# Patient Record
Sex: FEMALE | Race: BLACK OR AFRICAN AMERICAN | NOT HISPANIC OR LATINO | Employment: OTHER | RURAL
[De-identification: names, ages, dates, MRNs, and addresses within clinical notes are randomized per-mention and may not be internally consistent; named-entity substitution may affect disease eponyms.]

---

## 2022-08-24 ENCOUNTER — HOSPITAL ENCOUNTER (INPATIENT)
Facility: HOSPITAL | Age: 67
LOS: 3 days | Discharge: NURSING, REHAB OR LONG-TERM CARE | DRG: 564 | End: 2022-08-27
Attending: EMERGENCY MEDICINE | Admitting: FAMILY MEDICINE
Payer: MEDICARE

## 2022-08-24 DIAGNOSIS — I48.91 ATRIAL FIBRILLATION: ICD-10-CM

## 2022-08-24 DIAGNOSIS — L89.300 PRESSURE INJURY OF BUTTOCK, UNSTAGEABLE, UNSPECIFIED LATERALITY: ICD-10-CM

## 2022-08-24 DIAGNOSIS — I10 HYPERTENSION, UNSPECIFIED TYPE: ICD-10-CM

## 2022-08-24 DIAGNOSIS — I48.91 ATRIAL FIBRILLATION, CONTROLLED: ICD-10-CM

## 2022-08-24 DIAGNOSIS — E11.621 DIABETIC ULCER OF RIGHT HEEL ASSOCIATED WITH TYPE 2 DIABETES MELLITUS, UNSPECIFIED ULCER STAGE: ICD-10-CM

## 2022-08-24 DIAGNOSIS — L97.125 NON-PRESSURE CHRONIC ULCER OF LEFT THIGH WITH MUSCLE INVOLVEMENT WITHOUT EVIDENCE OF NECROSIS: ICD-10-CM

## 2022-08-24 DIAGNOSIS — Z99.2 ESRD ON DIALYSIS: ICD-10-CM

## 2022-08-24 DIAGNOSIS — R53.1 GENERAL WEAKNESS: ICD-10-CM

## 2022-08-24 DIAGNOSIS — I82.4Y9 DEEP VEIN THROMBOSIS (DVT) OF PROXIMAL LOWER EXTREMITY, UNSPECIFIED CHRONICITY, UNSPECIFIED LATERALITY: ICD-10-CM

## 2022-08-24 DIAGNOSIS — T87.40 INFECTION OF AMPUTATION STUMP: ICD-10-CM

## 2022-08-24 DIAGNOSIS — L97.419 DIABETIC ULCER OF RIGHT HEEL ASSOCIATED WITH TYPE 2 DIABETES MELLITUS, UNSPECIFIED ULCER STAGE: ICD-10-CM

## 2022-08-24 DIAGNOSIS — E11.8 DIABETIC FOOT: ICD-10-CM

## 2022-08-24 DIAGNOSIS — A41.9 SEPSIS, DUE TO UNSPECIFIED ORGANISM, UNSPECIFIED WHETHER ACUTE ORGAN DYSFUNCTION PRESENT: Primary | ICD-10-CM

## 2022-08-24 DIAGNOSIS — E11.59 TYPE 2 DIABETES MELLITUS WITH OTHER CIRCULATORY COMPLICATION, UNSPECIFIED WHETHER LONG TERM INSULIN USE: ICD-10-CM

## 2022-08-24 DIAGNOSIS — N18.6 ESRD ON DIALYSIS: ICD-10-CM

## 2022-08-24 DIAGNOSIS — M79.606 LEG PAIN: ICD-10-CM

## 2022-08-24 LAB
ALBUMIN SERPL BCP-MCNC: 2.4 G/DL (ref 3.5–5)
ALBUMIN/GLOB SERPL: 0.7 {RATIO}
ALP SERPL-CCNC: 70 U/L (ref 55–142)
ALT SERPL W P-5'-P-CCNC: 11 U/L (ref 13–56)
ANION GAP SERPL CALCULATED.3IONS-SCNC: 17 MMOL/L (ref 7–16)
AST SERPL W P-5'-P-CCNC: 9 U/L (ref 15–37)
BASOPHILS # BLD AUTO: 0.04 K/UL (ref 0–0.2)
BASOPHILS NFR BLD AUTO: 0.3 % (ref 0–1)
BILIRUB SERPL-MCNC: 0.8 MG/DL (ref 0–1.2)
BUN SERPL-MCNC: 28 MG/DL (ref 7–18)
BUN/CREAT SERPL: 4 (ref 6–20)
CALCIUM SERPL-MCNC: 9.1 MG/DL (ref 8.5–10.1)
CHLORIDE SERPL-SCNC: 95 MMOL/L (ref 98–107)
CO2 SERPL-SCNC: 25 MMOL/L (ref 21–32)
CREAT SERPL-MCNC: 7.46 MG/DL (ref 0.55–1.02)
DIFFERENTIAL METHOD BLD: ABNORMAL
EGFR (NO RACE VARIABLE) (RUSH/TITUS): 6 ML/MIN/1.73M²
EOSINOPHIL # BLD AUTO: 0.03 K/UL (ref 0–0.5)
EOSINOPHIL NFR BLD AUTO: 0.2 % (ref 1–4)
ERYTHROCYTE [DISTWIDTH] IN BLOOD BY AUTOMATED COUNT: 18.3 % (ref 11.5–14.5)
GLOBULIN SER-MCNC: 3.6 G/DL (ref 2–4)
GLUCOSE SERPL-MCNC: 165 MG/DL (ref 74–106)
HCT VFR BLD AUTO: 28.4 % (ref 38–47)
HGB BLD-MCNC: 9.3 G/DL (ref 12–16)
IMM GRANULOCYTES # BLD AUTO: 0.11 K/UL (ref 0–0.04)
IMM GRANULOCYTES NFR BLD: 0.7 % (ref 0–0.4)
INR BLD: 1.86
LACTATE SERPL-SCNC: 1.8 MMOL/L (ref 0.4–2)
LYMPHOCYTES # BLD AUTO: 0.95 K/UL (ref 1–4.8)
LYMPHOCYTES NFR BLD AUTO: 6.3 % (ref 27–41)
MCH RBC QN AUTO: 31.2 PG (ref 27–31)
MCHC RBC AUTO-ENTMCNC: 32.7 G/DL (ref 32–36)
MCV RBC AUTO: 95.3 FL (ref 80–96)
MONOCYTES # BLD AUTO: 0.85 K/UL (ref 0–0.8)
MONOCYTES NFR BLD AUTO: 5.7 % (ref 2–6)
MPC BLD CALC-MCNC: 11.1 FL (ref 9.4–12.4)
NEUTROPHILS # BLD AUTO: 13.04 K/UL (ref 1.8–7.7)
NEUTROPHILS NFR BLD AUTO: 86.8 % (ref 53–65)
NRBC # BLD AUTO: 0 X10E3/UL
NRBC, AUTO (.00): 0 %
PLATELET # BLD AUTO: 212 K/UL (ref 150–400)
POTASSIUM SERPL-SCNC: 3.7 MMOL/L (ref 3.5–5.1)
PROT SERPL-MCNC: 6 G/DL (ref 6.4–8.2)
PROTHROMBIN TIME: 20.6 SECONDS (ref 11.7–14.7)
RBC # BLD AUTO: 2.98 M/UL (ref 4.2–5.4)
SODIUM SERPL-SCNC: 133 MMOL/L (ref 136–145)
WBC # BLD AUTO: 15.02 K/UL (ref 4.5–11)

## 2022-08-24 PROCEDURE — 87149 DNA/RNA DIRECT PROBE: CPT | Performed by: EMERGENCY MEDICINE

## 2022-08-24 PROCEDURE — 83605 ASSAY OF LACTIC ACID: CPT | Performed by: EMERGENCY MEDICINE

## 2022-08-24 PROCEDURE — 11000001 HC ACUTE MED/SURG PRIVATE ROOM

## 2022-08-24 PROCEDURE — 25000003 PHARM REV CODE 250: Performed by: EMERGENCY MEDICINE

## 2022-08-24 PROCEDURE — 99284 PR EMERGENCY DEPT VISIT,LEVEL IV: ICD-10-PCS | Mod: ,,, | Performed by: EMERGENCY MEDICINE

## 2022-08-24 PROCEDURE — 86140 C-REACTIVE PROTEIN: CPT | Performed by: FAMILY MEDICINE

## 2022-08-24 PROCEDURE — 96375 TX/PRO/DX INJ NEW DRUG ADDON: CPT

## 2022-08-24 PROCEDURE — 96367 TX/PROPH/DG ADDL SEQ IV INF: CPT

## 2022-08-24 PROCEDURE — 80053 COMPREHEN METABOLIC PANEL: CPT | Performed by: EMERGENCY MEDICINE

## 2022-08-24 PROCEDURE — 99223 PR INITIAL HOSPITAL CARE,LEVL III: ICD-10-PCS | Mod: ,,, | Performed by: HOSPITALIST

## 2022-08-24 PROCEDURE — 85651 RBC SED RATE NONAUTOMATED: CPT | Performed by: FAMILY MEDICINE

## 2022-08-24 PROCEDURE — 96372 THER/PROPH/DIAG INJ SC/IM: CPT

## 2022-08-24 PROCEDURE — 99223 1ST HOSP IP/OBS HIGH 75: CPT | Mod: ,,, | Performed by: HOSPITALIST

## 2022-08-24 PROCEDURE — 85025 COMPLETE CBC W/AUTO DIFF WBC: CPT | Performed by: EMERGENCY MEDICINE

## 2022-08-24 PROCEDURE — 63600175 PHARM REV CODE 636 W HCPCS: Performed by: EMERGENCY MEDICINE

## 2022-08-24 PROCEDURE — 96365 THER/PROPH/DIAG IV INF INIT: CPT

## 2022-08-24 PROCEDURE — 36415 COLL VENOUS BLD VENIPUNCTURE: CPT | Performed by: EMERGENCY MEDICINE

## 2022-08-24 PROCEDURE — S0073 INJECTION, AZTREONAM, 500 MG: HCPCS | Performed by: EMERGENCY MEDICINE

## 2022-08-24 PROCEDURE — 87040 BLOOD CULTURE FOR BACTERIA: CPT | Performed by: EMERGENCY MEDICINE

## 2022-08-24 PROCEDURE — 99284 EMERGENCY DEPT VISIT MOD MDM: CPT | Mod: ,,, | Performed by: EMERGENCY MEDICINE

## 2022-08-24 PROCEDURE — 85610 PROTHROMBIN TIME: CPT | Performed by: EMERGENCY MEDICINE

## 2022-08-24 PROCEDURE — 99285 EMERGENCY DEPT VISIT HI MDM: CPT | Mod: 25

## 2022-08-24 RX ORDER — TALC
6 POWDER (GRAM) TOPICAL NIGHTLY
COMMUNITY
Start: 2022-08-03

## 2022-08-24 RX ORDER — ONDANSETRON 2 MG/ML
8 INJECTION INTRAMUSCULAR; INTRAVENOUS EVERY 6 HOURS PRN
Status: DISCONTINUED | OUTPATIENT
Start: 2022-08-24 | End: 2022-08-27

## 2022-08-24 RX ORDER — AMLODIPINE BESYLATE 5 MG/1
5 TABLET ORAL DAILY
Status: DISCONTINUED | OUTPATIENT
Start: 2022-08-25 | End: 2022-08-25

## 2022-08-24 RX ORDER — ATORVASTATIN CALCIUM 40 MG/1
40 TABLET, FILM COATED ORAL NIGHTLY
COMMUNITY
Start: 2022-08-03

## 2022-08-24 RX ORDER — LEVOTHYROXINE SODIUM 100 UG/1
100 TABLET ORAL EVERY MORNING
COMMUNITY
Start: 2022-08-03

## 2022-08-24 RX ORDER — PANTOPRAZOLE SODIUM 40 MG/1
40 TABLET, DELAYED RELEASE ORAL DAILY
Status: DISCONTINUED | OUTPATIENT
Start: 2022-08-25 | End: 2022-08-27 | Stop reason: HOSPADM

## 2022-08-24 RX ORDER — ACETAMINOPHEN 500 MG
1000 TABLET ORAL EVERY 6 HOURS PRN
Status: DISCONTINUED | OUTPATIENT
Start: 2022-08-24 | End: 2022-08-27

## 2022-08-24 RX ORDER — PANTOPRAZOLE SODIUM 40 MG/1
40 TABLET, DELAYED RELEASE ORAL DAILY
COMMUNITY
Start: 2022-08-03

## 2022-08-24 RX ORDER — MEGESTROL ACETATE 40 MG/1
40 TABLET ORAL EVERY MORNING
Status: DISCONTINUED | OUTPATIENT
Start: 2022-08-25 | End: 2022-08-27 | Stop reason: HOSPADM

## 2022-08-24 RX ORDER — ESCITALOPRAM OXALATE 10 MG/1
10 TABLET ORAL NIGHTLY
COMMUNITY
Start: 2022-08-03

## 2022-08-24 RX ORDER — SIMETHICONE 80 MG
1 TABLET,CHEWABLE ORAL 3 TIMES DAILY PRN
Status: DISCONTINUED | OUTPATIENT
Start: 2022-08-24 | End: 2022-08-27 | Stop reason: HOSPADM

## 2022-08-24 RX ORDER — GABAPENTIN 100 MG/1
200 CAPSULE ORAL NIGHTLY
COMMUNITY
Start: 2022-08-03

## 2022-08-24 RX ORDER — GUAIFENESIN/DEXTROMETHORPHAN 100-10MG/5
10 SYRUP ORAL EVERY 6 HOURS PRN
Status: DISCONTINUED | OUTPATIENT
Start: 2022-08-24 | End: 2022-08-27 | Stop reason: HOSPADM

## 2022-08-24 RX ORDER — LEVOTHYROXINE SODIUM 100 UG/1
100 TABLET ORAL EVERY MORNING
Status: DISCONTINUED | OUTPATIENT
Start: 2022-08-25 | End: 2022-08-27 | Stop reason: HOSPADM

## 2022-08-24 RX ORDER — ALLOPURINOL 100 MG/1
100 TABLET ORAL DAILY
COMMUNITY
Start: 2022-08-03

## 2022-08-24 RX ORDER — ESCITALOPRAM OXALATE 10 MG/1
10 TABLET ORAL NIGHTLY
Status: DISCONTINUED | OUTPATIENT
Start: 2022-08-25 | End: 2022-08-27 | Stop reason: HOSPADM

## 2022-08-24 RX ORDER — HYDROCODONE BITARTRATE AND ACETAMINOPHEN 7.5; 325 MG/1; MG/1
1 TABLET ORAL EVERY 6 HOURS PRN
Status: DISCONTINUED | OUTPATIENT
Start: 2022-08-25 | End: 2022-08-26

## 2022-08-24 RX ORDER — HYDROCODONE BITARTRATE AND ACETAMINOPHEN 7.5; 325 MG/1; MG/1
1 TABLET ORAL EVERY 6 HOURS PRN
COMMUNITY
Start: 2022-08-15

## 2022-08-24 RX ORDER — ONDANSETRON 2 MG/ML
4 INJECTION INTRAMUSCULAR; INTRAVENOUS ONCE
Status: COMPLETED | OUTPATIENT
Start: 2022-08-24 | End: 2022-08-24

## 2022-08-24 RX ORDER — TRAZODONE HYDROCHLORIDE 50 MG/1
50 TABLET ORAL NIGHTLY PRN
Status: DISCONTINUED | OUTPATIENT
Start: 2022-08-24 | End: 2022-08-27 | Stop reason: HOSPADM

## 2022-08-24 RX ORDER — GABAPENTIN 100 MG/1
200 CAPSULE ORAL NIGHTLY
Status: DISCONTINUED | OUTPATIENT
Start: 2022-08-25 | End: 2022-08-27 | Stop reason: HOSPADM

## 2022-08-24 RX ORDER — AMLODIPINE BESYLATE 5 MG/1
1 TABLET ORAL DAILY
Status: ON HOLD | COMMUNITY
End: 2022-09-02 | Stop reason: CLARIF

## 2022-08-24 RX ORDER — MORPHINE SULFATE 2 MG/ML
2 INJECTION, SOLUTION INTRAMUSCULAR; INTRAVENOUS
Status: COMPLETED | OUTPATIENT
Start: 2022-08-24 | End: 2022-08-24

## 2022-08-24 RX ORDER — WARFARIN 4 MG/1
4 TABLET ORAL
Status: ON HOLD | COMMUNITY
Start: 2022-08-02 | End: 2022-08-30 | Stop reason: DRUGHIGH

## 2022-08-24 RX ORDER — WARFARIN 3 MG/1
3 TABLET ORAL DAILY
Status: ON HOLD | COMMUNITY
Start: 2022-08-02 | End: 2022-08-30 | Stop reason: DRUGHIGH

## 2022-08-24 RX ORDER — TALC
3 POWDER (GRAM) TOPICAL NIGHTLY
Status: DISCONTINUED | OUTPATIENT
Start: 2022-08-25 | End: 2022-08-27 | Stop reason: HOSPADM

## 2022-08-24 RX ORDER — MEGESTROL ACETATE 40 MG/1
40 TABLET ORAL EVERY MORNING
Status: ON HOLD | COMMUNITY
Start: 2022-08-03 | End: 2022-09-14 | Stop reason: HOSPADM

## 2022-08-24 RX ORDER — RESVER/WINE/BFL/GRPSD/PC/C/POM 200MG-60MG
1 CAPSULE ORAL DAILY
COMMUNITY
Start: 2022-08-03

## 2022-08-24 RX ORDER — BISACODYL 5 MG
10 TABLET, DELAYED RELEASE (ENTERIC COATED) ORAL DAILY PRN
Status: DISCONTINUED | OUTPATIENT
Start: 2022-08-24 | End: 2022-08-27 | Stop reason: HOSPADM

## 2022-08-24 RX ORDER — ACETAMINOPHEN 500 MG
1 TABLET ORAL DAILY
Status: DISCONTINUED | OUTPATIENT
Start: 2022-08-25 | End: 2022-08-27 | Stop reason: HOSPADM

## 2022-08-24 RX ORDER — ATORVASTATIN CALCIUM 40 MG/1
40 TABLET, FILM COATED ORAL NIGHTLY
Status: DISCONTINUED | OUTPATIENT
Start: 2022-08-25 | End: 2022-08-27 | Stop reason: HOSPADM

## 2022-08-24 RX ADMIN — SODIUM CHLORIDE, POTASSIUM CHLORIDE, SODIUM LACTATE AND CALCIUM CHLORIDE 250 ML: 600; 310; 30; 20 INJECTION, SOLUTION INTRAVENOUS at 10:08

## 2022-08-24 RX ADMIN — VANCOMYCIN HYDROCHLORIDE 1500 MG: 1 INJECTION, POWDER, LYOPHILIZED, FOR SOLUTION INTRAVENOUS at 10:08

## 2022-08-24 RX ADMIN — ONDANSETRON 4 MG: 2 INJECTION INTRAMUSCULAR; INTRAVENOUS at 09:08

## 2022-08-24 RX ADMIN — MORPHINE SULFATE 2 MG: 2 INJECTION, SOLUTION INTRAMUSCULAR; INTRAVENOUS at 09:08

## 2022-08-24 RX ADMIN — AZTREONAM 1000 MG: 1 INJECTION, POWDER, LYOPHILIZED, FOR SOLUTION INTRAMUSCULAR; INTRAVENOUS at 09:08

## 2022-08-25 PROBLEM — I10 HTN (HYPERTENSION): Status: ACTIVE | Noted: 2022-08-25

## 2022-08-25 PROBLEM — Z86.718 HISTORY OF DVT (DEEP VEIN THROMBOSIS): Status: ACTIVE | Noted: 2022-08-25

## 2022-08-25 PROBLEM — E11.621 DIABETIC ULCER OF RIGHT HEEL ASSOCIATED WITH TYPE 2 DIABETES MELLITUS: Status: ACTIVE | Noted: 2022-08-25

## 2022-08-25 PROBLEM — I48.91 ATRIAL FIBRILLATION, CONTROLLED: Status: ACTIVE | Noted: 2022-08-25

## 2022-08-25 PROBLEM — D72.829 LEUKOCYTOSIS: Status: RESOLVED | Noted: 2022-08-25 | Resolved: 2022-08-25

## 2022-08-25 PROBLEM — N18.6 ESRD ON DIALYSIS: Status: ACTIVE | Noted: 2022-08-25

## 2022-08-25 PROBLEM — I82.409 DEEP VEIN THROMBOSIS (DVT) OF LOWER EXTREMITY: Status: ACTIVE | Noted: 2022-08-25

## 2022-08-25 PROBLEM — L97.125: Status: ACTIVE | Noted: 2022-08-25

## 2022-08-25 PROBLEM — D72.829 LEUKOCYTOSIS: Status: ACTIVE | Noted: 2022-08-25

## 2022-08-25 PROBLEM — L97.419 DIABETIC ULCER OF RIGHT HEEL ASSOCIATED WITH TYPE 2 DIABETES MELLITUS: Status: ACTIVE | Noted: 2022-08-25

## 2022-08-25 PROBLEM — T87.40 INFECTION OF AMPUTATION STUMP: Status: ACTIVE | Noted: 2022-08-25

## 2022-08-25 PROBLEM — Z99.2 ESRD ON DIALYSIS: Status: ACTIVE | Noted: 2022-08-25

## 2022-08-25 PROBLEM — E11.9 TYPE 2 DIABETES MELLITUS: Status: ACTIVE | Noted: 2022-08-25

## 2022-08-25 PROBLEM — L89.300 PRESSURE INJURY OF BUTTOCK, UNSTAGEABLE: Status: ACTIVE | Noted: 2022-08-25

## 2022-08-25 LAB
ANION GAP SERPL CALCULATED.3IONS-SCNC: 16 MMOL/L (ref 7–16)
APTT PPP: 43.2 SECONDS (ref 25.2–37.3)
BASOPHILS # BLD AUTO: 0.03 K/UL (ref 0–0.2)
BASOPHILS NFR BLD AUTO: 0.2 % (ref 0–1)
BUN SERPL-MCNC: 30 MG/DL (ref 7–18)
BUN/CREAT SERPL: 4 (ref 6–20)
CALCIUM SERPL-MCNC: 8.4 MG/DL (ref 8.5–10.1)
CHLORIDE SERPL-SCNC: 98 MMOL/L (ref 98–107)
CO2 SERPL-SCNC: 25 MMOL/L (ref 21–32)
CREAT SERPL-MCNC: 7.87 MG/DL (ref 0.55–1.02)
CRP SERPL-MCNC: 18.4 MG/DL (ref 0–0.8)
DIFFERENTIAL METHOD BLD: ABNORMAL
EGFR (NO RACE VARIABLE) (RUSH/TITUS): 5 ML/MIN/1.73M²
EOSINOPHIL # BLD AUTO: 0.03 K/UL (ref 0–0.5)
EOSINOPHIL NFR BLD AUTO: 0.2 % (ref 1–4)
ERYTHROCYTE [DISTWIDTH] IN BLOOD BY AUTOMATED COUNT: 18.6 % (ref 11.5–14.5)
ERYTHROCYTE [SEDIMENTATION RATE] IN BLOOD BY WESTERGREN METHOD: 69 MM/HR (ref 0–30)
EST. AVERAGE GLUCOSE BLD GHB EST-MCNC: 90 MG/DL
GLUCOSE SERPL-MCNC: 140 MG/DL (ref 70–105)
GLUCOSE SERPL-MCNC: 147 MG/DL (ref 70–105)
GLUCOSE SERPL-MCNC: 157 MG/DL (ref 74–106)
GLUCOSE SERPL-MCNC: 166 MG/DL (ref 70–105)
GLUCOSE SERPL-MCNC: 188 MG/DL (ref 70–105)
GLUCOSE SERPL-MCNC: 97 MG/DL (ref 70–105)
HBA1C MFR BLD HPLC: 5.3 % (ref 4.5–6.6)
HCT VFR BLD AUTO: 23.6 % (ref 38–47)
HGB BLD-MCNC: 7.3 G/DL (ref 12–16)
IMM GRANULOCYTES # BLD AUTO: 0.16 K/UL (ref 0–0.04)
IMM GRANULOCYTES NFR BLD: 1.1 % (ref 0–0.4)
LYMPHOCYTES # BLD AUTO: 0.81 K/UL (ref 1–4.8)
LYMPHOCYTES NFR BLD AUTO: 5.5 % (ref 27–41)
MCH RBC QN AUTO: 30.8 PG (ref 27–31)
MCHC RBC AUTO-ENTMCNC: 30.9 G/DL (ref 32–36)
MCV RBC AUTO: 99.6 FL (ref 80–96)
MONOCYTES # BLD AUTO: 0.85 K/UL (ref 0–0.8)
MONOCYTES NFR BLD AUTO: 5.7 % (ref 2–6)
MPC BLD CALC-MCNC: 12 FL (ref 9.4–12.4)
NEUTROPHILS # BLD AUTO: 12.96 K/UL (ref 1.8–7.7)
NEUTROPHILS NFR BLD AUTO: 87.3 % (ref 53–65)
NRBC # BLD AUTO: 0 X10E3/UL
NRBC, AUTO (.00): 0 %
PLATELET # BLD AUTO: 203 K/UL (ref 150–400)
POTASSIUM SERPL-SCNC: 4.5 MMOL/L (ref 3.5–5.1)
RBC # BLD AUTO: 2.37 M/UL (ref 4.2–5.4)
SARS-COV-2 RDRP RESP QL NAA+PROBE: NEGATIVE
SODIUM SERPL-SCNC: 134 MMOL/L (ref 136–145)
VANCOMYCIN SERPL-MCNC: 9.6 ΜG/ML (ref 0–20)
WBC # BLD AUTO: 14.84 K/UL (ref 4.5–11)

## 2022-08-25 PROCEDURE — 63600175 PHARM REV CODE 636 W HCPCS: Performed by: NURSE PRACTITIONER

## 2022-08-25 PROCEDURE — 25000003 PHARM REV CODE 250: Performed by: FAMILY MEDICINE

## 2022-08-25 PROCEDURE — 63600175 PHARM REV CODE 636 W HCPCS: Performed by: FAMILY MEDICINE

## 2022-08-25 PROCEDURE — 25000003 PHARM REV CODE 250: Performed by: INTERNAL MEDICINE

## 2022-08-25 PROCEDURE — 99232 SBSQ HOSP IP/OBS MODERATE 35: CPT | Mod: ,,, | Performed by: FAMILY MEDICINE

## 2022-08-25 PROCEDURE — 20000000 HC ICU ROOM

## 2022-08-25 PROCEDURE — 87070 CULTURE OTHR SPECIMN AEROBIC: CPT | Performed by: FAMILY MEDICINE

## 2022-08-25 PROCEDURE — 85025 COMPLETE CBC W/AUTO DIFF WBC: CPT | Performed by: FAMILY MEDICINE

## 2022-08-25 PROCEDURE — 99232 PR SUBSEQUENT HOSPITAL CARE,LEVL II: ICD-10-PCS | Mod: ,,, | Performed by: FAMILY MEDICINE

## 2022-08-25 PROCEDURE — 85730 THROMBOPLASTIN TIME PARTIAL: CPT | Performed by: FAMILY MEDICINE

## 2022-08-25 PROCEDURE — S0030 INJECTION, METRONIDAZOLE: HCPCS | Performed by: FAMILY MEDICINE

## 2022-08-25 PROCEDURE — 99232 SBSQ HOSP IP/OBS MODERATE 35: CPT | Mod: ,,, | Performed by: NURSE PRACTITIONER

## 2022-08-25 PROCEDURE — 80202 ASSAY OF VANCOMYCIN: CPT | Performed by: FAMILY MEDICINE

## 2022-08-25 PROCEDURE — 99232 PR SUBSEQUENT HOSPITAL CARE,LEVL II: ICD-10-PCS | Mod: ,,, | Performed by: NURSE PRACTITIONER

## 2022-08-25 PROCEDURE — 82962 GLUCOSE BLOOD TEST: CPT

## 2022-08-25 PROCEDURE — 83036 HEMOGLOBIN GLYCOSYLATED A1C: CPT | Performed by: FAMILY MEDICINE

## 2022-08-25 PROCEDURE — 80048 BASIC METABOLIC PNL TOTAL CA: CPT | Performed by: FAMILY MEDICINE

## 2022-08-25 PROCEDURE — S5010 5% DEXTROSE AND 0.45% SALINE: HCPCS | Performed by: FAMILY MEDICINE

## 2022-08-25 PROCEDURE — 63600175 PHARM REV CODE 636 W HCPCS: Performed by: INTERNAL MEDICINE

## 2022-08-25 PROCEDURE — 25000003 PHARM REV CODE 250: Performed by: NURSE PRACTITIONER

## 2022-08-25 PROCEDURE — 94761 N-INVAS EAR/PLS OXIMETRY MLT: CPT

## 2022-08-25 PROCEDURE — 90935 HEMODIALYSIS ONE EVALUATION: CPT

## 2022-08-25 PROCEDURE — 27000221 HC OXYGEN, UP TO 24 HOURS

## 2022-08-25 PROCEDURE — 87635 SARS-COV-2 COVID-19 AMP PRB: CPT | Performed by: FAMILY MEDICINE

## 2022-08-25 PROCEDURE — 87075 CULTR BACTERIA EXCEPT BLOOD: CPT | Performed by: FAMILY MEDICINE

## 2022-08-25 PROCEDURE — 36415 COLL VENOUS BLD VENIPUNCTURE: CPT | Performed by: FAMILY MEDICINE

## 2022-08-25 PROCEDURE — S0073 INJECTION, AZTREONAM, 500 MG: HCPCS | Performed by: FAMILY MEDICINE

## 2022-08-25 RX ORDER — GLUCAGON 1 MG
1 KIT INJECTION
Status: DISCONTINUED | OUTPATIENT
Start: 2022-08-25 | End: 2022-08-27 | Stop reason: HOSPADM

## 2022-08-25 RX ORDER — MUPIROCIN 20 MG/G
OINTMENT TOPICAL 2 TIMES DAILY
Status: DISCONTINUED | OUTPATIENT
Start: 2022-08-25 | End: 2022-08-27 | Stop reason: HOSPADM

## 2022-08-25 RX ORDER — INSULIN ASPART 100 [IU]/ML
0-5 INJECTION, SOLUTION INTRAVENOUS; SUBCUTANEOUS
Status: DISCONTINUED | OUTPATIENT
Start: 2022-08-25 | End: 2022-08-27 | Stop reason: HOSPADM

## 2022-08-25 RX ORDER — SODIUM CHLORIDE 9 MG/ML
INJECTION, SOLUTION INTRAVENOUS
Status: DISCONTINUED | OUTPATIENT
Start: 2022-08-25 | End: 2022-08-27 | Stop reason: HOSPADM

## 2022-08-25 RX ORDER — METRONIDAZOLE 500 MG/100ML
500 INJECTION, SOLUTION INTRAVENOUS
Status: DISCONTINUED | OUTPATIENT
Start: 2022-08-25 | End: 2022-08-26

## 2022-08-25 RX ORDER — MORPHINE SULFATE 2 MG/ML
2 INJECTION, SOLUTION INTRAMUSCULAR; INTRAVENOUS ONCE
Status: COMPLETED | OUTPATIENT
Start: 2022-08-25 | End: 2022-08-25

## 2022-08-25 RX ORDER — FOLIC ACID 1 MG/1
1000 TABLET ORAL EVERY MORNING
COMMUNITY
Start: 2022-08-03

## 2022-08-25 RX ORDER — SODIUM CHLORIDE 0.9 % (FLUSH) 0.9 %
10 SYRINGE (ML) INJECTION EVERY 12 HOURS PRN
Status: DISCONTINUED | OUTPATIENT
Start: 2022-08-25 | End: 2022-08-27 | Stop reason: HOSPADM

## 2022-08-25 RX ORDER — LINEZOLID 2 MG/ML
600 INJECTION, SOLUTION INTRAVENOUS
Status: DISCONTINUED | OUTPATIENT
Start: 2022-08-25 | End: 2022-08-25

## 2022-08-25 RX ORDER — WARFARIN SODIUM 5 MG/1
5 TABLET ORAL DAILY
Status: ON HOLD | COMMUNITY
Start: 2022-08-23 | End: 2022-09-14 | Stop reason: HOSPADM

## 2022-08-25 RX ORDER — INSULIN ASPART 100 [IU]/ML
3 INJECTION, SOLUTION INTRAVENOUS; SUBCUTANEOUS
Status: DISCONTINUED | OUTPATIENT
Start: 2022-08-25 | End: 2022-08-27 | Stop reason: HOSPADM

## 2022-08-25 RX ORDER — ASPIRIN 81 MG/1
81 TABLET ORAL DAILY
Status: DISCONTINUED | OUTPATIENT
Start: 2022-08-25 | End: 2022-08-27 | Stop reason: HOSPADM

## 2022-08-25 RX ORDER — ENOXAPARIN SODIUM 150 MG/ML
120 INJECTION SUBCUTANEOUS DAILY
Status: ON HOLD | COMMUNITY
Start: 2022-08-23 | End: 2022-08-30 | Stop reason: ALTCHOICE

## 2022-08-25 RX ORDER — ALLOPURINOL 100 MG/1
100 TABLET ORAL DAILY
Status: DISCONTINUED | OUTPATIENT
Start: 2022-08-25 | End: 2022-08-27 | Stop reason: HOSPADM

## 2022-08-25 RX ORDER — MORPHINE SULFATE 2 MG/ML
2 INJECTION, SOLUTION INTRAMUSCULAR; INTRAVENOUS ONCE
Status: DISCONTINUED | OUTPATIENT
Start: 2022-08-25 | End: 2022-08-25

## 2022-08-25 RX ORDER — HEPARIN SODIUM 5000 [USP'U]/ML
5000 INJECTION, SOLUTION INTRAVENOUS; SUBCUTANEOUS EVERY 8 HOURS
Status: DISCONTINUED | OUTPATIENT
Start: 2022-08-25 | End: 2022-08-25

## 2022-08-25 RX ORDER — HUMAN INSULIN 100 [USP'U]/ML
25 INJECTION, SUSPENSION SUBCUTANEOUS 2 TIMES DAILY
Status: ON HOLD | COMMUNITY
Start: 2022-08-04 | End: 2022-09-14 | Stop reason: HOSPADM

## 2022-08-25 RX ORDER — ASPIRIN 81 MG/1
81 TABLET ORAL DAILY
COMMUNITY

## 2022-08-25 RX ORDER — FOLIC ACID 1 MG/1
1000 TABLET ORAL EVERY MORNING
Status: DISCONTINUED | OUTPATIENT
Start: 2022-08-25 | End: 2022-08-27 | Stop reason: HOSPADM

## 2022-08-25 RX ORDER — HEPARIN SODIUM 1000 [USP'U]/ML
4000 INJECTION, SOLUTION INTRAVENOUS; SUBCUTANEOUS
Status: DISCONTINUED | OUTPATIENT
Start: 2022-08-25 | End: 2022-08-27 | Stop reason: HOSPADM

## 2022-08-25 RX ORDER — DEXTROSE MONOHYDRATE AND SODIUM CHLORIDE 5; .45 G/100ML; G/100ML
INJECTION, SOLUTION INTRAVENOUS CONTINUOUS
Status: DISCONTINUED | OUTPATIENT
Start: 2022-08-25 | End: 2022-08-25

## 2022-08-25 RX ADMIN — ATORVASTATIN CALCIUM 40 MG: 40 TABLET, FILM COATED ORAL at 01:08

## 2022-08-25 RX ADMIN — ALLOPURINOL 100 MG: 100 TABLET ORAL at 09:08

## 2022-08-25 RX ADMIN — HYDROCODONE BITARTRATE AND ACETAMINOPHEN 1 TABLET: 7.5; 325 TABLET ORAL at 10:08

## 2022-08-25 RX ADMIN — METRONIDAZOLE 500 MG: 500 INJECTION, SOLUTION INTRAVENOUS at 04:08

## 2022-08-25 RX ADMIN — FOLIC ACID 1000 MCG: 1 TABLET ORAL at 06:08

## 2022-08-25 RX ADMIN — GABAPENTIN 200 MG: 100 CAPSULE ORAL at 09:08

## 2022-08-25 RX ADMIN — HEPARIN SODIUM 4000 UNITS: 1000 INJECTION, SOLUTION INTRAVENOUS; SUBCUTANEOUS at 01:08

## 2022-08-25 RX ADMIN — ESCITALOPRAM OXALATE 10 MG: 10 TABLET ORAL at 01:08

## 2022-08-25 RX ADMIN — Medication 5000 UNITS: at 08:08

## 2022-08-25 RX ADMIN — MUPIROCIN: 20 OINTMENT TOPICAL at 09:08

## 2022-08-25 RX ADMIN — HYDROCODONE BITARTRATE AND ACETAMINOPHEN 1 TABLET: 7.5; 325 TABLET ORAL at 02:08

## 2022-08-25 RX ADMIN — SODIUM CHLORIDE: 9 INJECTION, SOLUTION INTRAVENOUS at 11:08

## 2022-08-25 RX ADMIN — MORPHINE SULFATE 2 MG: 2 INJECTION, SOLUTION INTRAMUSCULAR; INTRAVENOUS at 05:08

## 2022-08-25 RX ADMIN — ESCITALOPRAM OXALATE 10 MG: 10 TABLET ORAL at 09:08

## 2022-08-25 RX ADMIN — AZTREONAM 1000 MG: 1 INJECTION, POWDER, LYOPHILIZED, FOR SOLUTION INTRAMUSCULAR; INTRAVENOUS at 09:08

## 2022-08-25 RX ADMIN — DEXTROSE AND SODIUM CHLORIDE: 5; 450 INJECTION, SOLUTION INTRAVENOUS at 05:08

## 2022-08-25 RX ADMIN — INSULIN DETEMIR 10 UNITS: 100 INJECTION, SOLUTION SUBCUTANEOUS at 02:08

## 2022-08-25 RX ADMIN — ATORVASTATIN CALCIUM 40 MG: 40 TABLET, FILM COATED ORAL at 09:08

## 2022-08-25 RX ADMIN — MELATONIN 3 MG: at 09:08

## 2022-08-25 RX ADMIN — PANTOPRAZOLE SODIUM 40 MG: 40 TABLET, DELAYED RELEASE ORAL at 08:08

## 2022-08-25 RX ADMIN — INSULIN DETEMIR 10 UNITS: 100 INJECTION, SOLUTION SUBCUTANEOUS at 09:08

## 2022-08-25 RX ADMIN — HYDROCODONE BITARTRATE AND ACETAMINOPHEN 1 TABLET: 7.5; 325 TABLET ORAL at 01:08

## 2022-08-25 RX ADMIN — ASPIRIN 81 MG: 81 TABLET, COATED ORAL at 08:08

## 2022-08-25 RX ADMIN — MELATONIN 3 MG: at 01:08

## 2022-08-25 RX ADMIN — MEGESTROL ACETATE 40 MG: 40 TABLET ORAL at 06:08

## 2022-08-25 RX ADMIN — LEVOTHYROXINE SODIUM 100 MCG: 100 TABLET ORAL at 06:08

## 2022-08-25 RX ADMIN — NOREPINEPHRINE BITARTRATE 0.02 MCG/KG/MIN: 1 INJECTION, SOLUTION, CONCENTRATE INTRAVENOUS at 06:08

## 2022-08-25 RX ADMIN — GABAPENTIN 200 MG: 100 CAPSULE ORAL at 01:08

## 2022-08-25 RX ADMIN — METRONIDAZOLE 500 MG: 500 INJECTION, SOLUTION INTRAVENOUS at 09:08

## 2022-08-25 RX ADMIN — HYDROCODONE BITARTRATE AND ACETAMINOPHEN 1 TABLET: 7.5; 325 TABLET ORAL at 09:08

## 2022-08-25 NOTE — PROGRESS NOTES
1620pm... Here at bedside. Patient noted to be hypotensive 76/40 post dialysis. Unable to push IV bolus in this dialysis patient. Spoke with Eli Valdes NP in ICU. She accepts patient for transfer and closer monitoring and intervention.

## 2022-08-25 NOTE — ASSESSMENT & PLAN NOTE
Sister states patient has been taking coumadin for long time 3mg to 4mg then when she went to [facility] about 2 weeks ago, pt was diagnosed with DVT and coumadin dosage was increased to 5mg. Sister states pt started taking coumadin 5 mg 2 days ago(8/23)  after she picked up the medicine. INR is 1.86.  she was also prescribed lovenox 120mg daily for DVT  and given lovenox injection daily (8/23, 8/24). Will hold on anticoagulants at this time for possible procedure     -Rate controlled  -tele

## 2022-08-25 NOTE — ASSESSMENT & PLAN NOTE
-HgA1C pending   -ESR, CRP pending   -Wound care consult.   -Blood cultures x 2 pending   -US arterial Right LE: poorly visualized due to body habitus  -US venous Right LE: No DVT   -SS consult for possible NH placement

## 2022-08-25 NOTE — ASSESSMENT & PLAN NOTE
No abscess or drainage present.     -wound care consult.   -ESR, CRP pending  -Blood cultures x 2 pending.

## 2022-08-25 NOTE — ASSESSMENT & PLAN NOTE
Pt takes 70/30 novolin 25 units QAM and 25 units QHS.with sliding scale at home.     HgA1C pending  -will initiate aspart 3 TIDWM with 10 units basal insulin for now   -SSI   -POCT glucose x 4 daily

## 2022-08-25 NOTE — HOSPITAL COURSE
08/25 Patient seen in ER. Lab unable to obtain specimen. Poor access. Will order midline. Nephrology consult for dialysis regimen (TTS). Wound consult.    acute perforated diverticulitis -for repeat ct abdomen on wednesday  fevers - improving  leukocytosis - resolved  plan - increase vanco to 1.5gms iv q12  cont azactam 1 gm iv q8  cont flagyl 500mgs iv q8

## 2022-08-25 NOTE — ASSESSMENT & PLAN NOTE
Clean with vashe  Apply vashe moistened drawtex to wound bed  Cover with secure wit 4x4s, abd pad, and paper tape  Change daily and PRN for drainage  Cultures pending  IV antibiotics

## 2022-08-25 NOTE — NURSING
Received patient from dialysis approximately 1500. Patient on 2L NC, c/o sever pain in right leg. Took over an hour to register a O2 sat trying, ear, finger, forehead. Pulses not palpable. Patient awake and alert talking coherently. Blood pressure very low, unable to hear when taking manually. Taken multiple times manually and automatically in various extremities with the most accurate being 76/40 manually. Dr. Espinosa called twice but no answer. Secure chatted about patient status. Will continue to monitor

## 2022-08-25 NOTE — ASSESSMENT & PLAN NOTE
HgA1C pending  -Pt not on any diabetic meds. Will need to confirm with family and pharmacy AM   -will initiate aspart 3 TIDWM with basal insulin for now   -SSI   -POCT glucose x 4 daily

## 2022-08-25 NOTE — ED PROVIDER NOTES
Encounter Date: 8/24/2022       History     Chief Complaint   Patient presents with    Foot Pain       Pt is a 66 y.o. female presenting with RLE pain. She is accompanied by her daughter at the time of my exam. Pt reports that she was seen at [facility] last week and found to have a DVT. She was started on lovenox with a bridge to coumadin but neither family nor Pt filled the coumadin rx. Pt denies fever, chills or any other sx. Pt reports that she was evicted from her apartment today.     Pt has a hx of ESRD on TTS Tu, Th, Sat, DM2, HTN and  L AKA          Review of patient's allergies indicates:   Allergen Reactions    Milk containing products     Pcn [penicillins]      Past Medical History:   Diagnosis Date    Gout, unspecified     Heart attack     HTN (hypertension)     Kidney failure     Neuropathy      Past Surgical History:   Procedure Laterality Date    CHOLECYSTECTOMY      HYSTERECTOMY       History reviewed. No pertinent family history.  Social History     Tobacco Use    Smoking status: Never Smoker    Smokeless tobacco: Never Used   Substance Use Topics    Alcohol use: Never    Drug use: Never     Review of Systems   Constitutional: Negative for fever.   HENT: Negative for sore throat.    Respiratory: Negative for shortness of breath.    Cardiovascular: Negative for chest pain.   Gastrointestinal: Negative for nausea.   Genitourinary: Negative for dysuria.   Musculoskeletal: Positive for myalgias. Negative for back pain.   Skin: Negative for rash.   Neurological: Negative for weakness.   Hematological: Does not bruise/bleed easily.       Physical Exam     Initial Vitals [08/24/22 1730]   BP Pulse Resp Temp SpO2   (!) 165/70 95 (!) 22 99.4 °F (37.4 °C) 98 %      MAP       --         Physical Exam    Constitutional: She appears well-developed and well-nourished. She is not diaphoretic. No distress.   HENT:   Head: Normocephalic and atraumatic.   Right Ear: External ear normal.   Left Ear: External  ear normal.   Mouth/Throat: Oropharynx is clear and moist. No oropharyngeal exudate.   Eyes: EOM are normal. Pupils are equal, round, and reactive to light. Right eye exhibits no discharge. Left eye exhibits no discharge. No scleral icterus.   Neck:   Normal range of motion.  Cardiovascular: Normal rate, regular rhythm, normal heart sounds and intact distal pulses. Exam reveals no gallop and no friction rub.    No murmur heard.  Pulmonary/Chest: Breath sounds normal. No respiratory distress. She has no wheezes. She has no rhonchi. She has no rales. She exhibits no tenderness.   Abdominal: Abdomen is soft. She exhibits no distension. There is no abdominal tenderness. There is no rebound and no guarding.   Musculoskeletal:         General: No tenderness or edema. Normal range of motion.      Cervical back: Normal range of motion.      Comments: L AKA with wound vac in place.      Neurological: She is alert and oriented to person, place, and time. GCS score is 15. GCS eye subscore is 4. GCS verbal subscore is 5. GCS motor subscore is 6.   Skin: Skin is warm and dry. Lesion noted.   Approx. 4 cm eschar noted to the R heel.                            Medical Screening Exam   See Full Note    ED Course   Procedures  Labs Reviewed   COMPREHENSIVE METABOLIC PANEL - Abnormal; Notable for the following components:       Result Value    Sodium 133 (*)     Chloride 95 (*)     Anion Gap 17 (*)     Glucose 165 (*)     BUN 28 (*)     Creatinine 7.46 (*)     BUN/Creatinine Ratio 4 (*)     Total Protein 6.0 (*)     Albumin 2.4 (*)     ALT 11 (*)     AST 9 (*)     eGFR 6 (*)     All other components within normal limits   PROTIME-INR - Abnormal; Notable for the following components:    PT 20.6 (*)     All other components within normal limits   CBC WITH DIFFERENTIAL - Abnormal; Notable for the following components:    WBC 15.02 (*)     RBC 2.98 (*)     Hemoglobin 9.3 (*)     Hematocrit 28.4 (*)     MCH 31.2 (*)     RDW 18.3 (*)      Neutrophils % 86.8 (*)     Lymphocytes % 6.3 (*)     Eosinophils % 0.2 (*)     Immature Granulocytes % 0.7 (*)     Neutrophils, Abs 13.04 (*)     Lymphocytes, Absolute 0.95 (*)     Monocytes, Absolute 0.85 (*)     Immature Granulocytes, Absolute 0.11 (*)     All other components within normal limits   LACTIC ACID, PLASMA - Normal   CULTURE, BLOOD   CULTURE, BLOOD   CBC W/ AUTO DIFFERENTIAL    Narrative:     The following orders were created for panel order CBC auto differential.  Procedure                               Abnormality         Status                     ---------                               -----------         ------                     CBC with Differential[[id number]]        Abnormal            Final result                 Please view results for these tests on the individual orders.   EXTRA TUBES    Narrative:     The following orders were created for panel order EXTRA TUBES.  Procedure                               Abnormality         Status                     ---------                               -----------         ------                     Light Green Top Hold[[id number]]                             In process                   Please view results for these tests on the individual orders.   LIGHT GREEN TOP HOLD   SEDIMENTATION RATE, AUTOMATED   C-REACTIVE PROTEIN          Imaging Results          X-Ray Chest 1 View (In process)                US ARTERIAL DOPPLER EXAM (In process)                US Lower Extremity Veins Right (In process)                  Medications   vancomycin (VANCOCIN) 1,500 mg in dextrose 5 % 250 mL IVPB (1,500 mg Intravenous New Bag 8/24/22 2227)   acetaminophen tablet 1,000 mg (has no administration in time range)   dextromethorphan-guaiFENesin  mg/5 ml liquid 10 mL (has no administration in time range)   bisacodyL EC tablet 10 mg (has no administration in time range)   ondansetron injection 8 mg (has no administration in time range)   simethicone chewable tablet  80 mg (has no administration in time range)   traZODone tablet 50 mg (has no administration in time range)   amLODIPine tablet 5 mg (has no administration in time range)   atorvastatin tablet 40 mg (has no administration in time range)   EScitalopram oxalate tablet 10 mg (has no administration in time range)   gabapentin capsule 200 mg (has no administration in time range)   HYDROcodone-acetaminophen 7.5-325 mg per tablet 1 tablet (has no administration in time range)   levothyroxine tablet 100 mcg (has no administration in time range)   megestroL tablet 40 mg (has no administration in time range)   melatonin tablet 3 mg (has no administration in time range)   pantoprazole EC tablet 40 mg (has no administration in time range)   cholecalciferol (vitamin D3) 125 mcg (5,000 unit) tablet 5,000 Units (has no administration in time range)   morphine injection 2 mg (2 mg Intravenous Given 8/24/22 2151)   ondansetron injection 4 mg (4 mg Intravenous Given 8/24/22 2151)   aztreonam (AZACTAM) 1,000 mg in dextrose 5 % in water (D5W) 5 % 50 mL IVPB (MB+) (0 mg Intravenous Stopped 8/24/22 2223)   lactated ringers bolus 250 mL (0 mLs Intravenous Stopped 8/24/22 2336)                Attending Attestation:   Physician Attestation Statement for Resident:  As the supervising MD   Physician Attestation Statement: I have personally seen and examined this patient.   I agree with the above history. -:   As the supervising MD I agree with the above PE.    As the supervising MD I agree with the above treatment, course, plan, and disposition.                ED Course as of 08/24/22 9468   Wed Aug 24, 2022   1930 Patient was evaluated and managed by attending physician and the resident physician.  Patient and her family complain of patient talking out of her head failure to thrive at home with decreased oral intake and not able to manage herself.  Family states that she talk side of her head and is very confused at home.  Patient had a left  above the knee amputation at [facility] a few months ago.  Patient developed a wound infection in May and has had to have a wound VAC placed on the left lower extremity stump.  This area has been getting better.  However patient has developed a sore on the right heel with ulceration this been causing her lot of pain not well controlled with Neurontin.  Also she has been diagnosed with a DVT of the right lower extremity and has not been compliant with Coumadin.  Physical exam shows patient is awake and alert.  She is morbidly obese.  Heart lung sounds are unremarkable.  Left lower extremity shows above-the-knee amputation with wound VAC in place and no surrounding erythema or induration.  Right lower extremity shows a proximally 4 cm eschar to the heel.  Patient has areas on the sacrum that are ulcerated.  Please refer to the clinical media. [PK]   2112 Uncertain at this point the patient has infection.  Perhaps she has infection in her right foot with eschar on the heel but surrounding skin is not erythematous.  White count is elevated.  Urinalysis pending.  Patient did have slight they elevated respiratory rate of 20 to in that also showed the white blood cell count is elevated so that is to serous criteria.  Will treat empirically with vancomycin and aztreonam considering she has penicillin allergy. [PK]   2122 Ultrasound negative for DVT.  Uncertain a prior diagnosis of DVT. [PK]   2142 Chest x-ray shows no acute abnormality.  Temporary dialysis catheter appears normal on chest x-ray. [PK]   2147 Patient is hemodynamically stable.  Have added lactic acid.  Will bolus 250 mL LR but more fluids at this time may pose more harm consider patient has end-stage renal disease.  However will follow up on lactic acid and reassess. [PK]   2339 Lactate, Amauri: 1.8 [PK]      ED Course User Index  [PK] Scooter Varner MD          Clinical Impression:   Final diagnoses:  [M79.606] Leg pain  [R53.1] General weakness  [A41.9]  Sepsis, due to unspecified organism, unspecified whether acute organ dysfunction present (Primary)  [E11.8] Diabetic foot          ED Disposition Condition    Admit               Matt Pollard DO  Resident  08/24/22 9545       Scooter Varner MD  08/24/22 8223

## 2022-08-25 NOTE — HPI
A 67 yo female presents from [facility] with cc of RLE pain. Pt has Home health and told HH nurse that she was having RLE pain and brought to ED by her daughter. Pt had LLE above knee amputation in May, 2022 secondary to diabetic wound at [facility]. Wound VAC placed on LLE stump in May as she developed wound infection. Pt states this time she developed a sore on the right heel with ulceration which is causing her a lot of pain. Pt states she has pain on her lower back and buttocks as well. Pt also states she has been diagnosed with DVT of her RLE at [facility] on 8/18 and started taking lovenox 120mg on 8/23 and 8/24. Also coumadin dosage for A fib was increased to 5mg.  Pt denies fever, chills, sob, chest pain or abdominal pain. Pt was very tearful during exam and I personally spoke with her daughter, Mon at bedside and she states pt has been emotional, not eating well and not taking good care of herself at home over the last few weeks. Pt lives with her granddaughter at home.     ED workup include VS: 165/70, HR 95, RR 22, 99.4F, 98spO2. Pt has leukocytosis with WBC 15, BUN/CR 28/7.46 with ESRD HD on tues/thurs/sat. lactic acid WNL. Blood cx x 2 obtained prior to starting Abx. Pt was given van and aztreonam considering she has  penicillin allergy. Pt will be admitted for further work up and management.

## 2022-08-25 NOTE — H&P
[facility] - Emergency Department  Hospital Medicine  History & Physical    Patient Name: Michelle Nunes  MRN: [id number]  Patient Class: IP- Inpatient  Admission Date: 8/24/2022  Attending Physician: Jose Luis Espinosa MD   Primary Care Provider: Primary Doctor No         Patient information was obtained from patient, relative(s) and ER records.     Subjective:     Principal Problem:Diabetic ulcer of right heel associated with type 2 diabetes mellitus    Chief Complaint:   Chief Complaint   Patient presents with    Foot Pain        HPI: A 65 yo female presents from [facility] with cc of RLE pain. Pt has Home health and told HH nurse that she was having RLE pain and rocky to ED by her daughter. Pt had LLE above knee amputation on May, 2022 secondary to diabetic wound at [facility]. Wound VAC placed on LLE stump in May as she developed wound infection. Pt states this time she developed a sore on the right heel with ulceration which is causing her a lot of pain. Pt states she has pain on her lower back and buttocks as well. She states she developed pressure sore on her buttocks a few months ago and recently has been bothering her with a lot of pain Pt also states she has been diagnosed with DVT of her RLE at [facility] last week and takes coumadin. Pt denies fever, chills, sob, chest pain or abdominal pain. Pt was very tearful during exam and I personally spoke with her daughter, Mon over the phone and she states pt has been emotional, not eating well and taking good care of herself at home over the last few weeks.     ED workup include VS: 165/70, HR 95, RR 22, 99.4F, 98spO2. Pt has leukocytosis with WBC 15, BUN/CR 28/7.46 with ESRD HD on tues/thurs/sat. lactic acid WNL. Blood cx x 2 obtained prior to starting Abx. Pt was given van and aztreonam considering she has  penicillin allergy. Pt will be admitted for further work up and management.       Past Medical History:   Diagnosis Date    Diabetes  mellitus     ESRD (end stage renal disease) on dialysis     Gout, unspecified     Heart attack     HTN (hypertension)     Kidney failure     Neuropathy        Past Surgical History:   Procedure Laterality Date    CHOLECYSTECTOMY      HYSTERECTOMY      LEG AMPUTATION THROUGH KNEE Left 07/05/2022       Review of patient's allergies indicates:   Allergen Reactions    Milk containing products     Pcn [penicillins]        No current facility-administered medications on file prior to encounter.     Current Outpatient Medications on File Prior to Encounter   Medication Sig    warfarin (COUMADIN) 3 MG tablet 3 mg by Per G Tube route Daily. Take daily Tues through Sunday    warfarin (COUMADIN) 4 MG tablet Take 4 mg by mouth every Monday.    allopurinoL (ZYLOPRIM) 100 MG tablet Take 100 mg by mouth once daily.    amLODIPine (NORVASC) 5 MG tablet Take 1 tablet by mouth once daily.    atorvastatin (LIPITOR) 40 MG tablet Take 40 mg by mouth every evening.    EScitalopram oxalate (LEXAPRO) 10 MG tablet Take 10 mg by mouth every evening.    gabapentin (NEURONTIN) 100 MG capsule Take 200 mg by mouth every evening.    HYDROcodone-acetaminophen (NORCO) 7.5-325 mg per tablet Take 1 tablet by mouth every 6 (six) hours as needed.    levothyroxine (SYNTHROID) 100 MCG tablet Take 100 mcg by mouth every morning.    megestroL (MEGACE) 40 MG Tab Take 40 mg by mouth every morning.    melatonin (MELATIN) 3 mg tablet Take 3 mg by mouth every evening. Take 2 tablets by mouth at bedtime    pantoprazole (PROTONIX) 40 MG tablet Take 40 mg by mouth once daily.    VITAMIN D3 125 mcg (5,000 unit) Tab Take 1 tablet by mouth once daily.     Family History       Problem Relation (Age of Onset)    Cancer Brother    Cirrhosis Mother    Colon cancer Brother    Diabetes Father    Hypertension Father          Tobacco Use    Smoking status: Never Smoker    Smokeless tobacco: Never Used   Substance and Sexual Activity    Alcohol use:  Never    Drug use: Never    Sexual activity: Not on file     Review of Systems   Constitutional:  Negative for chills and fever.   Respiratory:  Negative for cough, chest tightness, shortness of breath and wheezing.    Cardiovascular:  Negative for chest pain.   Gastrointestinal:  Negative for abdominal pain, nausea and vomiting.   Musculoskeletal:  Positive for back pain.        Pain in RLE and foot, pain in buttocks    Skin:  Positive for wound.   Objective:     Vital Signs (Most Recent):  Temp: 99.4 °F (37.4 °C) (08/24/22 1730)  Pulse: 95 (08/24/22 1730)  Resp: 18 (08/24/22 2151)  BP: (!) 165/70 (08/24/22 1730)  SpO2: 98 % (08/24/22 1730)   Vital Signs (24h Range):  Temp:  [99.4 °F (37.4 °C)] 99.4 °F (37.4 °C)  Pulse:  [95] 95  Resp:  [18-22] 18  SpO2:  [98 %] 98 %  BP: (165)/(70) 165/70     Weight: 136.1 kg (300 lb)  Body mass index is 54.87 kg/m².    Physical Exam  Constitutional:       Appearance: She is obese. She is not toxic-appearing or diaphoretic.   HENT:      Head: Normocephalic and atraumatic.      Mouth/Throat:      Pharynx: Oropharynx is clear.   Eyes:      Comments: Cloudy corneal edema in right eye complicated by cataract surgery. Right eye blindness   Cardiovascular:      Rate and Rhythm: Normal rate and regular rhythm.      Pulses:           Dorsalis pedis pulses are 0 on the right side.      Heart sounds: Normal heart sounds.   Pulmonary:      Breath sounds: Normal breath sounds. No wheezing, rhonchi or rales.   Abdominal:      General: Bowel sounds are normal.      Palpations: Abdomen is soft.   Musculoskeletal:      Right lower leg: No edema.      Left lower leg: No edema.      Comments: Left AKA with wound vac in place       Left Lower Extremity: Left leg is amputated above knee.   Feet:      Right foot:      Skin integrity: Ulcer present.   Skin:     Comments: Approx 4 cm round eschar noted to the right heel. Sacral ulcer noted on left buttock.    Neurological:      General: No focal  deficit present.      Mental Status: She is alert.   Psychiatric:      Comments: Tearful            Significant Labs: All pertinent labs within the past 24 hours have been reviewed.  CBC:   Recent Labs   Lab 08/24/22 1944   WBC 15.02*   HGB 9.3*   HCT 28.4*        CMP:   Recent Labs   Lab 08/24/22 1944   *   K 3.7   CL 95*   CO2 25   *   BUN 28*   CREATININE 7.46*   CALCIUM 9.1   PROT 6.0*   ALBUMIN 2.4*   BILITOT 0.8   ALKPHOS 70   AST 9*   ALT 11*   ANIONGAP 17*       Significant Imaging: I have reviewed all pertinent imaging results/findings within the past 24 hours.    Assessment/Plan:     * Diabetic ulcer of right heel associated with type 2 diabetes mellitus  -HgA1C pending   -ESR, CRP pending   -Wound care consult.   -Blood cultures x 2 pending   -US arterial Right LE: poorly visualized due to body habitus  -US venous Right LE: No DVT   -SS consult for possible NH placement     Pressure injury of buttock, unstageable  No abscess or drainage present.     -wound care consult.   -ESR, CRP pending  -Blood cultures x 2 pending.       ESRD on dialysis  HD on T/Th/Sat. Last HD done on Tuesday    -nephrology consult for dialysis. Appreciate assistance       History of DVT (deep vein thrombosis)  Pt reports she was diagnosed with DVT in July at [facility]. Could not get access to [place] Record at this time     -Venous doppler RLE : no DVT  -Pt has coumadin 3mg, 4mg 5mg bottles in her bags. Unclear at this time how she takes it. Will need to verify with  nurse, family members, [place] record and pharmacy AM. Will hold for now.         Type 2 diabetes mellitus  HgA1C pending  -Pt not on any diabetic meds. Will need to confirm with family and pharmacy AM   -will initiate aspart 3 TIDWM with basal insulin for now   -SSI   -POCT glucose x 4 daily     HTN (hypertension)  -amlodipine 5 QD       Leukocytosis    -Etiology unclear. Pt afebrile. Wound in foot and pressure ulcer do not appear to be  infectious. Will not continue Abx at this time. Recommendation appreciated from Wound care for further eval.      -Blood cultures x 2 pending       VTE Risk Mitigation (From admission, onward)         Ordered     heparin (porcine) injection 5,000 Units  Every 8 hours         08/25/22 0057                   Kenyetta Fischer DO  Department of Hospital Medicine   [facility] - Emergency Department

## 2022-08-25 NOTE — CONSULTS
[facility]  Wound Care  Progress Note    Patient Name: Michelle Nunes  MRN: [id number]  Admission Date: 8/24/2022  Attending Physician: Jose Luis Espinosa MD       Inpatient consult to Wound Care  Consult performed by: KARELY Fonseca  Consult ordered by: Kenyetta Fischer DO        Past Medical History:   Diagnosis Date    A-fib     Blind right eye     Diabetes mellitus     ESRD (end stage renal disease) on dialysis     GERD (gastroesophageal reflux disease)     Gout, unspecified     Heart attack     HTN (hypertension)     Kidney failure     Neuropathy         Subjective:     HPI:  Michelle Nunes is a 65 yo female with diabetic ulcer to right heel, dehisced surgical wound to left AKA, and pressure injury to buttock. She presented to emergency department with right lower extremity pain. Necrotic tissue to right heel, maceration periwound. X-ray today, Plantar greater than posterior calcaneal spurring.  No acute fracture or dislocation.  Atherosclerotic calcification demonstrated. Pt had left AKA on 5-, Dr. Cross at [facility]. Past medical history includes hypertension, ESRD on hemodialysis, atrial fibrillation,  and diabetes mellitus. She was hospitalized on 7/2/2022 with infection of the left AKA.  She was on IV antibiotics and  Wound VAC was placed. Reports she was diagnosed with DVT of her RLE at [facility] on 8/18. Venous doppler on 8/24 is negative for DVT. Wound healing is complicated by diabetes, a-fib, limited mobility, infection, anemia, and obesity.     Review of Systems   Constitutional: Positive for activity change. Negative for chills and fever.   Respiratory: Negative for chest tightness and shortness of breath.    Cardiovascular: Positive for leg swelling. Negative for chest pain and palpitations.   Musculoskeletal: Positive for arthralgias and joint swelling.   Skin: Positive for wound.        wound   Neurological: Positive for weakness.    Psychiatric/Behavioral: Negative for agitation, behavioral problems, confusion and self-injury.     Objective:     Vital Signs (Most Recent):  Temp: 98.3 °F (36.8 °C) (08/25/22 0852)  Pulse: 89 (08/25/22 0852)  Resp: 16 (08/25/22 1017)  BP: (!) 115/47 (08/25/22 0852)  SpO2: (!) 91 % (08/25/22 0852) Vital Signs (24h Range):  Temp:  [98.3 °F (36.8 °C)-99.4 °F (37.4 °C)] 98.3 °F (36.8 °C)  Pulse:  [85-99] 89  Resp:  [16-22] 16  SpO2:  [91 %-98 %] 91 %  BP: (102-165)/(47-92) 115/47     Weight: (!) 139.7 kg (308 lb)  Body mass index is 56.33 kg/m².        Physical Exam  Vitals reviewed.   Constitutional:       Appearance: She is obese.   HENT:      Head: Normocephalic.   Cardiovascular:      Rate and Rhythm: Normal rate and regular rhythm.   Pulmonary:      Effort: Pulmonary effort is normal.   Musculoskeletal:         General: Swelling, tenderness and deformity present.      Right lower leg: Edema present.      Comments: Left AKA   Skin:     Findings: Erythema present.      Comments: Wound, see LDA for photo/measurements   Neurological:      Mental Status: She is alert. Mental status is at baseline.      Motor: Weakness present.      Gait: Gait abnormal.                 Altered Skin Integrity 08/24/22 Right Heel Ulceration (Active)   08/24/22    Altered Skin Integrity Present on Admission: yes   Side: Right   Orientation:    Location: Heel   Wound Number:    Is this injury device related?:    Primary Wound Type: Ulceration   Description of Altered Skin Integrity:    Removal Indication and Assessment:    Wound Outcome:    (Retired) Wound Length (cm):    (Retired) Wound Width (cm):    (Retired) Depth (cm):    Wound Description (Comments):    Removal Indications:    Wound Image    08/25/22 1109   Description of Altered Skin Integrity Full thickness tissue loss. Base is covered by slough and/or eschar in the wound bed 08/25/22 1109   Dressing Appearance No dressing 08/25/22 1109   Drainage Amount Small 08/25/22 1109    Drainage Characteristics/Odor Serosanguineous;Malodorous 08/25/22 1109   Appearance Black;Eschar 08/25/22 1109   Tissue loss description Full thickness 08/25/22 1109   Black (%), Wound Tissue Color 100 % 08/25/22 1109   Red (%), Wound Tissue Color 0 % 08/25/22 1109   Yellow (%), Wound Tissue Color 0 % 08/25/22 1109   Periwound Area Moist 08/25/22 1109   Wound Edges Undefined 08/25/22 1109   Wound Length (cm) 3 cm 08/25/22 1109   Wound Width (cm) 3.5 cm 08/25/22 1109   Wound Depth (cm) 0.2 cm 08/25/22 1109   Wound Volume (cm^3) 2.1 cm^3 08/25/22 1109   Wound Surface Area (cm^2) 10.5 cm^2 08/25/22 1109   Care Cleansed with:;Antimicrobial agent 08/25/22 1109   Dressing Applied;Gauze, wet to dry;Gauze;Rolled gauze 08/25/22 1109   Periwound Care Moisture barrier applied 08/25/22 1109   Dressing Change Due 08/26/22 08/25/22 1109   Number of days: 1            Altered Skin Integrity 08/24/22 Left Buttocks Ulceration (Active)   08/24/22    Altered Skin Integrity Present on Admission: yes   Side: Left   Orientation:    Location: Buttocks   Wound Number:    Is this injury device related?:    Primary Wound Type: Ulceration   Description of Altered Skin Integrity:    Removal Indication and Assessment:    Wound Outcome:    (Retired) Wound Length (cm):    (Retired) Wound Width (cm):    (Retired) Depth (cm):    Wound Description (Comments):    Removal Indications:    Wound Image   08/24/22 2209   Dressing Appearance Open to air 08/24/22 2209   Appearance Adwolf 08/24/22 2209   Number of days: 1            Altered Skin Integrity 08/25/22 0204 Left anterior;medial;upper Knee Other (comment) (Active)   08/25/22 0204   Altered Skin Integrity Present on Admission: yes   Side: Left   Orientation: anterior;medial;upper   Location: Knee   Wound Number:    Is this injury device related?:    Primary Wound Type: Other   Description of Altered Skin Integrity:    Removal Indication and Assessment:    Wound Outcome:    (Retired) Wound Length  (cm):    (Retired) Wound Width (cm):    (Retired) Depth (cm):    Wound Description (Comments):    Removal Indications:    Wound Image    08/25/22 1106   Description of Altered Skin Integrity Full thickness tissue loss. Subcutaneous fat may be visible but bone, tendon or muscle are not exposed 08/25/22 1106   Dressing Appearance Moist drainage 08/25/22 1106   Drainage Amount Moderate 08/25/22 1106   Drainage Characteristics/Odor Serosanguineous 08/25/22 1106   Appearance Pink;Yellow;Moist;Granulating;Slough 08/25/22 1106   Tissue loss description Full thickness 08/25/22 1106   Black (%), Wound Tissue Color 0 % 08/25/22 1106   Red (%), Wound Tissue Color 80 % 08/25/22 1106   Yellow (%), Wound Tissue Color 20 % 08/25/22 1106   Periwound Area Moist 08/25/22 1106   Wound Edges Open 08/25/22 1106   Wound Length (cm) 4.1 cm 08/25/22 1106   Wound Width (cm) 10.5 cm 08/25/22 1106   Wound Depth (cm) 2 cm 08/25/22 1106   Wound Volume (cm^3) 86.1 cm^3 08/25/22 1106   Wound Surface Area (cm^2) 43.05 cm^2 08/25/22 1106   Care Cleansed with:;Antimicrobial agent 08/25/22 1106   Dressing Applied;Gauze, wet to dry;Hydrofiber;Gauze 08/25/22 1106   Periwound Care Moisture barrier applied 08/25/22 1106   Compression Tubular elasticized bandage 08/25/22 1106   Dressing Change Due 08/26/22 08/25/22 1106   Number of days: 0            Altered Skin Integrity 08/25/22 0206 Right Buttocks Ulceration (Active)   08/25/22 0206   Altered Skin Integrity Present on Admission: yes   Side: Right   Orientation:    Location: Buttocks   Wound Number:    Is this injury device related?:    Primary Wound Type: Ulceration   Description of Altered Skin Integrity:    Removal Indication and Assessment:    Wound Outcome:    (Retired) Wound Length (cm):    (Retired) Wound Width (cm):    (Retired) Depth (cm):    Wound Description (Comments):    Removal Indications:    Wound Image   08/25/22 0207   Dressing Appearance Open to air 08/25/22 0207   Drainage Amount  None 08/25/22 0207   Number of days: 0         Assessment and Plan      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  Clean with vashe  Apply vashe moistened drawtex to wound bed  Cover with secure wit 4x4s, abd pad, and paper tape  Change daily and PRN for drainage  Cultures pending  IV antibiotics    Pressure injury of buttock, unstageable  Clean wound with vashe  Apply sensicare around wound  Apply santyl (esther thickness) to wound bed, cover with vashe moistened 4x4  Cover and secure with abd pad and paper tape  Change daily and PRN for soilage  Turn every two hours  Low air loss mattress  Keep pressure off wound  TAP system     Diabetic ulcer of right heel associated with type 2 diabetes mellitus  Clean wound with vashe  Apply sensicare around wound  Apply santyl (esther thickness) to wound bed, cover with vashe moistened 4x4  Cover and secure with 4x4s, oralia, and paper tape  Change daily and PRN for soilage  X-ray today  Bedside debridement 8/26/22    Thank you for your consult. I will follow-up with patient. Please contact us if you have any additional questions.      Signature:  KARELY Fonseca  Wound Care    Date of encounter: 08/25/2022

## 2022-08-25 NOTE — ASSESSMENT & PLAN NOTE
Clean wound with vashe  Apply sensicare around wound  Apply santyl (esther thickness) to wound bed, cover with vashe moistened 4x4  Cover and secure with abd pad and paper tape  Change daily and PRN for soilage  Turn every two hours  Low air loss mattress  Keep pressure off wound  TAP system

## 2022-08-25 NOTE — ASSESSMENT & PLAN NOTE
After wound vac was removed for further eval, light yellow drainage was noted from stump with foul-odor.Pt has recent surgery for left AKA secondary to diabetic wound in May, 2022 at [facility].     -Vanc,aztreonam and metronidazole   -Wound culture, anaerobe culture pending  -Blood cultures x 2 pending   -wound care consult.

## 2022-08-25 NOTE — ASSESSMENT & PLAN NOTE
Pt reports she was diagnosed with DVT in July at [facility]. Could not get access to [facility] Record at this time     -Venous doppler RLE : no DVT  -Pt has coumadin 3mg, 4mg 5mg bottles in her bags. Unclear at this time how she takes it. Will need to verify with  nurse, family members, [facility] record and pharmacy AM. Will hold for now.

## 2022-08-25 NOTE — H&P
[facility] - Emergency Department  Hospital Medicine  History & Physical    Patient Name: Michelle Nunes  MRN: [id number]  Patient Class: IP- Inpatient  Admission Date: 8/24/2022  Attending Physician: Jose Luis Espinosa MD   Primary Care Provider: Primary Doctor No         Patient information was obtained from patient, relative(s) and ER records.     Subjective:     Principal Problem:Diabetic ulcer of right heel associated with type 2 diabetes mellitus    Chief Complaint:   Chief Complaint   Patient presents with    Foot Pain        HPI: A 67 yo female presents from [facility] with cc of RLE pain. Pt has Home health and told HH nurse that she was having RLE pain and brought to ED by her daughter. Pt had LLE above knee amputation in May, 2022 secondary to diabetic wound at [facility]. Wound VAC placed on LLE stump in May as she developed wound infection. Pt states this time she developed a sore on the right heel with ulceration which is causing her a lot of pain. Pt states she has pain on her lower back and buttocks as well. Pt also states she has been diagnosed with DVT of her RLE at [facility] on 8/18 and started taking lovenox 120mg on 8/23 and 8/24. Also coumadin dosage for A fib was increased to 5mg.  Pt denies fever, chills, sob, chest pain or abdominal pain. Pt was very tearful during exam and I personally spoke with her daughter, Mon at bedside and she states pt has been emotional, not eating well and not taking good care of herself at home over the last few weeks. Pt lives with her granddaughter at home.     ED workup include VS: 165/70, HR 95, RR 22, 99.4F, 98spO2. Pt has leukocytosis with WBC 15, BUN/CR 28/7.46 with ESRD HD on tues/thurs/sat. lactic acid WNL. Blood cx x 2 obtained prior to starting Abx. Pt was given van and aztreonam considering she has  penicillin allergy. Pt will be admitted for further work up and management.       Past Medical History:   Diagnosis Date     Diabetes mellitus     ESRD (end stage renal disease) on dialysis     Gout, unspecified     Heart attack     HTN (hypertension)     Kidney failure     Neuropathy        Past Surgical History:   Procedure Laterality Date    CHOLECYSTECTOMY      HYSTERECTOMY      LEG AMPUTATION THROUGH KNEE Left 07/05/2022       Review of patient's allergies indicates:   Allergen Reactions    Milk containing products     Pcn [penicillins]        No current facility-administered medications on file prior to encounter.     Current Outpatient Medications on File Prior to Encounter   Medication Sig    warfarin (COUMADIN) 3 MG tablet 3 mg by Per G Tube route Daily. Take daily Tues through Sunday    warfarin (COUMADIN) 4 MG tablet Take 4 mg by mouth every Monday.    allopurinoL (ZYLOPRIM) 100 MG tablet Take 100 mg by mouth once daily.    amLODIPine (NORVASC) 5 MG tablet Take 1 tablet by mouth once daily.    atorvastatin (LIPITOR) 40 MG tablet Take 40 mg by mouth every evening.    EScitalopram oxalate (LEXAPRO) 10 MG tablet Take 10 mg by mouth every evening.    gabapentin (NEURONTIN) 100 MG capsule Take 200 mg by mouth every evening.    HYDROcodone-acetaminophen (NORCO) 7.5-325 mg per tablet Take 1 tablet by mouth every 6 (six) hours as needed.    levothyroxine (SYNTHROID) 100 MCG tablet Take 100 mcg by mouth every morning.    megestroL (MEGACE) 40 MG Tab Take 40 mg by mouth every morning.    melatonin (MELATIN) 3 mg tablet Take 3 mg by mouth every evening. Take 2 tablets by mouth at bedtime    pantoprazole (PROTONIX) 40 MG tablet Take 40 mg by mouth once daily.    VITAMIN D3 125 mcg (5,000 unit) Tab Take 1 tablet by mouth once daily.     Family History       Problem Relation (Age of Onset)    Cancer Brother    Cirrhosis Mother    Colon cancer Brother    Diabetes Father    Hypertension Father          Tobacco Use    Smoking status: Never Smoker    Smokeless tobacco: Never Used   Substance and Sexual Activity     Alcohol use: Never    Drug use: Never    Sexual activity: Not on file     Review of Systems   Constitutional:  Negative for chills and fever.   Respiratory:  Negative for cough, chest tightness, shortness of breath and wheezing.    Cardiovascular:  Negative for chest pain.   Gastrointestinal:  Negative for abdominal pain, nausea and vomiting.   Musculoskeletal:  Positive for back pain.        Pain in RLE and foot, pain in buttocks    Skin:  Positive for wound.   Objective:     Vital Signs (Most Recent):  Temp: 99.4 °F (37.4 °C) (08/24/22 1730)  Pulse: 95 (08/24/22 1730)  Resp: 18 (08/24/22 2151)  BP: (!) 165/70 (08/24/22 1730)  SpO2: 98 % (08/24/22 1730)   Vital Signs (24h Range):  Temp:  [99.4 °F (37.4 °C)] 99.4 °F (37.4 °C)  Pulse:  [95] 95  Resp:  [18-22] 18  SpO2:  [98 %] 98 %  BP: (165)/(70) 165/70     Weight: 136.1 kg (300 lb)  Body mass index is 54.87 kg/m².    Physical Exam  Constitutional:       Appearance: She is obese. She is not toxic-appearing or diaphoretic.   HENT:      Head: Normocephalic and atraumatic.      Mouth/Throat:      Pharynx: Oropharynx is clear.   Eyes:      Comments: Cloudy corneal edema in right eye complicated by cataract surgery. Right eye blindness   Cardiovascular:      Rate and Rhythm: irregularly irregular    Pulmonary:      Breath sounds: Normal breath sounds. No wheezing, rhonchi or rales.   Abdominal:      General: Bowel sounds are normal.      Palpations: Abdomen is soft.   Musculoskeletal:      Right lower leg: No edema.      Left lower leg: No edema.      Comments: Left AKA with wound vac in place       Left Lower Extremity: Left leg is amputated above knee.   Feet:      Right foot:      Skin integrity: Ulcer present.   Skin:     Comments: Approx 4 cm round eschar noted to the right heel. Sacral ulcer noted on left buttock.    Neurological:      General: No focal deficit present.      Mental Status: She is alert.   Psychiatric:      Comments: Tearful            Significant  Labs: All pertinent labs within the past 24 hours have been reviewed.  CBC:   Recent Labs   Lab 08/24/22 1944   WBC 15.02*   HGB 9.3*   HCT 28.4*        CMP:   Recent Labs   Lab 08/24/22 1944   *   K 3.7   CL 95*   CO2 25   *   BUN 28*   CREATININE 7.46*   CALCIUM 9.1   PROT 6.0*   ALBUMIN 2.4*   BILITOT 0.8   ALKPHOS 70   AST 9*   ALT 11*   ANIONGAP 17*       Significant Imaging: I have reviewed all pertinent imaging results/findings within the past 24 hours.    Assessment/Plan:     * Infection of amputation stump  After wound vac was removed for further eval, light yellow drainage was noted from stump with foul-odor.Pt has recent surgery for left AKA secondary to diabetic wound in May, 2022 at [facility].     -Vanc,aztreonam and metronidazole   -Wound culture, anaerobe culture pending  -Blood cultures x 2 pending   -wound care consult.     Diabetic ulcer of right heel associated with type 2 diabetes mellitus  -HgA1C pending   -ESR 69, CRP 18.40   -Wound care consult.   -Blood cultures x 2 pending   SS consult for possible NH placement     Pressure injury of buttock, unstageable  No abscess or drainage present.   -wound care consult.   -ESR, CRP elevated   -Blood cultures x 2 pending.       ESRD on dialysis  HD on T/Th/Sat. Last HD done on Tuesday    -nephrology consult for dialysis. Appreciate assistance       Type 2 diabetes mellitus  Pt takes 70/30 novolin 25 units QAM and 25 units QHS.with sliding scale at home.     HgA1C pending  -will initiate aspart 3 TIDWM with 10 units basal insulin for now   -SSI   -POCT glucose x 4 daily     Deep vein thrombosis (DVT) of lower extremity  -pt was diagnosed with DVT within the right mid/distal popliteal vein on 8/18/22. Record obtained from [facility]. Venous doppler RLE performed in [facility] ED showing no evidence of DVT.     -Pt was started on lovenox 120mg QD (started 8/23) with increased dosage of coumadin to 5mg prescribed from [facility]  hospital.Will hold these anticoagulants for now for possible procedure tomorrow            HTN (hypertension)  -amlodipine 5 QD       Atrial fibrillation, controlled  Sister states patient has been taking coumadin for long time 3mg to 4mg then when she went to [facility] about 2 weeks ago, pt was diagnosed with DVT and coumadin dosage was increased to 5mg. Sister states pt started taking coumadin 5 mg 2 days ago(8/23)  after she picked up the medicine. INR is 1.86.  she was also prescribed lovenox 120mg daily for DVT  and given lovenox injection daily (8/23, 8/24). Will hold on anticoagulants at this time for possible procedure     -Rate controlled  -tele            VTE Risk Mitigation (From admission, onward)         Ordered     Place sequential compression device  Until discontinued         08/25/22 6485                   Kenyetta Fischer DO  Department of Hospital Medicine   [facility] - Emergency Department

## 2022-08-25 NOTE — PLAN OF CARE
Problem: Adult Inpatient Plan of Care  Goal: Plan of Care Review  Outcome: Ongoing, Progressing  Goal: Patient-Specific Goal (Individualized)  Outcome: Ongoing, Progressing  Goal: Absence of Hospital-Acquired Illness or Injury  Outcome: Ongoing, Progressing  Goal: Optimal Comfort and Wellbeing  Outcome: Ongoing, Progressing  Goal: Readiness for Transition of Care  Outcome: Ongoing, Progressing     Problem: Bariatric Environmental Safety  Goal: Safety Maintained with Care  Outcome: Ongoing, Progressing     Problem: Skin Injury Risk Increased  Goal: Skin Health and Integrity  Outcome: Ongoing, Progressing     Problem: Fall Injury Risk  Goal: Absence of Fall and Fall-Related Injury  Outcome: Ongoing, Progressing     Problem: Device-Related Complication Risk (Hemodialysis)  Goal: Safe, Effective Therapy Delivery  Outcome: Ongoing, Progressing     Problem: Hemodynamic Instability (Hemodialysis)  Goal: Effective Tissue Perfusion  Outcome: Ongoing, Progressing     Problem: Infection (Hemodialysis)  Goal: Absence of Infection Signs and Symptoms  Outcome: Ongoing, Progressing     Problem: Diabetes Comorbidity  Goal: Blood Glucose Level Within Targeted Range  Outcome: Ongoing, Progressing     Problem: Infection  Goal: Absence of Infection Signs and Symptoms  Outcome: Ongoing, Progressing     Problem: Impaired Wound Healing  Goal: Optimal Wound Healing  Outcome: Ongoing, Progressing

## 2022-08-25 NOTE — ASSESSMENT & PLAN NOTE
-Etiology unclear. Pt afebrile. Wound in foot and pressure ulcer do not appear to be infectious. Will not continue Abx at this time. Recommendation appreciated from Wound care for further eval.      -Blood cultures x 2 pending

## 2022-08-25 NOTE — PROGRESS NOTES
[facility] - Emergency Department  Hospital Medicine  Progress Note    Patient Name: Michelle Nunes  MRN: [id number]  Patient Class: IP- Inpatient   Admission Date: 8/24/2022  Length of Stay: 1 days  Attending Physician: Jose Luis Espinosa MD  Primary Care Provider: Primary Doctor No        Subjective:     Principal Problem:Infection of amputation stump        HPI:  A 65 yo female presents from [facility] with cc of RLE pain. Pt has Home health and told HH nurse that she was having RLE pain and brought to ED by her daughter. 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 Wound VAC placed on LLE stump in May as she developed wound infection. Pt states this time she developed a sore on the right heel with ulceration which is causing her a lot of pain. Pt states she has pain on her lower back and buttocks as well. Pt also states she has been diagnosed with DVT of her RLE at [facility] on 8/18 and started taking lovenox 120mg on 8/23 and 8/24. Also coumadin dosage for A fib was increased to 5mg.  Pt denies fever, chills, sob, chest pain or abdominal pain. Pt was very tearful during exam and I personally spoke with her daughter, Mon at bedside and she states pt has been emotional, not eating well and not taking good care of herself at home over the last few weeks. Pt lives with her granddaughter at home.     ED workup include VS: 165/70, HR 95, RR 22, 99.4F, 98spO2. Pt has leukocytosis with WBC 15, BUN/CR 28/7.46 with ESRD HD on tues/thurs/sat. lactic acid WNL. Blood cx x 2 obtained prior to starting Abx. Pt was given van and aztreonam considering she has  penicillin allergy. Pt will be admitted for further work up and management.       Overview/Hospital Course:  08/25 Patient seen in ER. Lab unable to obtain specimen. Poor access. Will order midline. Nephrology consult for dialysis regimen (TTS). Wound consult.       Past Medical History:   Diagnosis Date     Diabetes mellitus     ESRD (end stage renal disease) on dialysis     Gout, unspecified     Heart attack     HTN (hypertension)     Kidney failure     Neuropathy        Past Surgical History:   Procedure Laterality Date    CHOLECYSTECTOMY      HYSTERECTOMY      LEG AMPUTATION THROUGH KNEE Left 07/05/2022       Review of patient's allergies indicates:   Allergen Reactions    Milk containing products     Pcn [penicillins]        No current facility-administered medications on file prior to encounter.     Current Outpatient Medications on File Prior to Encounter   Medication Sig    warfarin (COUMADIN) 3 MG tablet 3 mg by Per G Tube route Daily. Take daily Tues through Sunday    warfarin (COUMADIN) 4 MG tablet Take 4 mg by mouth every Monday.    allopurinoL (ZYLOPRIM) 100 MG tablet Take 100 mg by mouth once daily.    amLODIPine (NORVASC) 5 MG tablet Take 1 tablet by mouth once daily.    atorvastatin (LIPITOR) 40 MG tablet Take 40 mg by mouth every evening.    EScitalopram oxalate (LEXAPRO) 10 MG tablet Take 10 mg by mouth every evening.    gabapentin (NEURONTIN) 100 MG capsule Take 200 mg by mouth every evening.    HYDROcodone-acetaminophen (NORCO) 7.5-325 mg per tablet Take 1 tablet by mouth every 6 (six) hours as needed.    levothyroxine (SYNTHROID) 100 MCG tablet Take 100 mcg by mouth every morning.    megestroL (MEGACE) 40 MG Tab Take 40 mg by mouth every morning.    melatonin (MELATIN) 3 mg tablet Take 3 mg by mouth every evening. Take 2 tablets by mouth at bedtime    pantoprazole (PROTONIX) 40 MG tablet Take 40 mg by mouth once daily.    VITAMIN D3 125 mcg (5,000 unit) Tab Take 1 tablet by mouth once daily.     Family History       Problem Relation (Age of Onset)    Cancer Brother    Cirrhosis Mother    Colon cancer Brother    Diabetes Father    Hypertension Father          Tobacco Use    Smoking status: Never Smoker    Smokeless tobacco: Never Used   Substance and Sexual Activity     Alcohol use: Never    Drug use: Never    Sexual activity: Not on file     Review of Systems   Constitutional:  Negative for chills and fever.   Respiratory:  Negative for cough, chest tightness, shortness of breath and wheezing.    Cardiovascular:  Negative for chest pain.   Gastrointestinal:  Negative for abdominal pain, nausea and vomiting.   Musculoskeletal:  Positive for back pain.        Pain in RLE and foot, pain in buttocks    Skin:  Positive for wound.   Objective:     Vital Signs (Most Recent):  Temp: 99.4 °F (37.4 °C) (08/24/22 1730)  Pulse: 95 (08/24/22 1730)  Resp: 18 (08/24/22 2151)  BP: (!) 165/70 (08/24/22 1730)  SpO2: 98 % (08/24/22 1730)   Vital Signs (24h Range):  Temp:  [99.4 °F (37.4 °C)] 99.4 °F (37.4 °C)  Pulse:  [95] 95  Resp:  [18-22] 18  SpO2:  [98 %] 98 %  BP: (165)/(70) 165/70     Weight: 136.1 kg (300 lb)  Body mass index is 54.87 kg/m².    Physical Exam  Constitutional:       Appearance: She is obese. She is not toxic-appearing or diaphoretic.   HENT:      Head: Normocephalic and atraumatic.      Mouth/Throat:      Pharynx: Oropharynx is clear.   Eyes:      Comments: Cloudy corneal edema in right eye complicated by cataract surgery. Right eye blindness   Cardiovascular:      Rate and Rhythm: Normal rate and regular rhythm.      Pulses:           Dorsalis pedis pulses are 0 on the right side.      Heart sounds: Normal heart sounds.   Pulmonary:      Breath sounds: Normal breath sounds. No wheezing, rhonchi or rales.   Abdominal:      General: Bowel sounds are normal.      Palpations: Abdomen is soft.   Musculoskeletal:      Right lower leg: No edema.      Left lower leg: No edema.      Comments: Left AKA with wound vac in place       Left Lower Extremity: Left leg is amputated above knee.   Feet:      Right foot:      Skin integrity: Ulcer present.   Skin:     Findings: Lesion present.      Comments: Approx 4 cm round eschar noted to the right heel. Sacral ulcer noted on left buttock.  Dialysis catheter present below left clavicle    Neurological:      General: No focal deficit present.      Mental Status: She is alert.           Significant Labs: All pertinent labs within the past 24 hours have been reviewed.  CBC:   Recent Labs   Lab 08/24/22 1944   WBC 15.02*   HGB 9.3*   HCT 28.4*        CMP:   Recent Labs   Lab 08/24/22 1944   *   K 3.7   CL 95*   CO2 25   *   BUN 28*   CREATININE 7.46*   CALCIUM 9.1   PROT 6.0*   ALBUMIN 2.4*   BILITOT 0.8   ALKPHOS 70   AST 9*   ALT 11*   ANIONGAP 17*       Significant Imaging: I have reviewed all pertinent imaging results/findings within the past 24 hours.      Assessment/Plan:      * Infection of amputation stump  After wound vac was removed for further eval, light yellow drainage was noted from stump with foul-odor.Pt has recent surgery for left AKA secondary to diabetic wound in May, 2022 at [facility].     -Vanc,aztreonam and metronidazole   -Wound culture, anaerobe culture pending  -Blood cultures x 2 pending   -wound care consult.     Atrial fibrillation, controlled  Sister states patient has been taking coumadin for long time 3mg to 4mg then when she went to [facility] about 2 weeks ago, pt was diagnosed with DVT and coumadin dosage was increased to 5mg. Sister states pt started taking coumadin 5 mg 2 days ago(8/23)  after she picked up the medicine. INR is 1.86.  she was also prescribed lovenox 120mg daily for DVT  and given lovenox injection daily (8/23, 8/24). Will hold on anticoagulants at this time for possible procedure     -Rate controlled  -tele            Pressure injury of buttock, unstageable  No abscess or drainage present.   -wound care consult.   -ESR, CRP elevated   -Blood cultures x 2 pending.       HTN (hypertension)  -amlodipine 5 QD       Type 2 diabetes mellitus  Pt takes 70/30 novolin 25 units QAM and 25 units QHS.with sliding scale at home.     HgA1C pending  -will initiate aspart 3 TIDWM with 10  units basal insulin for now   -SSI   -POCT glucose x 4 daily     ESRD on dialysis  HD on T/Th/Sat. Last HD done on Tuesday    -nephrology consult for dialysis. Appreciate assistance       Deep vein thrombosis (DVT) of lower extremity  -pt was diagnosed with DVT within the right mid/distal popliteal vein on 8/18/22. Record obtained from [facility]. Venous doppler RLE performed in [facility] ED showing no evidence of DVT.     -Pt was started on lovenox 120mg QD (started 8/23) with increased dosage of coumadin to 5mg prescribed from [facility].Will hold these anticoagulants for now for possible procedure tomorrow            Diabetic ulcer of right heel associated with type 2 diabetes mellitus  -HgA1C pending   -ESR 69, CRP 18.40   -Wound care consult.   -Blood cultures x 2 pending    consult for possible NH placement       VTE Risk Mitigation (From admission, onward)         Ordered     Place sequential compression device  Until discontinued         08/25/22 0326                Discharge Planning   JUN:      Code Status: Full Code   Is the patient medically ready for discharge?:     Reason for patient still in hospital (select all that apply): Other (specify) IV antibiotics. Wound consult. Nephrology consult                     Jose Luis Espinosa MD  Department of Hospital Medicine   [facility] - Emergency Department

## 2022-08-25 NOTE — ASSESSMENT & PLAN NOTE
HD on T/Th/Sat. Last HD done on Tuesday    -nephrology consult for dialysis. Appreciate assistance

## 2022-08-25 NOTE — ASSESSMENT & PLAN NOTE
-Etiology unclear. Pt afebrile. Wound in foot and pressure ulcer do not appear to be infectious. Will not continue Abx at this time. Recommendation appreciated from Wound care for further eval.      -U/A pending  -Blood cultures x 2 pending

## 2022-08-25 NOTE — SUBJECTIVE & OBJECTIVE
Past Medical History:   Diagnosis Date    Diabetes mellitus     ESRD (end stage renal disease) on dialysis     Gout, unspecified     Heart attack     HTN (hypertension)     Kidney failure     Neuropathy        Past Surgical History:   Procedure Laterality Date    CHOLECYSTECTOMY      HYSTERECTOMY      LEG AMPUTATION THROUGH KNEE Left 07/05/2022       Review of patient's allergies indicates:   Allergen Reactions    Milk containing products     Pcn [penicillins]        No current facility-administered medications on file prior to encounter.     Current Outpatient Medications on File Prior to Encounter   Medication Sig    warfarin (COUMADIN) 3 MG tablet 3 mg by Per G Tube route Daily. Take daily Tues through Sunday    warfarin (COUMADIN) 4 MG tablet Take 4 mg by mouth every Monday.    allopurinoL (ZYLOPRIM) 100 MG tablet Take 100 mg by mouth once daily.    amLODIPine (NORVASC) 5 MG tablet Take 1 tablet by mouth once daily.    atorvastatin (LIPITOR) 40 MG tablet Take 40 mg by mouth every evening.    EScitalopram oxalate (LEXAPRO) 10 MG tablet Take 10 mg by mouth every evening.    gabapentin (NEURONTIN) 100 MG capsule Take 200 mg by mouth every evening.    HYDROcodone-acetaminophen (NORCO) 7.5-325 mg per tablet Take 1 tablet by mouth every 6 (six) hours as needed.    levothyroxine (SYNTHROID) 100 MCG tablet Take 100 mcg by mouth every morning.    megestroL (MEGACE) 40 MG Tab Take 40 mg by mouth every morning.    melatonin (MELATIN) 3 mg tablet Take 3 mg by mouth every evening. Take 2 tablets by mouth at bedtime    pantoprazole (PROTONIX) 40 MG tablet Take 40 mg by mouth once daily.    VITAMIN D3 125 mcg (5,000 unit) Tab Take 1 tablet by mouth once daily.     Family History       Problem Relation (Age of Onset)    Cancer Brother    Cirrhosis Mother    Colon cancer Brother    Diabetes Father    Hypertension Father          Tobacco Use    Smoking status: Never Smoker    Smokeless tobacco: Never Used   Substance and Sexual  Activity    Alcohol use: Never    Drug use: Never    Sexual activity: Not on file     Review of Systems   Constitutional:  Negative for chills and fever.   Respiratory:  Negative for cough, chest tightness, shortness of breath and wheezing.    Cardiovascular:  Negative for chest pain.   Gastrointestinal:  Negative for abdominal pain, nausea and vomiting.   Musculoskeletal:  Positive for back pain.        Pain in RLE and foot, pain in buttocks    Skin:  Positive for wound.   Objective:     Vital Signs (Most Recent):  Temp: 99.4 °F (37.4 °C) (08/24/22 1730)  Pulse: 95 (08/24/22 1730)  Resp: 18 (08/24/22 2151)  BP: (!) 165/70 (08/24/22 1730)  SpO2: 98 % (08/24/22 1730)   Vital Signs (24h Range):  Temp:  [99.4 °F (37.4 °C)] 99.4 °F (37.4 °C)  Pulse:  [95] 95  Resp:  [18-22] 18  SpO2:  [98 %] 98 %  BP: (165)/(70) 165/70     Weight: 136.1 kg (300 lb)  Body mass index is 54.87 kg/m².    Physical Exam  Constitutional:       Appearance: She is obese. She is not toxic-appearing or diaphoretic.   HENT:      Head: Normocephalic and atraumatic.      Mouth/Throat:      Pharynx: Oropharynx is clear.   Eyes:      Comments: Cloudy corneal edema in right eye complicated by cataract surgery. Right eye blindness   Cardiovascular:      Rate and Rhythm: Normal rate and regular rhythm.      Pulses:           Dorsalis pedis pulses are 0 on the right side.      Heart sounds: Normal heart sounds.   Pulmonary:      Breath sounds: Normal breath sounds. No wheezing, rhonchi or rales.   Abdominal:      General: Bowel sounds are normal.      Palpations: Abdomen is soft.   Musculoskeletal:      Right lower leg: No edema.      Left lower leg: No edema.      Comments: Left AKA with wound vac in place       Left Lower Extremity: Left leg is amputated above knee.   Feet:      Right foot:      Skin integrity: Ulcer present.   Skin:     Findings: Lesion present.      Comments: Approx 4 cm round eschar noted to the right heel. Sacral ulcer noted on left  buttock. Dialysis catheter present below left clavicle    Neurological:      General: No focal deficit present.      Mental Status: She is alert.           Significant Labs: All pertinent labs within the past 24 hours have been reviewed.  CBC:   Recent Labs   Lab 08/24/22 1944   WBC 15.02*   HGB 9.3*   HCT 28.4*        CMP:   Recent Labs   Lab 08/24/22 1944   *   K 3.7   CL 95*   CO2 25   *   BUN 28*   CREATININE 7.46*   CALCIUM 9.1   PROT 6.0*   ALBUMIN 2.4*   BILITOT 0.8   ALKPHOS 70   AST 9*   ALT 11*   ANIONGAP 17*       Significant Imaging: I have reviewed all pertinent imaging results/findings within the past 24 hours.

## 2022-08-25 NOTE — PROGRESS NOTES
Pharmacy consulted for vancomycin dosing. Patient was given vancomycin 1500 mg IV x 1 dose in ER 8/24/22 2300 and is in ESRD on hemodialysis. We will check a vancomycin level after dialysis 8/25/22 and redose vancomycin when level < 20 mcg/mL. Pharmacy will monitor daily and adjust as necessary.

## 2022-08-25 NOTE — ASSESSMENT & PLAN NOTE
-HgA1C pending   -ESR 69, CRP 18.40   -Wound care consult.   -Blood cultures x 2 pending    consult for possible NH placement

## 2022-08-25 NOTE — NURSING
Report given to michelle cummins on [address], will transport from dialysis to 561 when finished with hd

## 2022-08-25 NOTE — ASSESSMENT & PLAN NOTE
Clean wound with vashe  Apply sensicare around wound  Apply santyl (esther thickness) to wound bed, cover with vashe moistened 4x4  Cover and secure with 4x4s, oralia, and paper tape  Change daily and PRN for soilage  X-ray today  Bedside debridement 8/26/22

## 2022-08-25 NOTE — ASSESSMENT & PLAN NOTE
-pt was diagnosed with DVT within the right mid/distal popliteal vein on 8/18/22. Record obtained from [facility]. Venous doppler RLE performed in [facility] ED showing no evidence of DVT.     -Pt was started on lovenox 120mg QD (started 8/23) with increased dosage of coumadin to 5mg prescribed from [facility].Will hold these anticoagulants for now for possible procedure tomorrow

## 2022-08-25 NOTE — PLAN OF CARE
[facility]  [facility] Therapy    Date: 2020  Patient Name: Kelle Buitrago        : 1929       [] Pt Refusal           [x] Pt Unavailable due to:  Patient still eating. Will check back later and progress as able.         Leda Thompson, PTA    [facility]  Initial Discharge Assessment       Primary Care Provider: Primary Doctor No    Admission Diagnosis: Atrial fibrillation [I48.91]  Leg pain [M79.606]  General weakness [R53.1]  Diabetic foot [E11.8]  Infection of amputation stump [T87.40]  ESRD on dialysis [N18.6, Z99.2]  Atrial fibrillation, controlled [I48.91]  Deep vein thrombosis (DVT) of proximal lower extremity, unspecified chronicity, unspecified laterality [I82.4Y9]  Diabetic ulcer of right heel associated with type 2 diabetes mellitus, unspecified ulcer stage [E11.621, L97.419]  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 [L97.125]  Hypertension, unspecified type [I10]  Type 2 diabetes mellitus with other circulatory complication, unspecified whether long term insulin use [E11.59]  Pressure injury of buttock, unstageable, unspecified laterality [L89.300]  Sepsis, due to unspecified organism, unspecified whether acute organ dysfunction present [A41.9]    Admission Date: 8/24/2022  Expected Discharge Date:     Discharge Barriers Identified: Bariatric    Payor: [employer] / Plan: [employer] MEDICARE HMO / Product Type: Medicare Advantage /     Extended Emergency Contact Information  Primary Emergency Contact: Barbi wolf  Mobile Phone: [phone]  Relation: Relative  Preferred language: English   needed? No    Discharge Plan A: New Nursing Home placement - [facility] care facility  Discharge Plan B: Home with family, Home Health    No Pharmacies Listed    Initial Assessment (most recent)     Adult Discharge Assessment - 08/25/22 1550        Discharge Assessment    Assessment Type Discharge Planning Assessment     Source of Information patient;family     Communicated JUN with patient/caregiver Date not available/Unable to determine     Lives With child(alonzo), adult     Do you expect to return to your current living situation? No     Do you have help at  home or someone to help you manage your care at home? Yes     Who are your caregiver(s) and their phone number(s)? Barbi Nunes (daughter) [phone]     Prior to hospitilization cognitive status: Alert/Oriented     Current cognitive status: Alert/Oriented     Walking or Climbing Stairs Difficulty ambulation difficulty, dependent;ambulation difficulty, requires equipment     Dressing/Bathing Difficulty bathing difficulty, requires equipment;bathing difficulty, dependent     Home Accessibility wheelchair accessible     Home Layout Able to live on 1st floor     Equipment Currently Used at Home oxygen;wound care supplies;hospital bed;lift device;grab bar     Readmission within 30 days? No     Patient currently being followed by outpatient case management? No     Do you currently have service(s) that help you manage your care at home? Yes     Name and Contact number of agency [facility]     Is the pt/caregiver preference to resume services with current agency Yes     Do you take prescription medications? Yes     Do you have prescription coverage? Yes     Do you have any problems affording any of your prescribed medications? No     Is the patient taking medications as prescribed? yes     Who is going to help you get home at discharge? Barbi Nunes (daughter) [phone]     How do you get to doctors appointments? other (see comments)   Non-emergent [employer] transportation    Are you on dialysis? Yes     Dialysis Name and Scheduled days [facility] [address] [address] [facility]     Do you take coumadin? Yes     Discharge Plan A New Nursing Home placement - detention care facility     Discharge Plan B Home with family;Home Health     DME Needed Upon Discharge  none     Discharge Plan discussed with: Patient     Discharge Barriers Identified Bariatric        Relationship/Environment    Name(s) of Who Lives With Patient Barbi Nunes (daughter) [phone]               SS received consult for discharge planning. Per chart review,  patient will most likely need swb or LTAC placement at discharge (per Dr. Fischer's note). SS spoke with patient and her daughter, Barbi. Patient lives with her daughter. Her daughter reports that they are no longer able to take care of patient at home d/t her requiring more medical care than they can provide at home. She states patient has been refusing to go to dialysis and her other doctor's appointments periodically. Patient requires non emergent [employer] transportation to get to her appointments. Patient is current with [facility] and is on the Medicaid Waiver program which provides a sitter periodically. SS discussed swb/SNF placement for patient. Choice obtained for [facility]. Referral faxed and called to Erika at [facility]. SS following.

## 2022-08-25 NOTE — ASSESSMENT & PLAN NOTE
No abscess or drainage present.   -wound care consult.   -ESR, CRP elevated   -Blood cultures x 2 pending.

## 2022-08-26 PROBLEM — I95.9 HYPOTENSION: Status: ACTIVE | Noted: 2022-08-26

## 2022-08-26 LAB
ANION GAP SERPL CALCULATED.3IONS-SCNC: 13 MMOL/L (ref 7–16)
BASOPHILS # BLD AUTO: 0.04 K/UL (ref 0–0.2)
BASOPHILS NFR BLD AUTO: 0.2 % (ref 0–1)
BUN SERPL-MCNC: 19 MG/DL (ref 7–18)
BUN/CREAT SERPL: 3 (ref 6–20)
CALCIUM SERPL-MCNC: 8.7 MG/DL (ref 8.5–10.1)
CHLORIDE SERPL-SCNC: 98 MMOL/L (ref 98–107)
CO2 SERPL-SCNC: 26 MMOL/L (ref 21–32)
CREAT SERPL-MCNC: 5.82 MG/DL (ref 0.55–1.02)
DIFFERENTIAL METHOD BLD: ABNORMAL
EGFR (NO RACE VARIABLE) (RUSH/TITUS): 8 ML/MIN/1.73M²
EOSINOPHIL # BLD AUTO: 0.13 K/UL (ref 0–0.5)
EOSINOPHIL NFR BLD AUTO: 0.8 % (ref 1–4)
ERYTHROCYTE [DISTWIDTH] IN BLOOD BY AUTOMATED COUNT: 18.1 % (ref 11.5–14.5)
GLUCOSE SERPL-MCNC: 106 MG/DL (ref 74–106)
GLUCOSE SERPL-MCNC: 145 MG/DL (ref 70–105)
GLUCOSE SERPL-MCNC: 74 MG/DL (ref 70–105)
GLUCOSE SERPL-MCNC: 85 MG/DL (ref 70–105)
GLUCOSE SERPL-MCNC: 90 MG/DL (ref 70–105)
HCT VFR BLD AUTO: 30 % (ref 38–47)
HGB BLD-MCNC: 9.3 G/DL (ref 12–16)
IMM GRANULOCYTES # BLD AUTO: 0.19 K/UL (ref 0–0.04)
IMM GRANULOCYTES NFR BLD: 1.1 % (ref 0–0.4)
LYMPHOCYTES # BLD AUTO: 1.06 K/UL (ref 1–4.8)
LYMPHOCYTES NFR BLD AUTO: 6.3 % (ref 27–41)
MCH RBC QN AUTO: 30.7 PG (ref 27–31)
MCHC RBC AUTO-ENTMCNC: 31 G/DL (ref 32–36)
MCV RBC AUTO: 99 FL (ref 80–96)
MONOCYTES # BLD AUTO: 0.79 K/UL (ref 0–0.8)
MONOCYTES NFR BLD AUTO: 4.7 % (ref 2–6)
MPC BLD CALC-MCNC: 10.6 FL (ref 9.4–12.4)
NEUTROPHILS # BLD AUTO: 14.7 K/UL (ref 1.8–7.7)
NEUTROPHILS NFR BLD AUTO: 86.9 % (ref 53–65)
NRBC # BLD AUTO: 0 X10E3/UL
NRBC, AUTO (.00): 0 %
PLATELET # BLD AUTO: 229 K/UL (ref 150–400)
POTASSIUM SERPL-SCNC: 3.8 MMOL/L (ref 3.5–5.1)
RBC # BLD AUTO: 3.03 M/UL (ref 4.2–5.4)
SODIUM SERPL-SCNC: 133 MMOL/L (ref 136–145)
VERIGENE RESULT: ABNORMAL
WBC # BLD AUTO: 16.91 K/UL (ref 4.5–11)

## 2022-08-26 PROCEDURE — 99233 PR SUBSEQUENT HOSPITAL CARE,LEVL III: ICD-10-PCS | Mod: ,,, | Performed by: INTERNAL MEDICINE

## 2022-08-26 PROCEDURE — 63600175 PHARM REV CODE 636 W HCPCS: Performed by: FAMILY MEDICINE

## 2022-08-26 PROCEDURE — 25000003 PHARM REV CODE 250: Performed by: FAMILY MEDICINE

## 2022-08-26 PROCEDURE — 94761 N-INVAS EAR/PLS OXIMETRY MLT: CPT

## 2022-08-26 PROCEDURE — 99233 SBSQ HOSP IP/OBS HIGH 50: CPT | Mod: ,,, | Performed by: INTERNAL MEDICINE

## 2022-08-26 PROCEDURE — S0030 INJECTION, METRONIDAZOLE: HCPCS | Performed by: FAMILY MEDICINE

## 2022-08-26 PROCEDURE — 85025 COMPLETE CBC W/AUTO DIFF WBC: CPT | Performed by: INTERNAL MEDICINE

## 2022-08-26 PROCEDURE — 20000000 HC ICU ROOM

## 2022-08-26 PROCEDURE — 25000003 PHARM REV CODE 250

## 2022-08-26 PROCEDURE — 27000221 HC OXYGEN, UP TO 24 HOURS

## 2022-08-26 PROCEDURE — 82962 GLUCOSE BLOOD TEST: CPT

## 2022-08-26 PROCEDURE — 36415 COLL VENOUS BLD VENIPUNCTURE: CPT | Performed by: INTERNAL MEDICINE

## 2022-08-26 PROCEDURE — 80048 BASIC METABOLIC PNL TOTAL CA: CPT | Performed by: INTERNAL MEDICINE

## 2022-08-26 PROCEDURE — S0073 INJECTION, AZTREONAM, 500 MG: HCPCS | Performed by: FAMILY MEDICINE

## 2022-08-26 PROCEDURE — 25000003 PHARM REV CODE 250: Performed by: NURSE PRACTITIONER

## 2022-08-26 RX ORDER — MIDODRINE HYDROCHLORIDE 2.5 MG/1
2.5 TABLET ORAL 3 TIMES DAILY
Status: DISCONTINUED | OUTPATIENT
Start: 2022-08-26 | End: 2022-08-26

## 2022-08-26 RX ORDER — HYDROCODONE BITARTRATE AND ACETAMINOPHEN 10; 325 MG/1; MG/1
1 TABLET ORAL EVERY 6 HOURS PRN
Status: DISCONTINUED | OUTPATIENT
Start: 2022-08-26 | End: 2022-08-27 | Stop reason: HOSPADM

## 2022-08-26 RX ORDER — MIDODRINE HYDROCHLORIDE 5 MG/1
5 TABLET ORAL 3 TIMES DAILY
Status: DISCONTINUED | OUTPATIENT
Start: 2022-08-26 | End: 2022-08-27

## 2022-08-26 RX ADMIN — INSULIN ASPART 3 UNITS: 100 INJECTION, SOLUTION INTRAVENOUS; SUBCUTANEOUS at 06:08

## 2022-08-26 RX ADMIN — MIDODRINE HYDROCHLORIDE 5 MG: 5 TABLET ORAL at 08:08

## 2022-08-26 RX ADMIN — Medication 5000 UNITS: at 09:08

## 2022-08-26 RX ADMIN — VANCOMYCIN HYDROCHLORIDE 1500 MG: 1 INJECTION, POWDER, LYOPHILIZED, FOR SOLUTION INTRAVENOUS at 03:08

## 2022-08-26 RX ADMIN — ESCITALOPRAM OXALATE 10 MG: 10 TABLET ORAL at 08:08

## 2022-08-26 RX ADMIN — PANTOPRAZOLE SODIUM 40 MG: 40 TABLET, DELAYED RELEASE ORAL at 09:08

## 2022-08-26 RX ADMIN — APIXABAN 2.5 MG: 2.5 TABLET, FILM COATED ORAL at 08:08

## 2022-08-26 RX ADMIN — METRONIDAZOLE 500 MG: 500 INJECTION, SOLUTION INTRAVENOUS at 04:08

## 2022-08-26 RX ADMIN — MUPIROCIN: 20 OINTMENT TOPICAL at 09:08

## 2022-08-26 RX ADMIN — MIDODRINE HYDROCHLORIDE 2.5 MG: 2.5 TABLET ORAL at 02:08

## 2022-08-26 RX ADMIN — MELATONIN 3 MG: at 08:08

## 2022-08-26 RX ADMIN — GABAPENTIN 200 MG: 100 CAPSULE ORAL at 08:08

## 2022-08-26 RX ADMIN — HYDROCODONE BITARTRATE AND ACETAMINOPHEN 1 TABLET: 10; 325 TABLET ORAL at 02:08

## 2022-08-26 RX ADMIN — MEGESTROL ACETATE 40 MG: 40 TABLET ORAL at 06:08

## 2022-08-26 RX ADMIN — MUPIROCIN: 20 OINTMENT TOPICAL at 08:08

## 2022-08-26 RX ADMIN — FOLIC ACID 1000 MCG: 1 TABLET ORAL at 06:08

## 2022-08-26 RX ADMIN — ALLOPURINOL 100 MG: 100 TABLET ORAL at 09:08

## 2022-08-26 RX ADMIN — AZTREONAM 1000 MG: 1 INJECTION, POWDER, LYOPHILIZED, FOR SOLUTION INTRAMUSCULAR; INTRAVENOUS at 09:08

## 2022-08-26 RX ADMIN — LEVOTHYROXINE SODIUM 100 MCG: 100 TABLET ORAL at 06:08

## 2022-08-26 RX ADMIN — ASPIRIN 81 MG: 81 TABLET, COATED ORAL at 09:08

## 2022-08-26 RX ADMIN — HYDROCODONE BITARTRATE AND ACETAMINOPHEN 1 TABLET: 7.5; 325 TABLET ORAL at 09:08

## 2022-08-26 RX ADMIN — ATORVASTATIN CALCIUM 40 MG: 40 TABLET, FILM COATED ORAL at 08:08

## 2022-08-26 NOTE — PROGRESS NOTES
[facility]  Nephrology  Progress Note    Patient Name: Michelle Nunes  MRN: [id number]  Admission Date: 8/24/2022  Hospital Length of Stay: 2 days  Attending Provider: Baldemar Burk MD   Primary Care Physician: Primary Doctor No  Principal Problem:Infection of amputation stump    Subjective:     HPI: This patient with multiple medical problems that include, afib, PVD, DM, HTN, ESRD who is s/p left lower extremity ampuation and a prolonged hospitalization presented with increased weakness and unable to care for herself at home.  She presented with right lower leg pain.  She is noted to have wound on the right heel and the sacral area.  She has been admitted for wound care and broad spectrum antibiotics.  Nephrology has been consulted for renal issues.  The patient typically dialyzes on a TTS schedule.      Interval History: The patient is doing acceptable.    Review of patient's allergies indicates:   Allergen Reactions    Milk containing products     Pcn [penicillins]      Current Facility-Administered Medications   Medication Frequency    0.9%  NaCl infusion PRN    acetaminophen tablet 1,000 mg Q6H PRN    allopurinoL tablet 100 mg Daily    apixaban tablet 2.5 mg BID    aspirin EC tablet 81 mg Daily    atorvastatin tablet 40 mg QHS    aztreonam (AZACTAM) 1,000 mg in dextrose 5 % in water (D5W) 5 % 50 mL IVPB (MB+) Daily    bisacodyL EC tablet 10 mg Daily PRN    cholecalciferol (vitamin D3) 125 mcg (5,000 unit) tablet 5,000 Units Daily    collagenase ointment Daily    dextromethorphan-guaiFENesin  mg/5 ml liquid 10 mL Q6H PRN    dextrose 50% injection 12.5 g PRN    dextrose 50% injection 25 g PRN    EScitalopram oxalate tablet 10 mg QHS    folic acid tablet 1,000 mcg QAM    gabapentin capsule 200 mg QHS    glucagon (human recombinant) injection 1 mg PRN    heparin (porcine) injection 4,000 Units PRN    HYDROcodone-acetaminophen  mg per tablet 1 tablet Q6H  PRN    insulin aspart U-100 injection 0-5 Units QID (AC + HS) PRN    insulin aspart U-100 injection 3 Units TIDWM    insulin detemir U-100 injection 10 Units QHS    levothyroxine tablet 100 mcg QAM    megestroL tablet 40 mg QAM    melatonin tablet 3 mg QHS    midodrine tablet 5 mg TID    mupirocin 2 % ointment BID    ondansetron injection 8 mg Q6H PRN    pantoprazole EC tablet 40 mg Daily    simethicone chewable tablet 80 mg TID PRN    sodium chloride 0.9% flush 10 mL Q12H PRN    traZODone tablet 50 mg Nightly PRN    vancomycin - pharmacy to dose pharmacy to manage frequency       Objective:     Vital Signs (Most Recent):  Temp: 98.1 °F (36.7 °C) (08/26/22 1514)  Pulse: 83 (08/26/22 1730)  Resp: 13 (08/26/22 1730)  BP: (!) 115/52 (08/26/22 1515)  SpO2: 100 % (08/26/22 1645)  O2 Device (Oxygen Therapy): nasal cannula (08/25/22 1945)   Vital Signs (24h Range):  Temp:  [97.8 °F (36.6 °C)-98.2 °F (36.8 °C)] 98.1 °F (36.7 °C)  Pulse:  [] 83  Resp:  [9-22] 13  SpO2:  [81 %-100 %] 100 %  BP: ()/() 115/52     Weight: 118.2 kg (260 lb 9.3 oz) (This is what the bed is reading at thist time.) (08/26/22 0545)  Body mass index is 47.66 kg/m².  Body surface area is 2.27 meters squared.    I/O last 3 completed shifts:  In: 466.2 [P.O.:360; I.V.:106.2]  Out: 2000 [Other:2000]    Physical Exam  Vitals reviewed.   HENT:      Head: Atraumatic.      Mouth/Throat:      Mouth: Mucous membranes are moist.   Cardiovascular:      Rate and Rhythm: Regular rhythm.   Pulmonary:      Effort: Pulmonary effort is normal.   Abdominal:      Palpations: Abdomen is soft.   Neurological:      Mental Status: She is alert.       Significant Labs:  BMP:   Recent Labs   Lab 08/26/22  0835      *   K 3.8   CL 98   CO2 26   BUN 19*   CREATININE 5.82*   CALCIUM 8.7     CBC:   Recent Labs   Lab 08/26/22  0835   WBC 16.91*   RBC 3.03*   HGB 9.3*   HCT 30.0*      MCV 99.0*   MCH 30.7   MCHC 31.0*         Significant Imaging:  Labs: Reviewed    Assessment/Plan:     No new Assessment & Plan notes have been filed under this hospital service since the last note was generated.  Service: Nephrology    Continue with schedule.  Thank you for your consult. I will follow-up with patient. Please contact us if you have any additional questions.    Jef Romero Jr, MD  Nephrology  [facility]

## 2022-08-26 NOTE — PLAN OF CARE
Spoke with chevy np and pt will benefit from ltac, choice from dtr for specialty, referral called to [facility] in specialty, pt meets criteria, plan will be to dc to specialty tomorrow once blood pressure stable, following

## 2022-08-26 NOTE — PROGRESS NOTES
Vancomycin level resulted 9.6. Ok to re-dose patient but no IV access at this time. Will give another dose once access obtained or during dialysis on Saturday.

## 2022-08-26 NOTE — SUBJECTIVE & OBJECTIVE
Interval History:  Patient without complaints    Objective:     Vital Signs (Most Recent):  Temp: 98 °F (36.7 °C) (08/26/22 0345)  Pulse: 101 (08/26/22 0445)  Resp: (!) 9 (08/26/22 0445)  BP: (!) 169/82 (08/26/22 0445)  SpO2: 99 % (08/26/22 0445)   Vital Signs (24h Range):  Temp:  [97.8 °F (36.6 °C)-98.3 °F (36.8 °C)] 98 °F (36.7 °C)  Pulse:  [] 101  Resp:  [9-22] 9  SpO2:  [84 %-100 %] 99 %  BP: ()/() 169/82     Weight: 118.2 kg (260 lb 9.3 oz) (This is what the bed is reading at thist time.)  Body mass index is 47.66 kg/m².      Intake/Output Summary (Last 24 hours) at 8/26/2022 0555  Last data filed at 8/26/2022 0545  Gross per 24 hour   Intake 445.23 ml   Output 2000 ml   Net -1554.77 ml       Physical Exam  Vitals reviewed.   Constitutional:       Appearance: Normal appearance.      Interventions: She is not intubated.  HENT:      Head: Normocephalic and atraumatic.      Nose: Nose normal.      Mouth/Throat:      Mouth: Mucous membranes are dry.      Pharynx: Oropharynx is clear.   Eyes:      Extraocular Movements: Extraocular movements intact.      Conjunctiva/sclera: Conjunctivae normal.      Pupils: Pupils are equal, round, and reactive to light.   Cardiovascular:      Rate and Rhythm: Normal rate.      Heart sounds: Normal heart sounds. No murmur heard.  Pulmonary:      Effort: Pulmonary effort is normal. She is not intubated.      Breath sounds: Normal breath sounds.   Abdominal:      General: Abdomen is flat. Bowel sounds are normal.      Palpations: Abdomen is soft.   Musculoskeletal:         General: Normal range of motion.      Cervical back: Normal range of motion and neck supple.      Right lower leg: No edema.      Left lower leg: No edema.   Skin:     General: Skin is warm and dry.      Capillary Refill: Capillary refill takes less than 2 seconds.   Neurological:      General: No focal deficit present.      Mental Status: She is alert and oriented to person, place, and time.    Psychiatric:         Mood and Affect: Mood normal.         Behavior: Behavior normal.       Vents:  Oxygen Concentration (%): 28 (08/25/22 1700)    Lines/Drains/Airways       Central Venous Catheter Line  Duration                  Hemodialysis Catheter left subclavian -- days              Peripheral Intravenous Line  Duration                  Peripheral IV - Single Lumen 08/24/22 2130 20 G Right Shoulder 1 day                    Significant Labs:    CBC/Anemia Profile:  Recent Labs   Lab 08/24/22 1944 08/25/22  0639   WBC 15.02* 14.84*   HGB 9.3* 7.3*   HCT 28.4* 23.6*    203   MCV 95.3 99.6*   RDW 18.3* 18.6*        Chemistries:  Recent Labs   Lab 08/24/22 1944 08/25/22  0838   * 134*   K 3.7 4.5   CL 95* 98   CO2 25 25   BUN 28* 30*   CREATININE 7.46* 7.87*   CALCIUM 9.1 8.4*   ALBUMIN 2.4*  --    PROT 6.0*  --    BILITOT 0.8  --    ALKPHOS 70  --    ALT 11*  --    AST 9*  --        Recent Lab Results  (Last 5 results in the past 24 hours)        08/25/22 2139 08/25/22 2028   08/25/22  1848   08/25/22  1635   08/25/22  0838        Anion Gap         16       BUN         30       BUN/CREAT RATIO         4       Calcium         8.4       Chloride         98       CO2         25       Creatinine         7.87       eGFR         5       Glucose         157       POC Glucose 147     140   97         Potassium         4.5       Sodium         134       Vancomycin, Random   9.6                                    Significant Imaging:  I have reviewed all pertinent imaging results/findings within the past 24 hours.

## 2022-08-26 NOTE — PROGRESS NOTES
[facility]  Pulmonology  Progress Note    Patient Name: Michelle Nunes  MRN: [id number]  Admission Date: 8/24/2022  Hospital Length of Stay: 2 days  Code Status: Full Code  Attending Provider: Baldemar Burk MD  Primary Care Provider: Primary Doctor No   Principal Problem: Infection of amputation stump    Subjective:     Interval History:  Patient without complaints    Objective:     Vital Signs (Most Recent):  Temp: 98 °F (36.7 °C) (08/26/22 0345)  Pulse: 101 (08/26/22 0445)  Resp: (!) 9 (08/26/22 0445)  BP: (!) 169/82 (08/26/22 0445)  SpO2: 99 % (08/26/22 0445)   Vital Signs (24h Range):  Temp:  [97.8 °F (36.6 °C)-98.3 °F (36.8 °C)] 98 °F (36.7 °C)  Pulse:  [] 101  Resp:  [9-22] 9  SpO2:  [84 %-100 %] 99 %  BP: ()/() 169/82     Weight: 118.2 kg (260 lb 9.3 oz) (This is what the bed is reading at thist time.)  Body mass index is 47.66 kg/m².      Intake/Output Summary (Last 24 hours) at 8/26/2022 0555  Last data filed at 8/26/2022 0545  Gross per 24 hour   Intake 445.23 ml   Output 2000 ml   Net -1554.77 ml       Physical Exam  Vitals reviewed.   Constitutional:       Appearance: Normal appearance.      Interventions: She is not intubated.  HENT:      Head: Normocephalic and atraumatic.      Nose: Nose normal.      Mouth/Throat:      Mouth: Mucous membranes are dry.      Pharynx: Oropharynx is clear.   Eyes:      Extraocular Movements: Extraocular movements intact.      Conjunctiva/sclera: Conjunctivae normal.      Pupils: Pupils are equal, round, and reactive to light.   Cardiovascular:      Rate and Rhythm: Normal rate.      Heart sounds: Normal heart sounds. No murmur heard.  Pulmonary:      Effort: Pulmonary effort is normal. She is not intubated.      Breath sounds: Normal breath sounds.   Abdominal:      General: Abdomen is flat. Bowel sounds are normal.      Palpations: Abdomen is soft.   Musculoskeletal:         General: Normal range of motion.      Cervical  back: Normal range of motion and neck supple.      Right lower leg: No edema.      Left lower leg: No edema.   Skin:     General: Skin is warm and dry.      Capillary Refill: Capillary refill takes less than 2 seconds.   Neurological:      General: No focal deficit present.      Mental Status: She is alert and oriented to person, place, and time.   Psychiatric:         Mood and Affect: Mood normal.         Behavior: Behavior normal.       Vents:  Oxygen Concentration (%): 28 (08/25/22 1700)    Lines/Drains/Airways       Central Venous Catheter Line  Duration                  Hemodialysis Catheter left subclavian -- days              Peripheral Intravenous Line  Duration                  Peripheral IV - Single Lumen 08/24/22 2130 20 G Right Shoulder 1 day                    Significant Labs:    CBC/Anemia Profile:  Recent Labs   Lab 08/24/22 1944 08/25/22  0639   WBC 15.02* 14.84*   HGB 9.3* 7.3*   HCT 28.4* 23.6*    203   MCV 95.3 99.6*   RDW 18.3* 18.6*        Chemistries:  Recent Labs   Lab 08/24/22 1944 08/25/22  0838   * 134*   K 3.7 4.5   CL 95* 98   CO2 25 25   BUN 28* 30*   CREATININE 7.46* 7.87*   CALCIUM 9.1 8.4*   ALBUMIN 2.4*  --    PROT 6.0*  --    BILITOT 0.8  --    ALKPHOS 70  --    ALT 11*  --    AST 9*  --        Recent Lab Results  (Last 5 results in the past 24 hours)        08/25/22 2139 08/25/22 2028   08/25/22  1848   08/25/22  1635   08/25/22  0838        Anion Gap         16       BUN         30       BUN/CREAT RATIO         4       Calcium         8.4       Chloride         98       CO2         25       Creatinine         7.87       eGFR         5       Glucose         157       POC Glucose 147     140   97         Potassium         4.5       Sodium         134       Vancomycin, Random   9.6                                    Significant Imaging:  I have reviewed all pertinent imaging results/findings within the past 24 hours.    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 Mckeon dialysis Tuesday Thursday Saturday    Deep vein thrombosis (DVT) of lower extremity  Even though DVTs no longer present I would treat with anticoagulation for 3 months    Diabetic ulcer of right heel associated with type 2 diabetes mellitus  Being followed by wound care                 Baldemar Burk MD  Pulmonology  [facility]

## 2022-08-26 NOTE — HPI
This patient with multiple medical problems that include, afib, PVD, DM, HTN, ESRD who is s/p left lower extremity ampuation and a prolonged hospitalization presented with increased weakness and unable to care for herself at home.  She presented with right lower leg pain.  She is noted to have wound on the right heel and the sacral area.  She has been admitted for wound care and broad spectrum antibiotics.  Nephrology has been consulted for renal issues.  The patient typically dialyzes on a TTS schedule.

## 2022-08-26 NOTE — SUBJECTIVE & OBJECTIVE
Interval History: The patient is doing acceptable.    Review of patient's allergies indicates:   Allergen Reactions    Milk containing products     Pcn [penicillins]      Current Facility-Administered Medications   Medication Frequency    0.9%  NaCl infusion PRN    acetaminophen tablet 1,000 mg Q6H PRN    allopurinoL tablet 100 mg Daily    apixaban tablet 2.5 mg BID    aspirin EC tablet 81 mg Daily    atorvastatin tablet 40 mg QHS    aztreonam (AZACTAM) 1,000 mg in dextrose 5 % in water (D5W) 5 % 50 mL IVPB (MB+) Daily    bisacodyL EC tablet 10 mg Daily PRN    cholecalciferol (vitamin D3) 125 mcg (5,000 unit) tablet 5,000 Units Daily    collagenase ointment Daily    dextromethorphan-guaiFENesin  mg/5 ml liquid 10 mL Q6H PRN    dextrose 50% injection 12.5 g PRN    dextrose 50% injection 25 g PRN    EScitalopram oxalate tablet 10 mg QHS    folic acid tablet 1,000 mcg QAM    gabapentin capsule 200 mg QHS    glucagon (human recombinant) injection 1 mg PRN    heparin (porcine) injection 4,000 Units PRN    HYDROcodone-acetaminophen  mg per tablet 1 tablet Q6H PRN    insulin aspart U-100 injection 0-5 Units QID (AC + HS) PRN    insulin aspart U-100 injection 3 Units TIDWM    insulin detemir U-100 injection 10 Units QHS    levothyroxine tablet 100 mcg QAM    megestroL tablet 40 mg QAM    melatonin tablet 3 mg QHS    midodrine tablet 5 mg TID    mupirocin 2 % ointment BID    ondansetron injection 8 mg Q6H PRN    pantoprazole EC tablet 40 mg Daily    simethicone chewable tablet 80 mg TID PRN    sodium chloride 0.9% flush 10 mL Q12H PRN    traZODone tablet 50 mg Nightly PRN    vancomycin - pharmacy to dose pharmacy to manage frequency       Objective:     Vital Signs (Most Recent):  Temp: 98.1 °F (36.7 °C) (08/26/22 1514)  Pulse: 83 (08/26/22 1730)  Resp: 13 (08/26/22 1730)  BP: (!) 115/52 (08/26/22 1515)  SpO2: 100 % (08/26/22 1645)  O2 Device (Oxygen Therapy): nasal cannula (08/25/22 1945)   Vital Signs (24h  Range):  Temp:  [97.8 °F (36.6 °C)-98.2 °F (36.8 °C)] 98.1 °F (36.7 °C)  Pulse:  [] 83  Resp:  [9-22] 13  SpO2:  [81 %-100 %] 100 %  BP: ()/() 115/52     Weight: 118.2 kg (260 lb 9.3 oz) (This is what the bed is reading at thist time.) (08/26/22 0545)  Body mass index is 47.66 kg/m².  Body surface area is 2.27 meters squared.    I/O last 3 completed shifts:  In: 466.2 [P.O.:360; I.V.:106.2]  Out: 2000 [Other:2000]    Physical Exam  Vitals reviewed.   HENT:      Head: Atraumatic.      Mouth/Throat:      Mouth: Mucous membranes are moist.   Cardiovascular:      Rate and Rhythm: Regular rhythm.   Pulmonary:      Effort: Pulmonary effort is normal.   Abdominal:      Palpations: Abdomen is soft.   Neurological:      Mental Status: She is alert.       Significant Labs:  BMP:   Recent Labs   Lab 08/26/22  0835      *   K 3.8   CL 98   CO2 26   BUN 19*   CREATININE 5.82*   CALCIUM 8.7     CBC:   Recent Labs   Lab 08/26/22  0835   WBC 16.91*   RBC 3.03*   HGB 9.3*   HCT 30.0*      MCV 99.0*   MCH 30.7   MCHC 31.0*        Significant Imaging:  Labs: Reviewed

## 2022-08-26 NOTE — PLAN OF CARE
Chart reviewed, cont to treat hypotension, on iv abx for infectionof stump wound, cont HD as scheduled TTS, notified chevy logan that pt will need tb test for snf placement, referral pending to [facility]

## 2022-08-26 NOTE — PROGRESS NOTES
IV access obtained.    Will give vancomycin 1500mg dose this morning and check a random level Sunday morning.    Brianna Haley, PharmD  [id number]

## 2022-08-26 NOTE — NURSING
Verigene result to gram + cocci blood culture test in one set in the aerobic bottle noted to be MRSE. Eli Valdes, NP notified and stated that it is likely contaminate and we will continue to monitor. She is already receiving vancomycin and aztreonam.

## 2022-08-26 NOTE — SUBJECTIVE & OBJECTIVE
Past Medical History:   Diagnosis Date    A-fib     Blind right eye     Diabetes mellitus     ESRD (end stage renal disease) on dialysis     GERD (gastroesophageal reflux disease)     Gout, unspecified     Heart attack     HTN (hypertension)     Kidney failure     Neuropathy        Past Surgical History:   Procedure Laterality Date    CHOLECYSTECTOMY      HYSTERECTOMY      LEG AMPUTATION THROUGH KNEE Left        Review of patient's allergies indicates:   Allergen Reactions    Milk containing products     Pcn [penicillins]      Current Facility-Administered Medications   Medication Frequency    0.9%  NaCl infusion PRN    acetaminophen tablet 1,000 mg Q6H PRN    allopurinoL tablet 100 mg Daily    aspirin EC tablet 81 mg Daily    atorvastatin tablet 40 mg QHS    aztreonam (AZACTAM) 1,000 mg in dextrose 5 % in water (D5W) 5 % 50 mL IVPB (MB+) Daily    bisacodyL EC tablet 10 mg Daily PRN    cholecalciferol (vitamin D3) 125 mcg (5,000 unit) tablet 5,000 Units Daily    [START ON 8/26/2022] collagenase ointment Daily    dextromethorphan-guaiFENesin  mg/5 ml liquid 10 mL Q6H PRN    dextrose 50% injection 12.5 g PRN    dextrose 50% injection 25 g PRN    EScitalopram oxalate tablet 10 mg QHS    folic acid tablet 1,000 mcg QAM    gabapentin capsule 200 mg QHS    glucagon (human recombinant) injection 1 mg PRN    heparin (porcine) injection 4,000 Units PRN    HYDROcodone-acetaminophen 7.5-325 mg per tablet 1 tablet Q6H PRN    insulin aspart U-100 injection 0-5 Units QID (AC + HS) PRN    insulin aspart U-100 injection 3 Units TIDWM    insulin detemir U-100 injection 10 Units QHS    levothyroxine tablet 100 mcg QAM    megestroL tablet 40 mg QAM    melatonin tablet 3 mg QHS    metronidazole IVPB 500 mg Q8H    morphine injection 2 mg Once    mupirocin 2 % ointment BID    NORepinephrine 4 mg in dextrose 5 % 250 mL infusion Continuous    ondansetron injection 8 mg Q6H PRN    pantoprazole EC tablet 40 mg Daily    simethicone  chewable tablet 80 mg TID PRN    sodium chloride 0.9% flush 10 mL Q12H PRN    traZODone tablet 50 mg Nightly PRN    vancomycin - pharmacy to dose pharmacy to manage frequency     Family History       Problem Relation (Age of Onset)    Cancer Brother    Cirrhosis Mother    Colon cancer Brother    Diabetes Father    Hypertension Father          Tobacco Use    Smoking status: Never Smoker    Smokeless tobacco: Never Used   Substance and Sexual Activity    Alcohol use: Never    Drug use: Never    Sexual activity: Not on file     Review of Systems   All other systems reviewed and are negative.  Objective:     Vital Signs (Most Recent):  Temp: 98 °F (36.7 °C) (08/25/22 1706)  Pulse: 104 (08/25/22 1845)  Resp: 12 (08/25/22 1845)  BP: (!) 109/51 (08/25/22 1845)  SpO2: 97 % (08/25/22 1845)  O2 Device (Oxygen Therapy): nasal cannula (08/25/22 1700)   Vital Signs (24h Range):  Temp:  [97.8 °F (36.6 °C)-98.8 °F (37.1 °C)] 98 °F (36.7 °C)  Pulse:  [] 104  Resp:  [9-21] 12  SpO2:  [86 %-100 %] 97 %  BP: ()/() 109/51     Weight: (!) 139.7 kg (308 lb) (08/25/22 0300)  Body mass index is 56.33 kg/m².  Body surface area is 2.47 meters squared.    I/O last 3 completed shifts:  In: 8.4 [I.V.:8.4]  Out: 2000 [Other:2000]    Physical Exam  Vitals reviewed.   Constitutional:       Appearance: She is obese.   HENT:      Head: Normocephalic and atraumatic.      Mouth/Throat:      Mouth: Mucous membranes are moist.   Eyes:      Comments: The patient is blind in right eye   Cardiovascular:      Rate and Rhythm: Rhythm irregular.   Pulmonary:      Effort: Pulmonary effort is normal.   Abdominal:      Palpations: Abdomen is soft.   Skin:     Comments: Wounds noted on photo documentation   Neurological:      Mental Status: She is alert.   Psychiatric:         Mood and Affect: Mood normal.       Significant Labs:  BMP:   Recent Labs   Lab 08/25/22  0838   *   *   K 4.5   CL 98   CO2 25   BUN 30*   CREATININE 7.87*    CALCIUM 8.4*     CBC:   Recent Labs   Lab 08/25/22  0639   WBC 14.84*   RBC 2.37*   HGB 7.3*   HCT 23.6*      MCV 99.6*   MCH 30.8   MCHC 30.9*       Significant Imaging:  Labs: Reviewed

## 2022-08-26 NOTE — NURSING
We are unable to obtain a second IV line after multiple IV attempts from several different staff members. I will speak with the MD in the am about possibly getting a line placed for iv access. Pt. Does have a working IV at this time.

## 2022-08-26 NOTE — CONSULTS
[facility]  Nephrology  Consult Note    Patient Name: Michelle Nunes  MRN: [id number]  Admission Date: 8/24/2022  Hospital Length of Stay: 1 days  Attending Provider: Jose Luis Espinosa MD   Primary Care Physician: Primary Doctor No  Principal Problem:Infection of amputation stump    Consults  Subjective:     HPI: This patient with multiple medical problems that include, afib, PVD, DM, HTN, ESRD who is s/p left lower extremity ampuation and a prolonged hospitalization presented with increased weakness and unable to care for herself at home.  She presented with right lower leg pain.  She is noted to have wound on the right heel and the sacral area.  She has been admitted for wound care and broad spectrum antibiotics.  Nephrology has been consulted for renal issues.  The patient typically dialyzes on a TTS schedule.      Past Medical History:   Diagnosis Date    A-fib     Blind right eye     Diabetes mellitus     ESRD (end stage renal disease) on dialysis     GERD (gastroesophageal reflux disease)     Gout, unspecified     Heart attack     HTN (hypertension)     Kidney failure     Neuropathy        Past Surgical History:   Procedure Laterality Date    CHOLECYSTECTOMY      HYSTERECTOMY      LEG AMPUTATION THROUGH KNEE Left        Review of patient's allergies indicates:   Allergen Reactions    Milk containing products     Pcn [penicillins]      Current Facility-Administered Medications   Medication Frequency    0.9%  NaCl infusion PRN    acetaminophen tablet 1,000 mg Q6H PRN    allopurinoL tablet 100 mg Daily    aspirin EC tablet 81 mg Daily    atorvastatin tablet 40 mg QHS    aztreonam (AZACTAM) 1,000 mg in dextrose 5 % in water (D5W) 5 % 50 mL IVPB (MB+) Daily    bisacodyL EC tablet 10 mg Daily PRN    cholecalciferol (vitamin D3) 125 mcg (5,000 unit) tablet 5,000 Units Daily    [START ON 8/26/2022] collagenase ointment Daily    dextromethorphan-guaiFENesin  mg/5 ml  liquid 10 mL Q6H PRN    dextrose 50% injection 12.5 g PRN    dextrose 50% injection 25 g PRN    EScitalopram oxalate tablet 10 mg QHS    folic acid tablet 1,000 mcg QAM    gabapentin capsule 200 mg QHS    glucagon (human recombinant) injection 1 mg PRN    heparin (porcine) injection 4,000 Units PRN    HYDROcodone-acetaminophen 7.5-325 mg per tablet 1 tablet Q6H PRN    insulin aspart U-100 injection 0-5 Units QID (AC + HS) PRN    insulin aspart U-100 injection 3 Units TIDWM    insulin detemir U-100 injection 10 Units QHS    levothyroxine tablet 100 mcg QAM    megestroL tablet 40 mg QAM    melatonin tablet 3 mg QHS    metronidazole IVPB 500 mg Q8H    morphine injection 2 mg Once    mupirocin 2 % ointment BID    NORepinephrine 4 mg in dextrose 5 % 250 mL infusion Continuous    ondansetron injection 8 mg Q6H PRN    pantoprazole EC tablet 40 mg Daily    simethicone chewable tablet 80 mg TID PRN    sodium chloride 0.9% flush 10 mL Q12H PRN    traZODone tablet 50 mg Nightly PRN    vancomycin - pharmacy to dose pharmacy to manage frequency     Family History       Problem Relation (Age of Onset)    Cancer Brother    Cirrhosis Mother    Colon cancer Brother    Diabetes Father    Hypertension Father          Tobacco Use    Smoking status: Never Smoker    Smokeless tobacco: Never Used   Substance and Sexual Activity    Alcohol use: Never    Drug use: Never    Sexual activity: Not on file     Review of Systems   All other systems reviewed and are negative.  Objective:     Vital Signs (Most Recent):  Temp: 98 °F (36.7 °C) (08/25/22 1706)  Pulse: 104 (08/25/22 1845)  Resp: 12 (08/25/22 1845)  BP: (!) 109/51 (08/25/22 1845)  SpO2: 97 % (08/25/22 1845)  O2 Device (Oxygen Therapy): nasal cannula (08/25/22 1700)   Vital Signs (24h Range):  Temp:  [97.8 °F (36.6 °C)-98.8 °F (37.1 °C)] 98 °F (36.7 °C)  Pulse:  [] 104  Resp:  [9-21] 12  SpO2:  [86 %-100 %] 97 %  BP: ()/() 109/51     Weight:  (!) 139.7 kg (308 lb) (08/25/22 0300)  Body mass index is 56.33 kg/m².  Body surface area is 2.47 meters squared.    I/O last 3 completed shifts:  In: 8.4 [I.V.:8.4]  Out: 2000 [Other:2000]    Physical Exam  Vitals reviewed.   Constitutional:       Appearance: She is obese.   HENT:      Head: Normocephalic and atraumatic.      Mouth/Throat:      Mouth: Mucous membranes are moist.   Eyes:      Comments: The patient is blind in right eye   Cardiovascular:      Rate and Rhythm: Rhythm irregular.   Pulmonary:      Effort: Pulmonary effort is normal.   Abdominal:      Palpations: Abdomen is soft.   Skin:     Comments: Wounds noted on photo documentation   Neurological:      Mental Status: She is alert.   Psychiatric:         Mood and Affect: Mood normal.       Significant Labs:  BMP:   Recent Labs   Lab 08/25/22  0838   *   *   K 4.5   CL 98   CO2 25   BUN 30*   CREATININE 7.87*   CALCIUM 8.4*     CBC:   Recent Labs   Lab 08/25/22  0639   WBC 14.84*   RBC 2.37*   HGB 7.3*   HCT 23.6*      MCV 99.6*   MCH 30.8   MCHC 30.9*       Significant Imaging:  Labs: Reviewed    Assessment/Plan:     Atrial fibrillation, controlled  Chronic condition    Type 2 diabetes mellitus  Chronic condition    ESRD on dialysis  Continue with scheduled hemodialysis        Thank you for your consult. I will follow-up with patient. Please contact us if you have any additional questions.    Jef Romero Jr, MD  Nephrology  [facility]

## 2022-08-27 ENCOUNTER — HOSPITAL ENCOUNTER (INPATIENT)
Facility: HOSPITAL | Age: 67
LOS: 18 days | Discharge: HOME HEALTH | DRG: 463 | End: 2022-09-14
Attending: FAMILY MEDICINE | Admitting: FAMILY MEDICINE
Payer: MEDICARE

## 2022-08-27 VITALS
HEART RATE: 110 BPM | DIASTOLIC BLOOD PRESSURE: 68 MMHG | WEIGHT: 267.63 LBS | RESPIRATION RATE: 10 BRPM | SYSTOLIC BLOOD PRESSURE: 130 MMHG | OXYGEN SATURATION: 100 % | HEIGHT: 62 IN | BODY MASS INDEX: 49.25 KG/M2 | TEMPERATURE: 98 F

## 2022-08-27 DIAGNOSIS — L89.310 PRESSURE INJURY OF RIGHT BUTTOCK, UNSTAGEABLE: ICD-10-CM

## 2022-08-27 DIAGNOSIS — E11.59 TYPE 2 DIABETES MELLITUS WITH OTHER CIRCULATORY COMPLICATION, UNSPECIFIED WHETHER LONG TERM INSULIN USE: ICD-10-CM

## 2022-08-27 DIAGNOSIS — E11.621 DIABETIC ULCER OF RIGHT HEEL ASSOCIATED WITH TYPE 2 DIABETES MELLITUS, UNSPECIFIED ULCER STAGE: ICD-10-CM

## 2022-08-27 DIAGNOSIS — E66.01 MORBID OBESITY WITH BMI OF 50.0-59.9, ADULT: ICD-10-CM

## 2022-08-27 DIAGNOSIS — Z99.2 ESRD ON DIALYSIS: ICD-10-CM

## 2022-08-27 DIAGNOSIS — N18.6 ESRD ON DIALYSIS: ICD-10-CM

## 2022-08-27 DIAGNOSIS — L97.419 DIABETIC ULCER OF RIGHT HEEL ASSOCIATED WITH TYPE 2 DIABETES MELLITUS, UNSPECIFIED ULCER STAGE: ICD-10-CM

## 2022-08-27 DIAGNOSIS — I95.9 HYPOTENSION: ICD-10-CM

## 2022-08-27 DIAGNOSIS — T87.40 INFECTION OF AMPUTATION STUMP: Primary | ICD-10-CM

## 2022-08-27 DIAGNOSIS — L97.413 DIABETIC ULCER OF RIGHT HEEL ASSOCIATED WITH TYPE 2 DIABETES MELLITUS, WITH NECROSIS OF MUSCLE: ICD-10-CM

## 2022-08-27 DIAGNOSIS — J96.02 ACUTE HYPERCAPNIC RESPIRATORY FAILURE: ICD-10-CM

## 2022-08-27 DIAGNOSIS — L97.125 NON-PRESSURE CHRONIC ULCER OF LEFT THIGH WITH MUSCLE INVOLVEMENT WITHOUT EVIDENCE OF NECROSIS: ICD-10-CM

## 2022-08-27 DIAGNOSIS — E11.621 DIABETIC ULCER OF RIGHT HEEL ASSOCIATED WITH TYPE 2 DIABETES MELLITUS, WITH NECROSIS OF MUSCLE: ICD-10-CM

## 2022-08-27 PROBLEM — I73.9 PVD (PERIPHERAL VASCULAR DISEASE): Status: ACTIVE | Noted: 2022-08-27

## 2022-08-27 PROBLEM — R06.81 APNEA: Status: ACTIVE | Noted: 2022-08-27

## 2022-08-27 LAB
ALBUMIN SERPL BCP-MCNC: 1.9 G/DL (ref 3.5–5)
ALBUMIN/GLOB SERPL: 0.5 {RATIO}
ALP SERPL-CCNC: 76 U/L (ref 55–142)
ALT SERPL W P-5'-P-CCNC: 6 U/L (ref 13–56)
ANION GAP SERPL CALCULATED.3IONS-SCNC: 13 MMOL/L (ref 7–16)
AST SERPL W P-5'-P-CCNC: 6 U/L (ref 15–37)
BACTERIA SPEC ANAEROBE CULT: NORMAL
BASOPHILS # BLD AUTO: 0.05 K/UL (ref 0–0.2)
BASOPHILS NFR BLD AUTO: 0.3 % (ref 0–1)
BILIRUB SERPL-MCNC: 0.4 MG/DL (ref ?–1.2)
BUN SERPL-MCNC: 25 MG/DL (ref 7–18)
BUN/CREAT SERPL: 4 (ref 6–20)
CALCIUM SERPL-MCNC: 9 MG/DL (ref 8.5–10.1)
CHLORIDE SERPL-SCNC: 98 MMOL/L (ref 98–107)
CO2 SERPL-SCNC: 26 MMOL/L (ref 21–32)
CREAT SERPL-MCNC: 6.65 MG/DL (ref 0.55–1.02)
DIFFERENTIAL METHOD BLD: ABNORMAL
EGFR (NO RACE VARIABLE) (RUSH/TITUS): 6 ML/MIN/1.73M²
EOSINOPHIL # BLD AUTO: 0.11 K/UL (ref 0–0.5)
EOSINOPHIL NFR BLD AUTO: 0.7 % (ref 1–4)
ERYTHROCYTE [DISTWIDTH] IN BLOOD BY AUTOMATED COUNT: 17.7 % (ref 11.5–14.5)
GLOBULIN SER-MCNC: 4 G/DL (ref 2–4)
GLUCOSE SERPL-MCNC: 135 MG/DL (ref 70–105)
GLUCOSE SERPL-MCNC: 145 MG/DL (ref 74–106)
GLUCOSE SERPL-MCNC: 181 MG/DL (ref 70–105)
GLUCOSE SERPL-MCNC: 99 MG/DL (ref 70–105)
HCT VFR BLD AUTO: 27.7 % (ref 38–47)
HGB BLD-MCNC: 8.6 G/DL (ref 12–16)
IMM GRANULOCYTES # BLD AUTO: 0.14 K/UL (ref 0–0.04)
IMM GRANULOCYTES NFR BLD: 0.9 % (ref 0–0.4)
LYMPHOCYTES # BLD AUTO: 1.13 K/UL (ref 1–4.8)
LYMPHOCYTES NFR BLD AUTO: 7.4 % (ref 27–41)
MAGNESIUM SERPL-MCNC: 1.7 MG/DL (ref 1.7–2.3)
MCH RBC QN AUTO: 30.8 PG (ref 27–31)
MCHC RBC AUTO-ENTMCNC: 31 G/DL (ref 32–36)
MCV RBC AUTO: 99.3 FL (ref 80–96)
MONOCYTES # BLD AUTO: 0.78 K/UL (ref 0–0.8)
MONOCYTES NFR BLD AUTO: 5.1 % (ref 2–6)
MPC BLD CALC-MCNC: 10.4 FL (ref 9.4–12.4)
NEUTROPHILS # BLD AUTO: 13.03 K/UL (ref 1.8–7.7)
NEUTROPHILS NFR BLD AUTO: 85.6 % (ref 53–65)
NRBC # BLD AUTO: 0 X10E3/UL
NRBC, AUTO (.00): 0 %
PLATELET # BLD AUTO: 211 K/UL (ref 150–400)
POTASSIUM SERPL-SCNC: 4.1 MMOL/L (ref 3.5–5.1)
PROT SERPL-MCNC: 5.9 G/DL (ref 6.4–8.2)
RBC # BLD AUTO: 2.79 M/UL (ref 4.2–5.4)
SODIUM SERPL-SCNC: 133 MMOL/L (ref 136–145)
WBC # BLD AUTO: 15.24 K/UL (ref 4.5–11)

## 2022-08-27 PROCEDURE — 63600175 PHARM REV CODE 636 W HCPCS: Performed by: INTERNAL MEDICINE

## 2022-08-27 PROCEDURE — 25000003 PHARM REV CODE 250: Performed by: NURSE PRACTITIONER

## 2022-08-27 PROCEDURE — 82962 GLUCOSE BLOOD TEST: CPT

## 2022-08-27 PROCEDURE — 25000003 PHARM REV CODE 250: Performed by: HOSPITALIST

## 2022-08-27 PROCEDURE — 83735 ASSAY OF MAGNESIUM: CPT | Performed by: INTERNAL MEDICINE

## 2022-08-27 PROCEDURE — 25000003 PHARM REV CODE 250: Performed by: FAMILY MEDICINE

## 2022-08-27 PROCEDURE — 80053 COMPREHEN METABOLIC PANEL: CPT | Performed by: INTERNAL MEDICINE

## 2022-08-27 PROCEDURE — 63600175 PHARM REV CODE 636 W HCPCS: Performed by: FAMILY MEDICINE

## 2022-08-27 PROCEDURE — 80100014 HC HEMODIALYSIS 1:1

## 2022-08-27 PROCEDURE — 11000008

## 2022-08-27 PROCEDURE — 85025 COMPLETE CBC W/AUTO DIFF WBC: CPT | Performed by: INTERNAL MEDICINE

## 2022-08-27 PROCEDURE — 36415 COLL VENOUS BLD VENIPUNCTURE: CPT | Performed by: INTERNAL MEDICINE

## 2022-08-27 PROCEDURE — P9047 ALBUMIN (HUMAN), 25%, 50ML: HCPCS | Performed by: FAMILY MEDICINE

## 2022-08-27 PROCEDURE — 99223 PR INITIAL HOSPITAL CARE,LEVL III: ICD-10-PCS | Mod: AI,,, | Performed by: FAMILY MEDICINE

## 2022-08-27 PROCEDURE — 25000003 PHARM REV CODE 250: Performed by: INTERNAL MEDICINE

## 2022-08-27 PROCEDURE — 27000221 HC OXYGEN, UP TO 24 HOURS

## 2022-08-27 PROCEDURE — 94761 N-INVAS EAR/PLS OXIMETRY MLT: CPT

## 2022-08-27 PROCEDURE — 63600175 PHARM REV CODE 636 W HCPCS: Performed by: NURSE PRACTITIONER

## 2022-08-27 PROCEDURE — 99223 1ST HOSP IP/OBS HIGH 75: CPT | Mod: AI,,, | Performed by: FAMILY MEDICINE

## 2022-08-27 PROCEDURE — 99238 HOSP IP/OBS DSCHRG MGMT 30/<: CPT | Mod: ,,, | Performed by: NURSE PRACTITIONER

## 2022-08-27 PROCEDURE — 99238 PR HOSPITAL DISCHARGE DAY,<30 MIN: ICD-10-PCS | Mod: ,,, | Performed by: NURSE PRACTITIONER

## 2022-08-27 PROCEDURE — S0073 INJECTION, AZTREONAM, 500 MG: HCPCS | Performed by: FAMILY MEDICINE

## 2022-08-27 RX ORDER — ATORVASTATIN CALCIUM 40 MG/1
40 TABLET, FILM COATED ORAL NIGHTLY
Status: DISCONTINUED | OUTPATIENT
Start: 2022-08-27 | End: 2022-09-14 | Stop reason: HOSPADM

## 2022-08-27 RX ORDER — GABAPENTIN 100 MG/1
200 CAPSULE ORAL NIGHTLY
Status: DISCONTINUED | OUTPATIENT
Start: 2022-08-27 | End: 2022-09-14 | Stop reason: HOSPADM

## 2022-08-27 RX ORDER — MEGESTROL ACETATE 40 MG/1
40 TABLET ORAL EVERY MORNING
Status: DISCONTINUED | OUTPATIENT
Start: 2022-08-28 | End: 2022-09-14 | Stop reason: HOSPADM

## 2022-08-27 RX ORDER — ESCITALOPRAM OXALATE 10 MG/1
10 TABLET ORAL NIGHTLY
Status: CANCELLED | OUTPATIENT
Start: 2022-08-27

## 2022-08-27 RX ORDER — INSULIN ASPART 100 [IU]/ML
3 INJECTION, SOLUTION INTRAVENOUS; SUBCUTANEOUS
Status: DISCONTINUED | OUTPATIENT
Start: 2022-08-27 | End: 2022-08-28

## 2022-08-27 RX ORDER — ASPIRIN 81 MG/1
81 TABLET ORAL DAILY
Status: DISCONTINUED | OUTPATIENT
Start: 2022-08-28 | End: 2022-09-14 | Stop reason: HOSPADM

## 2022-08-27 RX ORDER — ALLOPURINOL 100 MG/1
100 TABLET ORAL DAILY
Status: DISCONTINUED | OUTPATIENT
Start: 2022-08-28 | End: 2022-09-14 | Stop reason: HOSPADM

## 2022-08-27 RX ORDER — SIMETHICONE 80 MG
1 TABLET,CHEWABLE ORAL 3 TIMES DAILY PRN
Status: CANCELLED | OUTPATIENT
Start: 2022-08-27

## 2022-08-27 RX ORDER — SODIUM CHLORIDE 9 MG/ML
INJECTION, SOLUTION INTRAVENOUS
Status: DISCONTINUED | OUTPATIENT
Start: 2022-08-27 | End: 2022-09-14 | Stop reason: HOSPADM

## 2022-08-27 RX ORDER — TRAZODONE HYDROCHLORIDE 50 MG/1
50 TABLET ORAL NIGHTLY PRN
Status: CANCELLED | OUTPATIENT
Start: 2022-08-27

## 2022-08-27 RX ORDER — HYDROCODONE BITARTRATE AND ACETAMINOPHEN 10; 325 MG/1; MG/1
1 TABLET ORAL EVERY 6 HOURS PRN
Status: CANCELLED | OUTPATIENT
Start: 2022-08-27

## 2022-08-27 RX ORDER — SODIUM CHLORIDE 9 MG/ML
INJECTION, SOLUTION INTRAVENOUS
Status: CANCELLED | OUTPATIENT
Start: 2022-08-27

## 2022-08-27 RX ORDER — HEPARIN SODIUM 1000 [USP'U]/ML
4000 INJECTION, SOLUTION INTRAVENOUS; SUBCUTANEOUS
Status: DISCONTINUED | OUTPATIENT
Start: 2022-08-27 | End: 2022-09-14 | Stop reason: HOSPADM

## 2022-08-27 RX ORDER — PANTOPRAZOLE SODIUM 40 MG/1
40 TABLET, DELAYED RELEASE ORAL DAILY
Status: DISCONTINUED | OUTPATIENT
Start: 2022-08-28 | End: 2022-09-14 | Stop reason: HOSPADM

## 2022-08-27 RX ORDER — TALC
3 POWDER (GRAM) TOPICAL NIGHTLY
Status: CANCELLED | OUTPATIENT
Start: 2022-08-27

## 2022-08-27 RX ORDER — MIDODRINE HYDROCHLORIDE 5 MG/1
10 TABLET ORAL 3 TIMES DAILY
Status: CANCELLED | OUTPATIENT
Start: 2022-08-27

## 2022-08-27 RX ORDER — BISACODYL 5 MG
10 TABLET, DELAYED RELEASE (ENTERIC COATED) ORAL DAILY PRN
Status: DISCONTINUED | OUTPATIENT
Start: 2022-08-27 | End: 2022-09-14 | Stop reason: HOSPADM

## 2022-08-27 RX ORDER — ALBUMIN HUMAN 250 G/1000ML
25 SOLUTION INTRAVENOUS ONCE
Status: COMPLETED | OUTPATIENT
Start: 2022-08-27 | End: 2022-08-27

## 2022-08-27 RX ORDER — MIDODRINE HYDROCHLORIDE 5 MG/1
10 TABLET ORAL 3 TIMES DAILY
Status: DISCONTINUED | OUTPATIENT
Start: 2022-08-27 | End: 2022-08-27 | Stop reason: HOSPADM

## 2022-08-27 RX ORDER — SODIUM CHLORIDE 0.9 % (FLUSH) 0.9 %
10 SYRINGE (ML) INJECTION EVERY 12 HOURS PRN
Status: DISCONTINUED | OUTPATIENT
Start: 2022-08-27 | End: 2022-09-14 | Stop reason: HOSPADM

## 2022-08-27 RX ORDER — ASPIRIN 81 MG/1
81 TABLET ORAL DAILY
Status: CANCELLED | OUTPATIENT
Start: 2022-08-28

## 2022-08-27 RX ORDER — INSULIN ASPART 100 [IU]/ML
3 INJECTION, SOLUTION INTRAVENOUS; SUBCUTANEOUS
Status: CANCELLED | OUTPATIENT
Start: 2022-08-27

## 2022-08-27 RX ORDER — ONDANSETRON 2 MG/ML
8 INJECTION INTRAMUSCULAR; INTRAVENOUS EVERY 6 HOURS PRN
Status: CANCELLED | OUTPATIENT
Start: 2022-08-27

## 2022-08-27 RX ORDER — ACETAMINOPHEN 500 MG
1 TABLET ORAL DAILY
Status: CANCELLED | OUTPATIENT
Start: 2022-08-28

## 2022-08-27 RX ORDER — BISACODYL 5 MG
10 TABLET, DELAYED RELEASE (ENTERIC COATED) ORAL DAILY PRN
Status: CANCELLED | OUTPATIENT
Start: 2022-08-27

## 2022-08-27 RX ORDER — LEVOTHYROXINE SODIUM 100 UG/1
100 TABLET ORAL EVERY MORNING
Status: DISCONTINUED | OUTPATIENT
Start: 2022-08-28 | End: 2022-09-14 | Stop reason: HOSPADM

## 2022-08-27 RX ORDER — GUAIFENESIN/DEXTROMETHORPHAN 100-10MG/5
10 SYRUP ORAL EVERY 6 HOURS PRN
Status: CANCELLED | OUTPATIENT
Start: 2022-08-27

## 2022-08-27 RX ORDER — HEPARIN SODIUM 1000 [USP'U]/ML
4000 INJECTION, SOLUTION INTRAVENOUS; SUBCUTANEOUS
Status: CANCELLED | OUTPATIENT
Start: 2022-08-27

## 2022-08-27 RX ORDER — ACETAMINOPHEN 500 MG
1000 TABLET ORAL EVERY 6 HOURS PRN
Status: CANCELLED | OUTPATIENT
Start: 2022-08-27

## 2022-08-27 RX ORDER — MUPIROCIN 20 MG/G
OINTMENT TOPICAL 2 TIMES DAILY
Status: CANCELLED | OUTPATIENT
Start: 2022-08-27 | End: 2022-08-30

## 2022-08-27 RX ORDER — TRAZODONE HYDROCHLORIDE 50 MG/1
50 TABLET ORAL NIGHTLY PRN
Status: DISCONTINUED | OUTPATIENT
Start: 2022-08-27 | End: 2022-09-14 | Stop reason: HOSPADM

## 2022-08-27 RX ORDER — HYDROCODONE BITARTRATE AND ACETAMINOPHEN 10; 325 MG/1; MG/1
1 TABLET ORAL EVERY 6 HOURS PRN
Status: DISCONTINUED | OUTPATIENT
Start: 2022-08-27 | End: 2022-08-28

## 2022-08-27 RX ORDER — MIDODRINE HYDROCHLORIDE 5 MG/1
10 TABLET ORAL 3 TIMES DAILY
Status: DISCONTINUED | OUTPATIENT
Start: 2022-08-27 | End: 2022-09-14 | Stop reason: HOSPADM

## 2022-08-27 RX ORDER — SODIUM CHLORIDE 0.9 % (FLUSH) 0.9 %
10 SYRINGE (ML) INJECTION EVERY 12 HOURS PRN
Status: CANCELLED | OUTPATIENT
Start: 2022-08-27

## 2022-08-27 RX ORDER — GABAPENTIN 100 MG/1
200 CAPSULE ORAL NIGHTLY
Status: CANCELLED | OUTPATIENT
Start: 2022-08-27

## 2022-08-27 RX ORDER — TALC
3 POWDER (GRAM) TOPICAL NIGHTLY
Status: DISCONTINUED | OUTPATIENT
Start: 2022-08-27 | End: 2022-09-14 | Stop reason: HOSPADM

## 2022-08-27 RX ORDER — INSULIN ASPART 100 [IU]/ML
0-5 INJECTION, SOLUTION INTRAVENOUS; SUBCUTANEOUS
Status: DISCONTINUED | OUTPATIENT
Start: 2022-08-27 | End: 2022-09-14 | Stop reason: HOSPADM

## 2022-08-27 RX ORDER — PANTOPRAZOLE SODIUM 40 MG/1
40 TABLET, DELAYED RELEASE ORAL DAILY
Status: CANCELLED | OUTPATIENT
Start: 2022-08-28

## 2022-08-27 RX ORDER — MUPIROCIN 20 MG/G
OINTMENT TOPICAL 2 TIMES DAILY
Status: COMPLETED | OUTPATIENT
Start: 2022-08-27 | End: 2022-08-30

## 2022-08-27 RX ORDER — GLUCAGON 1 MG
1 KIT INJECTION
Status: DISCONTINUED | OUTPATIENT
Start: 2022-08-27 | End: 2022-09-14 | Stop reason: HOSPADM

## 2022-08-27 RX ORDER — GLUCAGON 1 MG
1 KIT INJECTION
Status: CANCELLED | OUTPATIENT
Start: 2022-08-27

## 2022-08-27 RX ORDER — INSULIN ASPART 100 [IU]/ML
0-5 INJECTION, SOLUTION INTRAVENOUS; SUBCUTANEOUS
Status: CANCELLED | OUTPATIENT
Start: 2022-08-27

## 2022-08-27 RX ORDER — MIDODRINE HYDROCHLORIDE 5 MG/1
5 TABLET ORAL ONCE
Status: COMPLETED | OUTPATIENT
Start: 2022-08-27 | End: 2022-08-27

## 2022-08-27 RX ORDER — MIDODRINE HYDROCHLORIDE 5 MG/1
5 TABLET ORAL ONCE
Status: DISCONTINUED | OUTPATIENT
Start: 2022-08-27 | End: 2022-08-27

## 2022-08-27 RX ORDER — ALLOPURINOL 100 MG/1
100 TABLET ORAL DAILY
Status: CANCELLED | OUTPATIENT
Start: 2022-08-28

## 2022-08-27 RX ORDER — MEGESTROL ACETATE 40 MG/1
40 TABLET ORAL EVERY MORNING
Status: CANCELLED | OUTPATIENT
Start: 2022-08-28

## 2022-08-27 RX ORDER — FOLIC ACID 1 MG/1
1000 TABLET ORAL EVERY MORNING
Status: CANCELLED | OUTPATIENT
Start: 2022-08-28

## 2022-08-27 RX ORDER — ACETAMINOPHEN 500 MG
1 TABLET ORAL DAILY
Status: DISCONTINUED | OUTPATIENT
Start: 2022-08-28 | End: 2022-08-31

## 2022-08-27 RX ORDER — SIMETHICONE 80 MG
1 TABLET,CHEWABLE ORAL 3 TIMES DAILY PRN
Status: DISCONTINUED | OUTPATIENT
Start: 2022-08-27 | End: 2022-09-14 | Stop reason: HOSPADM

## 2022-08-27 RX ORDER — FOLIC ACID 1 MG/1
1000 TABLET ORAL EVERY MORNING
Status: DISCONTINUED | OUTPATIENT
Start: 2022-08-28 | End: 2022-09-14 | Stop reason: HOSPADM

## 2022-08-27 RX ORDER — ESCITALOPRAM OXALATE 10 MG/1
10 TABLET ORAL NIGHTLY
Status: DISCONTINUED | OUTPATIENT
Start: 2022-08-27 | End: 2022-09-14 | Stop reason: HOSPADM

## 2022-08-27 RX ORDER — LEVOTHYROXINE SODIUM 100 UG/1
100 TABLET ORAL EVERY MORNING
Status: CANCELLED | OUTPATIENT
Start: 2022-08-28

## 2022-08-27 RX ORDER — ATORVASTATIN CALCIUM 40 MG/1
40 TABLET, FILM COATED ORAL NIGHTLY
Status: CANCELLED | OUTPATIENT
Start: 2022-08-27

## 2022-08-27 RX ORDER — GUAIFENESIN/DEXTROMETHORPHAN 100-10MG/5
10 SYRUP ORAL EVERY 6 HOURS PRN
Status: DISCONTINUED | OUTPATIENT
Start: 2022-08-27 | End: 2022-09-14 | Stop reason: HOSPADM

## 2022-08-27 RX ADMIN — MIDODRINE HYDROCHLORIDE 10 MG: 5 TABLET ORAL at 09:08

## 2022-08-27 RX ADMIN — LEVOTHYROXINE SODIUM 100 MCG: 100 TABLET ORAL at 06:08

## 2022-08-27 RX ADMIN — INSULIN ASPART 3 UNITS: 100 INJECTION, SOLUTION INTRAVENOUS; SUBCUTANEOUS at 05:08

## 2022-08-27 RX ADMIN — MEGESTROL ACETATE 40 MG: 40 TABLET ORAL at 06:08

## 2022-08-27 RX ADMIN — Medication 3 MG: at 09:08

## 2022-08-27 RX ADMIN — SODIUM CHLORIDE: 9 INJECTION, SOLUTION INTRAVENOUS at 11:08

## 2022-08-27 RX ADMIN — APIXABAN 2.5 MG: 2.5 TABLET, FILM COATED ORAL at 09:08

## 2022-08-27 RX ADMIN — APIXABAN 2.5 MG: 2.5 TABLET, FILM COATED ORAL at 08:08

## 2022-08-27 RX ADMIN — GABAPENTIN 200 MG: 100 CAPSULE ORAL at 09:08

## 2022-08-27 RX ADMIN — ALLOPURINOL 100 MG: 100 TABLET ORAL at 08:08

## 2022-08-27 RX ADMIN — ESCITALOPRAM OXALATE 10 MG: 10 TABLET ORAL at 09:08

## 2022-08-27 RX ADMIN — SODIUM CHLORIDE 500 ML: 4.5 INJECTION, SOLUTION INTRAVENOUS at 09:08

## 2022-08-27 RX ADMIN — MUPIROCIN: 20 OINTMENT TOPICAL at 08:08

## 2022-08-27 RX ADMIN — ALBUMIN (HUMAN) 25 G: 12.5 SOLUTION INTRAVENOUS at 03:08

## 2022-08-27 RX ADMIN — NOREPINEPHRINE BITARTRATE 0.02 MCG/KG/MIN: 1 INJECTION, SOLUTION, CONCENTRATE INTRAVENOUS at 09:08

## 2022-08-27 RX ADMIN — INSULIN ASPART 2 UNITS: 100 INJECTION, SOLUTION INTRAVENOUS; SUBCUTANEOUS at 05:08

## 2022-08-27 RX ADMIN — ACETAMINOPHEN 1000 MG: 500 TABLET ORAL at 04:08

## 2022-08-27 RX ADMIN — ASPIRIN 81 MG: 81 TABLET, COATED ORAL at 08:08

## 2022-08-27 RX ADMIN — FOLIC ACID 1000 MCG: 1 TABLET ORAL at 06:08

## 2022-08-27 RX ADMIN — Medication 5000 UNITS: at 08:08

## 2022-08-27 RX ADMIN — INSULIN DETEMIR 10 UNITS: 100 INJECTION, SOLUTION SUBCUTANEOUS at 09:08

## 2022-08-27 RX ADMIN — HYDROCODONE BITARTRATE AND ACETAMINOPHEN 1 TABLET: 10; 325 TABLET ORAL at 12:08

## 2022-08-27 RX ADMIN — AZTREONAM 1000 MG: 1 INJECTION, POWDER, LYOPHILIZED, FOR SOLUTION INTRAMUSCULAR; INTRAVENOUS at 08:08

## 2022-08-27 RX ADMIN — MIDODRINE HYDROCHLORIDE 5 MG: 5 TABLET ORAL at 12:08

## 2022-08-27 RX ADMIN — MIDODRINE HYDROCHLORIDE 10 MG: 5 TABLET ORAL at 03:08

## 2022-08-27 RX ADMIN — HYDROCODONE BITARTRATE AND ACETAMINOPHEN 1 TABLET: 10; 325 TABLET ORAL at 02:08

## 2022-08-27 RX ADMIN — MUPIROCIN: 20 OINTMENT TOPICAL at 09:08

## 2022-08-27 RX ADMIN — PANTOPRAZOLE SODIUM 40 MG: 40 TABLET, DELAYED RELEASE ORAL at 08:08

## 2022-08-27 RX ADMIN — MIDODRINE HYDROCHLORIDE 5 MG: 5 TABLET ORAL at 08:08

## 2022-08-27 RX ADMIN — ATORVASTATIN CALCIUM 40 MG: 40 TABLET, FILM COATED ORAL at 09:08

## 2022-08-27 RX ADMIN — HEPARIN SODIUM 4000 UNITS: 1000 INJECTION, SOLUTION INTRAVENOUS; SUBCUTANEOUS at 11:08

## 2022-08-27 NOTE — SUBJECTIVE & OBJECTIVE
Interval History:  Ms. Nunes was seen in the ICU this a.m. while she was on dialysis.  Blood pressure was stable at that time.  She was alert.  She denied shortness of breath.    Review of patient's allergies indicates:   Allergen Reactions    Milk containing products     Pcn [penicillins]      Current Facility-Administered Medications   Medication Frequency    0.9%  NaCl infusion PRN    albumin human 25% bottle 25 g Once    [START ON 8/28/2022] allopurinoL tablet 100 mg Daily    apixaban tablet 2.5 mg BID    [START ON 8/28/2022] aspirin EC tablet 81 mg Daily    atorvastatin tablet 40 mg QHS    [START ON 8/28/2022] aztreonam (AZACTAM) 1,000 mg in dextrose 5 % in water (D5W) 5 % 50 mL IVPB (MB+) Daily    bisacodyL EC tablet 10 mg Daily PRN    [START ON 8/28/2022] cholecalciferol (vitamin D3) 125 mcg (5,000 unit) tablet 5,000 Units Daily    [START ON 8/28/2022] collagenase ointment Daily    dextromethorphan-guaiFENesin  mg/5 ml liquid 10 mL Q6H PRN    dextrose 50% injection 12.5 g PRN    dextrose 50% injection 25 g PRN    EScitalopram oxalate tablet 10 mg QHS    [START ON 8/28/2022] folic acid tablet 1,000 mcg QAM    gabapentin capsule 200 mg QHS    glucagon (human recombinant) injection 1 mg PRN    heparin (porcine) injection 4,000 Units PRN    HYDROcodone-acetaminophen  mg per tablet 1 tablet Q6H PRN    insulin aspart U-100 injection 0-5 Units QID (AC + HS) PRN    insulin aspart U-100 injection 3 Units TIDWM    insulin detemir U-100 injection 10 Units QHS    [START ON 8/28/2022] levothyroxine tablet 100 mcg QAM    [START ON 8/28/2022] megestroL tablet 40 mg QAM    melatonin tablet 3 mg QHS    midodrine tablet 10 mg TID    mupirocin 2 % ointment BID    [START ON 8/28/2022] pantoprazole EC tablet 40 mg Daily    simethicone chewable tablet 80 mg TID PRN    sodium chloride 0.9% flush 10 mL Q12H PRN    traZODone tablet 50 mg Nightly PRN       Objective:     Vital Signs (Most Recent):  Temp: 97.7 °F (36.5 °C)  (08/27/22 1415)  Pulse: 107 (08/27/22 1415)  Resp: 12 (08/27/22 1415)  BP: (!) 73/62 (Dr. Espinosa aware.) (08/27/22 1530)  SpO2: 97 % (08/27/22 1530)  O2 Device (Oxygen Therapy): nasal cannula (08/27/22 1415)   Vital Signs (24h Range):  Temp:  [97.7 °F (36.5 °C)-98.9 °F (37.2 °C)] 97.7 °F (36.5 °C)  Pulse:  [] 107  Resp:  [8-22] 12  SpO2:  [77 %-100 %] 97 %  BP: ()/(21-94) 73/62     Weight: (!) 139.1 kg (306 lb 10.2 oz) (08/27/22 1415)  Body mass index is 54.32 kg/m².  Body surface area is 2.49 meters squared.    No intake/output data recorded.    Physical Exam  Eyes:      Pupils: Pupils are equal, round, and reactive to light.   Cardiovascular:      Rate and Rhythm: Normal rate and regular rhythm.   Pulmonary:      Breath sounds: No wheezing or rales.   Abdominal:      Tenderness: There is no abdominal tenderness.   Musculoskeletal:      Cervical back: Neck supple.      Right lower leg: Edema present.   Neurological:      Mental Status: She is alert.       Significant Labs:  BMP:   Recent Labs   Lab 08/27/22  0538   *   *   K 4.1   CL 98   CO2 26   BUN 25*   CREATININE 6.65*   CALCIUM 9.0   MG 1.7     CBC:   Recent Labs   Lab 08/27/22  0538   WBC 15.24*   RBC 2.79*   HGB 8.6*   HCT 27.7*      MCV 99.3*   MCH 30.8   MCHC 31.0*        Significant Imaging:

## 2022-08-27 NOTE — PLAN OF CARE
Problem: Bariatric Environmental Safety  Goal: Safety Maintained with Care  Outcome: Ongoing, Progressing  Intervention: Promote Safety and Comfort  Flowsheets (Taken 8/26/2022 1953)  Bariatric Safety: air-displacement transfer device utilized     Problem: Skin Injury Risk Increased  Goal: Skin Health and Integrity  Outcome: Ongoing, Progressing  Intervention: Optimize Skin Protection  Flowsheets (Taken 8/26/2022 1953)  Pressure Reduction Techniques:   frequent weight shift encouraged   weight shift assistance provided  Pressure Reduction Devices:   alternating pressure pump (ADD)   pressure-redistributing mattress utilized  Skin Protection:   adhesive use limited   skin-to-device areas padded  Head of Bed (HOB) Positioning: HOB at 30-45 degrees

## 2022-08-27 NOTE — HOSPITAL COURSE
HPI -- 08/25/22-- 67yo admitted to [facility] from [facility]. Patient with infected left amputation stump, right heel ulcer and sacral ulcer. Wound culture reveals E.Coli and Proteus both of which are sensitive to Azactam. Patient previously on Midodrine outpatient for hypotension. This has been continued on this in [facility] and will continue in [facility]. Patient with ESRD and will continue her Dialysis of TTS. Patient on Eliquis for atrial fibrillation. Patient with ROXANN and uses CPAP at home.       Hospital course - pt transferred to ICU for hypotension after HD and briefly required levo.  Daughter states that she is actually not taking any HTN medications at home and takes  Midodrine.  Her Norvasc was stopped and she was started on Midodrine. She tolerated HD today. She does have some occasionally low BP readings but is asymptomatic. Her wound culture grew out Proteus mirabilis and Ecoli both sensitive to Azactam. 1/2 BC with staph epi - vancomycin was stopped as this was felt to be a contaminate. She has met maximal benefit of this hospitalization and will be discharged to speciality hospital for continued IV antibiotics and wound care.

## 2022-08-27 NOTE — DISCHARGE SUMMARY
[facility]  Pulmonology  Discharge Summary      Patient Name: Michelle Nunes  MRN: [id number]  Admission Date: 8/24/2022  Hospital Length of Stay: 3 days  Discharge Date and Time:  08/27/2022 3:03 PM  Attending Physician: Laina att. providers found   Discharging Provider: Trini Valdes AG-ACNP  Primary Care Provider: Primary Doctor Laina    HPI:  HPI -- 08/25/22-- 67yo admitted to [facility] from [facility]. Patient with infected left amputation stump, right heel ulcer and sacral ulcer. Wound culture reveals E.Coli and Proteus both of which are sensitive to Azactam. Patient previously on Midodrine outpatient for hypotension. This has been continued on this in [facility] and will continue in [facility]. Patient with ESRD and will continue her Dialysis of TTS. Patient on Eliquis for atrial fibrillation. Patient with ROXANN and uses CPAP at home.       * No surgery found *    Indwelling Lines/Drains at Time of Discharge:   Lines/Drains/Airways     Central Venous Catheter Line  Duration                Hemodialysis Catheter left subclavian -- days                Hospital Course:       Hospital course - pt transferred to ICU for hypotension after HD and briefly required levo.  Daughter states that she is actually not taking any HTN medications at home and takes  Midodrine.  Her Norvasc was stopped and she was started on Midodrine. She tolerated HD today. She does have some occasionally low BP readings but is asymptomatic. Her wound culture grew out Proteus mirabilis and Ecoli both sensitive to Azactam. 1/2 BC with staph epi - vancomycin was stopped as this was felt to be a contaminate. She has met maximal benefit of this hospitalization and will be discharged to speciality hospital for continued IV antibiotics and wound care.          Goals of Care Treatment Preferences:  Code Status: Full Code      Consults (From admission, onward)        Status Ordering Provider     Inpatient  consult to Social Work  Once        Provider:  (Not yet assigned)    Completed THI WELLS          Significant Labs:  All pertinent labs within the past 24 hours have been reviewed.    Significant Imaging:  I have reviewed all pertinent imaging results/findings within the past 24 hours.    Pending Diagnostic Studies:     Procedure Component Value Units Date/Time    EXTRA TUBES [[id number]] Collected: 08/24/22 1944    Order Status: Sent Lab Status: In process Updated: 08/24/22 2001    Specimen: Blood, Venous     Narrative:      The following orders were created for panel order EXTRA TUBES.  Procedure                               Abnormality         Status                     ---------                               -----------         ------                     Light Green Top Hold[[id number]]                             In process                   Please view results for these tests on the individual orders.        Final Active Diagnoses:    Diagnosis Date Noted POA    PRINCIPAL PROBLEM:  Infection of amputation stump [T87.40] 08/25/2022 Yes    Hypotension [I95.9] 08/26/2022 No    Diabetic ulcer of right heel associated with type 2 diabetes mellitus [E11.621, L97.419] 08/25/2022 Yes    Deep vein thrombosis (DVT) of lower extremity [I82.409] 08/25/2022 No    ESRD on dialysis [N18.6, Z99.2] 08/25/2022 Not Applicable    Type 2 diabetes mellitus [E11.9] 08/25/2022 Yes    HTN (hypertension) [I10] 08/25/2022 Yes    Pressure injury of buttock, unstageable [L89.300] 08/25/2022 Yes    Atrial fibrillation, controlled [I48.91] 08/25/2022 Yes    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  Medications:  Transfer Medications (for Discharge Readmit only):   No current facility-administered medications for this encounter.     No current outpatient medications on file.     Facility-Administered Medications Ordered in Other Encounters   Medication Dose Route Frequency Provider Last Rate Last Admin    0.9%  NaCl infusion   Intravenous PRN Trini M Valdes, AG-ACNP        [START ON 8/28/2022] allopurinoL tablet 100 mg  100 mg Oral Daily Trini  Valdes, AG-ACNP        apixaban tablet 2.5 mg  2.5 mg Oral BID Trini M Valdes, AG-ACNP        [START ON 8/28/2022] aspirin EC tablet 81 mg  81 mg Oral Daily Trini M Valdes, AG-ACNP        atorvastatin tablet 40 mg  40 mg Oral QHS Trini M Valdes, AG-ACNP        [START ON 8/28/2022] aztreonam (AZACTAM) 1,000 mg in dextrose 5 % in water (D5W) 5 % 50 mL IVPB (MB+)  1,000 mg Intravenous Daily Trini  Valdes, AG-ACNP        bisacodyL EC tablet 10 mg  10 mg Oral Daily PRN [facility] Valdes, AG-ACNP        [START ON 8/28/2022] cholecalciferol (vitamin D3) 125 mcg (5,000 unit) tablet 5,000 Units  1 tablet Oral Daily Trini  Valdes, AG-ACNP        [START ON 8/28/2022] collagenase ointment   Topical (Top) Daily Trini [facility], AG-ACNP        dextromethorphan-guaiFENesin  mg/5 ml liquid 10 mL  10 mL Oral Q6H PRN [facility] Valdes, AG-ACNP        dextrose 50% injection 12.5 g  12.5 g Intravenous PRN Trini M Valdes, AG-ACNP        dextrose 50% injection 25 g  25 g Intravenous PRN Trini  Valdes, AG-ACNP        EScitalopram oxalate tablet 10 mg  10 mg Oral QHS Trini  Valdes, AG-ACNP        [START ON 8/28/2022] folic acid tablet 1,000 mcg  1,000 mcg Oral QAM [facility], AG-ACNP        gabapentin capsule 200 mg  200 mg Oral QHS DARIN Donovan-ACNP        glucagon (human recombinant) injection 1 mg  1 mg Intramuscular PRN DARIN Donovan-BEAN        heparin (porcine) injection  4,000 Units  4,000 Units Intra-Catheter PRN Trini M Valdes, AG-ACNP        HYDROcodone-acetaminophen  mg per tablet 1 tablet  1 tablet Oral Q6H PRN Trini M Valdes, AG-ACNP        insulin aspart U-100 injection 0-5 Units  0-5 Units Subcutaneous QID (AC + HS) PRN Trini M Valdes, AG-ACNP        insulin aspart U-100 injection 3 Units  3 Units Subcutaneous TIDWM Trini M Valdes, AG-ACNP        insulin detemir U-100 injection 10 Units  10 Units Subcutaneous QHS Trini M Valdes, AG-ACNP        [START ON 8/28/2022] levothyroxine tablet 100 mcg  100 mcg Oral QAM Trini  Valdes, AG-ACNP        [START ON 8/28/2022] megestroL tablet 40 mg  40 mg Oral QAM Trini M Valdes, AG-ACNP        melatonin tablet 3 mg  3 mg Oral QHS Trini M Valdes, AG-ACNP        midodrine tablet 10 mg  10 mg Oral TID Trini M Valdes, AG-ACNP        mupirocin 2 % ointment   Nasal BID Trini M Valdes, AG-ACNP        [START ON 8/28/2022] pantoprazole EC tablet 40 mg  40 mg Oral Daily Trini M Valdes, AG-ACNP        simethicone chewable tablet 80 mg  1 tablet Oral TID PRN Trini M Valdes, AG-ACNP        sodium chloride 0.9% flush 10 mL  10 mL Intravenous Q12H PRN Trini M Valdes, AG-ACNP        traZODone tablet 50 mg  50 mg Oral Nightly PRN Trini M Valdes, AG-ACNP           Trini M Vadles, AG-ACNP  Pulmonology  [facility]

## 2022-08-27 NOTE — NURSING
Patient arrived on unit via bariatric bed and taken to room 159. Patient transferred to unit with no oxygen on.

## 2022-08-27 NOTE — PROGRESS NOTES
[facility]  Nephrology  Progress Note    Patient Name: Michelle Nunes  MRN: [id number]  Admission Date: 8/27/2022  Hospital Length of Stay: 0 days  Attending Provider: Jose Luis Espinosa MD   Primary Care Physician: Primary Doctor No  Principal Problem:Infection of amputation stump    Subjective:     HPI: No notes on file    Interval History:  Ms. Nunes was seen in the ICU this a.m. while she was on dialysis.  Blood pressure was stable at that time.  She was alert.  She denied shortness of breath.    Review of patient's allergies indicates:   Allergen Reactions    Milk containing products     Pcn [penicillins]      Current Facility-Administered Medications   Medication Frequency    0.9%  NaCl infusion PRN    albumin human 25% bottle 25 g Once    [START ON 8/28/2022] allopurinoL tablet 100 mg Daily    apixaban tablet 2.5 mg BID    [START ON 8/28/2022] aspirin EC tablet 81 mg Daily    atorvastatin tablet 40 mg QHS    [START ON 8/28/2022] aztreonam (AZACTAM) 1,000 mg in dextrose 5 % in water (D5W) 5 % 50 mL IVPB (MB+) Daily    bisacodyL EC tablet 10 mg Daily PRN    [START ON 8/28/2022] cholecalciferol (vitamin D3) 125 mcg (5,000 unit) tablet 5,000 Units Daily    [START ON 8/28/2022] collagenase ointment Daily    dextromethorphan-guaiFENesin  mg/5 ml liquid 10 mL Q6H PRN    dextrose 50% injection 12.5 g PRN    dextrose 50% injection 25 g PRN    EScitalopram oxalate tablet 10 mg QHS    [START ON 8/28/2022] folic acid tablet 1,000 mcg QAM    gabapentin capsule 200 mg QHS    glucagon (human recombinant) injection 1 mg PRN    heparin (porcine) injection 4,000 Units PRN    HYDROcodone-acetaminophen  mg per tablet 1 tablet Q6H PRN    insulin aspart U-100 injection 0-5 Units QID (AC + HS) PRN    insulin aspart U-100 injection 3 Units TIDWM    insulin detemir U-100 injection 10 Units QHS    [START ON 8/28/2022] levothyroxine tablet 100 mcg QAM    [START ON  8/28/2022] megestroL tablet 40 mg QAM    melatonin tablet 3 mg QHS    midodrine tablet 10 mg TID    mupirocin 2 % ointment BID    [START ON 8/28/2022] pantoprazole EC tablet 40 mg Daily    simethicone chewable tablet 80 mg TID PRN    sodium chloride 0.9% flush 10 mL Q12H PRN    traZODone tablet 50 mg Nightly PRN       Objective:     Vital Signs (Most Recent):  Temp: 97.7 °F (36.5 °C) (08/27/22 1415)  Pulse: 107 (08/27/22 1415)  Resp: 12 (08/27/22 1415)  BP: (!) 73/62 (Dr. Espinosa aware.) (08/27/22 1530)  SpO2: 97 % (08/27/22 1530)  O2 Device (Oxygen Therapy): nasal cannula (08/27/22 1415)   Vital Signs (24h Range):  Temp:  [97.7 °F (36.5 °C)-98.9 °F (37.2 °C)] 97.7 °F (36.5 °C)  Pulse:  [] 107  Resp:  [8-22] 12  SpO2:  [77 %-100 %] 97 %  BP: ()/(21-94) 73/62     Weight: (!) 139.1 kg (306 lb 10.2 oz) (08/27/22 1415)  Body mass index is 54.32 kg/m².  Body surface area is 2.49 meters squared.    No intake/output data recorded.    Physical Exam  Eyes:      Pupils: Pupils are equal, round, and reactive to light.   Cardiovascular:      Rate and Rhythm: Normal rate and regular rhythm.   Pulmonary:      Breath sounds: No wheezing or rales.   Abdominal:      Tenderness: There is no abdominal tenderness.   Musculoskeletal:      Cervical back: Neck supple.      Right lower leg: Edema present.   Neurological:      Mental Status: She is alert.       Significant Labs:  BMP:   Recent Labs   Lab 08/27/22  0538   *   *   K 4.1   CL 98   CO2 26   BUN 25*   CREATININE 6.65*   CALCIUM 9.0   MG 1.7     CBC:   Recent Labs   Lab 08/27/22  0538   WBC 15.24*   RBC 2.79*   HGB 8.6*   HCT 27.7*      MCV 99.3*   MCH 30.8   MCHC 31.0*        Significant Imaging:      Assessment/Plan:     PVD (peripheral vascular disease)  Status post left BKA    Hypotension  She is on midodrine    ESRD on dialysis  Seen during dialysis.  Blood pressure is stable.        Thank you for your consult.     Adan Pandya,  MD  Nephrology  [facility]

## 2022-08-27 NOTE — SUBJECTIVE & OBJECTIVE
Past Medical History:   Diagnosis Date    A-fib     Blind right eye     Diabetes mellitus     ESRD (end stage renal disease) on dialysis     GERD (gastroesophageal reflux disease)     Gout, unspecified     Heart attack     HTN (hypertension)     Kidney failure     Neuropathy        Past Surgical History:   Procedure Laterality Date    CHOLECYSTECTOMY      HYSTERECTOMY      LEG AMPUTATION THROUGH KNEE Left        Review of patient's allergies indicates:   Allergen Reactions    Milk containing products     Pcn [penicillins]        Current Facility-Administered Medications on File Prior to Encounter   Medication    0.9%  NaCl infusion    allopurinoL tablet 100 mg    apixaban tablet 2.5 mg    aspirin EC tablet 81 mg    atorvastatin tablet 40 mg    aztreonam (AZACTAM) 1,000 mg in dextrose 5 % in water (D5W) 5 % 50 mL IVPB (MB+)    bisacodyL EC tablet 10 mg    cholecalciferol (vitamin D3) 125 mcg (5,000 unit) tablet 5,000 Units    collagenase ointment    dextromethorphan-guaiFENesin  mg/5 ml liquid 10 mL    dextrose 50% injection 12.5 g    dextrose 50% injection 25 g    EScitalopram oxalate tablet 10 mg    folic acid tablet 1,000 mcg    gabapentin capsule 200 mg    glucagon (human recombinant) injection 1 mg    heparin (porcine) injection 4,000 Units    HYDROcodone-acetaminophen  mg per tablet 1 tablet    insulin aspart U-100 injection 0-5 Units    insulin aspart U-100 injection 3 Units    insulin detemir U-100 injection 10 Units    levothyroxine tablet 100 mcg    megestroL tablet 40 mg    melatonin tablet 3 mg    midodrine tablet 10 mg    [COMPLETED] midodrine tablet 5 mg    mupirocin 2 % ointment    pantoprazole EC tablet 40 mg    simethicone chewable tablet 80 mg    sodium chloride 0.9% flush 10 mL    traZODone tablet 50 mg    [DISCONTINUED] acetaminophen tablet 1,000 mg    [DISCONTINUED] HYDROcodone-acetaminophen 7.5-325 mg per tablet 1 tablet    [DISCONTINUED] midodrine tablet 2.5 mg    [DISCONTINUED]  midodrine tablet 5 mg    [DISCONTINUED] midodrine tablet 5 mg    [DISCONTINUED] ondansetron injection 8 mg    [DISCONTINUED] vancomycin - pharmacy to dose     Current Outpatient Medications on File Prior to Encounter   Medication Sig    allopurinoL (ZYLOPRIM) 100 MG tablet Take 100 mg by mouth once daily.    amLODIPine (NORVASC) 5 MG tablet Take 1 tablet by mouth once daily.    aspirin (ECOTRIN) 81 MG EC tablet Take 81 mg by mouth once daily.    atorvastatin (LIPITOR) 40 MG tablet Take 40 mg by mouth every evening.    enoxaparin (LOVENOX) 120 mg/0.8 mL Syrg Inject 120 mg into the skin once daily.    EScitalopram oxalate (LEXAPRO) 10 MG tablet Take 10 mg by mouth every evening.    folic acid (FOLVITE) 1 MG tablet Take 1,000 mcg by mouth every morning.    gabapentin (NEURONTIN) 100 MG capsule Take 200 mg by mouth every evening.    HYDROcodone-acetaminophen (NORCO) 7.5-325 mg per tablet Take 1 tablet by mouth every 6 (six) hours as needed.    levothyroxine (SYNTHROID) 100 MCG tablet Take 100 mcg by mouth every morning.    megestroL (MEGACE) 40 MG Tab Take 40 mg by mouth every morning.    melatonin (MELATIN) 3 mg tablet Take 3 mg by mouth every evening. Take 2 tablets by mouth at bedtime    NOVOLIN 70/30 U-100 INSULIN 100 unit/mL (70-30) injection Inject 25 Units into the skin 2 (two) times a day.    pantoprazole (PROTONIX) 40 MG tablet Take 40 mg by mouth once daily.    VITAMIN D3 125 mcg (5,000 unit) Tab Take 1 tablet by mouth once daily.    warfarin (COUMADIN) 3 MG tablet 3 mg by Per G Tube route Daily. Take daily Tues through Sunday    warfarin (COUMADIN) 4 MG tablet Take 4 mg by mouth every Monday.    warfarin (COUMADIN) 5 MG tablet Take 5 mg by mouth Daily.     Family History       Problem Relation (Age of Onset)    Cancer Brother    Cirrhosis Mother    Colon cancer Brother    Diabetes Father    Hypertension Father          Tobacco Use    Smoking status: Never    Smokeless tobacco: Never   Substance and Sexual  Activity    Alcohol use: Never    Drug use: Never    Sexual activity: Not on file     Review of Systems   Constitutional:  Negative for fatigue.   Respiratory:  Positive for apnea. Negative for shortness of breath and wheezing.    Cardiovascular:  Negative for chest pain.   Gastrointestinal:  Positive for abdominal distention.   Musculoskeletal:  Positive for arthralgias.   Objective:     Vital Signs (Most Recent):    Vital Signs (24h Range):  Temp:  [97.9 °F (36.6 °C)-98.9 °F (37.2 °C)] 97.9 °F (36.6 °C)  Pulse:  [] 102  Resp:  [9-22] 10  SpO2:  [80 %-100 %] 98 %  BP: ()/() 72/49        There is no height or weight on file to calculate BMI.    Physical Exam  Vitals and nursing note reviewed.   Constitutional:       Appearance: She is obese.   HENT:      Head: Atraumatic.   Cardiovascular:      Rate and Rhythm: Regular rhythm.      Heart sounds: Normal heart sounds.   Pulmonary:      Breath sounds: Rhonchi present. No wheezing.   Abdominal:      Palpations: Abdomen is soft.   Musculoskeletal:      Comments: Left amputation    Skin:     Findings: Lesion present.      Comments: Sacral, right heel, amputation site with wounds   Neurological:      Mental Status: She is alert.           Significant Labs: All pertinent labs within the past 24 hours have been reviewed.  BMP:   Recent Labs   Lab 08/27/22  0538   *   *   K 4.1   CL 98   CO2 26   BUN 25*   CREATININE 6.65*   CALCIUM 9.0   MG 1.7     CBC:   Recent Labs   Lab 08/26/22  0835 08/27/22  0538   WBC 16.91* 15.24*   HGB 9.3* 8.6*   HCT 30.0* 27.7*    211     CMP:   Recent Labs   Lab 08/26/22  0835 08/27/22  0538   * 133*   K 3.8 4.1   CL 98 98   CO2 26 26    145*   BUN 19* 25*   CREATININE 5.82* 6.65*   CALCIUM 8.7 9.0   PROT  --  5.9*   ALBUMIN  --  1.9*   BILITOT  --  0.4   ALKPHOS  --  76   AST  --  6*   ALT  --  6*   ANIONGAP 13 13     POCT Glucose: No results for input(s): POCTGLUCOSE in the last 48  hours.    Significant Imaging:  none

## 2022-08-27 NOTE — PLAN OF CARE
Problem: Adult Inpatient Plan of Care  Goal: Plan of Care Review  Outcome: Ongoing, Progressing  Goal: Patient-Specific Goal (Individualized)  Outcome: Ongoing, Progressing  Goal: Absence of Hospital-Acquired Illness or Injury  Outcome: Ongoing, Progressing  Goal: Optimal Comfort and Wellbeing  Outcome: Ongoing, Progressing  Goal: Readiness for Transition of Care  Outcome: Ongoing, Progressing     Problem: Bariatric Environmental Safety  Goal: Safety Maintained with Care  Outcome: Ongoing, Progressing     Problem: Skin Injury Risk Increased  Goal: Skin Health and Integrity  Outcome: Ongoing, Progressing     Problem: Fall Injury Risk  Goal: Absence of Fall and Fall-Related Injury  Outcome: Ongoing, Progressing     Problem: Device-Related Complication Risk (Hemodialysis)  Goal: Safe, Effective Therapy Delivery  Outcome: Ongoing, Progressing     Problem: Hemodynamic Instability (Hemodialysis)  Goal: Effective Tissue Perfusion  Outcome: Ongoing, Progressing     Problem: Infection (Hemodialysis)  Goal: Absence of Infection Signs and Symptoms  Outcome: Ongoing, Progressing     Problem: Diabetes Comorbidity  Goal: Blood Glucose Level Within Targeted Range  Outcome: Ongoing, Progressing     Problem: Infection  Goal: Absence of Infection Signs and Symptoms  Outcome: Ongoing, Progressing     Problem: Impaired Wound Healing  Goal: Optimal Wound Healing  Outcome: Ongoing, Progressing     Problem: Gas Exchange Impaired  Goal: Optimal Gas Exchange  Outcome: Ongoing, Progressing

## 2022-08-27 NOTE — H&P
[facility] Hospital Medicine  History & Physical    Patient Name: Michelle Nunes  MRN: [id number]  Patient Class: IP- Inpatient  Admission Date: (Not on file)  Attending Physician: Jose Luis Espinosa MD   Primary Care Provider: Primary Doctor No         Patient information was obtained from patient and ER records.     Subjective:     Principal Problem:Infection of amputation stump    Chief Complaint:   Chief Complaint   Patient presents with    Wound Infection        HPI: 65yo admitted to [facility] from [facility]. Patient with infected left amputation stump, right heel ulcer and sacral ulcer. Wound culture reveals E.Coli and Proteus both of which are sensitive to Azactam. Patient previously on Midodrine outpatient for hypotension. This has been continued on this in [facility] and will continue in [facility]. Patient with ESRD and will continue her Dialysis of TTS. Patient on Eliquis for atrial fibrillation. Patient with ROXANN and uses CPAP at home      Past Medical History:   Diagnosis Date    A-fib     Blind right eye     Diabetes mellitus     ESRD (end stage renal disease) on dialysis     GERD (gastroesophageal reflux disease)     Gout, unspecified     Heart attack     HTN (hypertension)     Kidney failure     Neuropathy        Past Surgical History:   Procedure Laterality Date    CHOLECYSTECTOMY      HYSTERECTOMY      LEG AMPUTATION THROUGH KNEE Left        Review of patient's allergies indicates:   Allergen Reactions    Milk containing products     Pcn [penicillins]        Current Facility-Administered Medications on File Prior to Encounter   Medication    0.9%  NaCl infusion    allopurinoL tablet 100 mg    apixaban tablet 2.5 mg    aspirin EC tablet 81 mg    atorvastatin tablet 40 mg    aztreonam (AZACTAM) 1,000 mg in dextrose 5 % in water (D5W) 5 % 50 mL IVPB (MB+)    bisacodyL EC tablet 10 mg    cholecalciferol (vitamin D3) 125  mcg (5,000 unit) tablet 5,000 Units    collagenase ointment    dextromethorphan-guaiFENesin  mg/5 ml liquid 10 mL    dextrose 50% injection 12.5 g    dextrose 50% injection 25 g    EScitalopram oxalate tablet 10 mg    folic acid tablet 1,000 mcg    gabapentin capsule 200 mg    glucagon (human recombinant) injection 1 mg    heparin (porcine) injection 4,000 Units    HYDROcodone-acetaminophen  mg per tablet 1 tablet    insulin aspart U-100 injection 0-5 Units    insulin aspart U-100 injection 3 Units    insulin detemir U-100 injection 10 Units    levothyroxine tablet 100 mcg    megestroL tablet 40 mg    melatonin tablet 3 mg    midodrine tablet 10 mg    [COMPLETED] midodrine tablet 5 mg    mupirocin 2 % ointment    pantoprazole EC tablet 40 mg    simethicone chewable tablet 80 mg    sodium chloride 0.9% flush 10 mL    traZODone tablet 50 mg    [DISCONTINUED] acetaminophen tablet 1,000 mg    [DISCONTINUED] HYDROcodone-acetaminophen 7.5-325 mg per tablet 1 tablet    [DISCONTINUED] midodrine tablet 2.5 mg    [DISCONTINUED] midodrine tablet 5 mg    [DISCONTINUED] midodrine tablet 5 mg    [DISCONTINUED] ondansetron injection 8 mg    [DISCONTINUED] vancomycin - pharmacy to dose     Current Outpatient Medications on File Prior to Encounter   Medication Sig    allopurinoL (ZYLOPRIM) 100 MG tablet Take 100 mg by mouth once daily.    amLODIPine (NORVASC) 5 MG tablet Take 1 tablet by mouth once daily.    aspirin (ECOTRIN) 81 MG EC tablet Take 81 mg by mouth once daily.    atorvastatin (LIPITOR) 40 MG tablet Take 40 mg by mouth every evening.    enoxaparin (LOVENOX) 120 mg/0.8 mL Syrg Inject 120 mg into the skin once daily.    EScitalopram oxalate (LEXAPRO) 10 MG tablet Take 10 mg by mouth every evening.    folic acid (FOLVITE) 1 MG tablet Take 1,000 mcg by mouth every morning.    gabapentin (NEURONTIN) 100 MG capsule Take 200 mg by mouth every evening.     HYDROcodone-acetaminophen (NORCO) 7.5-325 mg per tablet Take 1 tablet by mouth every 6 (six) hours as needed.    levothyroxine (SYNTHROID) 100 MCG tablet Take 100 mcg by mouth every morning.    megestroL (MEGACE) 40 MG Tab Take 40 mg by mouth every morning.    melatonin (MELATIN) 3 mg tablet Take 3 mg by mouth every evening. Take 2 tablets by mouth at bedtime    NOVOLIN 70/30 U-100 INSULIN 100 unit/mL (70-30) injection Inject 25 Units into the skin 2 (two) times a day.    pantoprazole (PROTONIX) 40 MG tablet Take 40 mg by mouth once daily.    VITAMIN D3 125 mcg (5,000 unit) Tab Take 1 tablet by mouth once daily.    warfarin (COUMADIN) 3 MG tablet 3 mg by Per G Tube route Daily. Take daily Tues through Sunday    warfarin (COUMADIN) 4 MG tablet Take 4 mg by mouth every Monday.    warfarin (COUMADIN) 5 MG tablet Take 5 mg by mouth Daily.     Family History       Problem Relation (Age of Onset)    Cancer Brother    Cirrhosis Mother    Colon cancer Brother    Diabetes Father    Hypertension Father          Tobacco Use    Smoking status: Never    Smokeless tobacco: Never   Substance and Sexual Activity    Alcohol use: Never    Drug use: Never    Sexual activity: Not on file     Review of Systems   Constitutional:  Negative for fatigue.   Respiratory:  Positive for apnea. Negative for shortness of breath and wheezing.    Cardiovascular:  Negative for chest pain.   Gastrointestinal:  Positive for abdominal distention.   Musculoskeletal:  Positive for arthralgias.   Objective:     Vital Signs (Most Recent):    Vital Signs (24h Range):  Temp:  [97.9 °F (36.6 °C)-98.9 °F (37.2 °C)] 97.9 °F (36.6 °C)  Pulse:  [] 102  Resp:  [9-22] 10  SpO2:  [80 %-100 %] 98 %  BP: ()/() 72/49        There is no height or weight on file to calculate BMI.    Physical Exam  Vitals and nursing note reviewed.   Constitutional:       Appearance: She is obese.   HENT:      Head: Atraumatic.   Cardiovascular:      Rate  and Rhythm: Regular rhythm.      Heart sounds: Normal heart sounds.   Pulmonary:      Breath sounds: Rhonchi present. No wheezing.   Abdominal:      Palpations: Abdomen is soft.   Musculoskeletal:      Comments: Left amputation    Skin:     Findings: Lesion present.      Comments: Sacral, right heel, amputation site with wounds   Neurological:      Mental Status: She is alert.           Significant Labs: All pertinent labs within the past 24 hours have been reviewed.  BMP:   Recent Labs   Lab 08/27/22  0538   *   *   K 4.1   CL 98   CO2 26   BUN 25*   CREATININE 6.65*   CALCIUM 9.0   MG 1.7     CBC:   Recent Labs   Lab 08/26/22  0835 08/27/22  0538   WBC 16.91* 15.24*   HGB 9.3* 8.6*   HCT 30.0* 27.7*    211     CMP:   Recent Labs   Lab 08/26/22  0835 08/27/22  0538   * 133*   K 3.8 4.1   CL 98 98   CO2 26 26    145*   BUN 19* 25*   CREATININE 5.82* 6.65*   CALCIUM 8.7 9.0   PROT  --  5.9*   ALBUMIN  --  1.9*   BILITOT  --  0.4   ALKPHOS  --  76   AST  --  6*   ALT  --  6*   ANIONGAP 13 13     POCT Glucose: No results for input(s): POCTGLUCOSE in the last 48 hours.    Significant Imaging:  none    Assessment/Plan:     * Infection of amputation stump  IV Azactam      Apnea  BIPAP hs    Hypotension  Midodrine      ESRD on dialysis  TTS        VTE Risk Mitigation (From admission, onward)    None             Jose Luis Espinosa MD  Department of Hospital Medicine   [facility]

## 2022-08-27 NOTE — HPI
65yo admitted to [facility] from [facility]. Patient with infected left amputation stump, right heel ulcer and sacral ulcer. Wound culture reveals E.Coli and Proteus both of which are sensitive to Azactam. Patient previously on Midodrine outpatient for hypotension. This has been continued on this in [facility] and will continue in [facility]. Patient with ESRD and will continue her Dialysis of TTS. Patient on Eliquis for atrial fibrillation. Patient with ROXANN and uses CPAP at home

## 2022-08-28 PROBLEM — R06.81 APNEA: Status: RESOLVED | Noted: 2022-08-27 | Resolved: 2022-08-28

## 2022-08-28 PROBLEM — J96.02 ACUTE HYPERCAPNIC RESPIRATORY FAILURE: Status: ACTIVE | Noted: 2022-08-28

## 2022-08-28 LAB
BACTERIA BLD CULT: ABNORMAL
GLUCOSE SERPL-MCNC: 102 MG/DL (ref 70–105)
GLUCOSE SERPL-MCNC: 119 MG/DL (ref 70–105)
GLUCOSE SERPL-MCNC: 89 MG/DL (ref 70–105)
GLUCOSE SERPL-MCNC: 97 MG/DL (ref 70–105)
GRAM STN SPEC: ABNORMAL

## 2022-08-28 PROCEDURE — 25000003 PHARM REV CODE 250: Performed by: FAMILY MEDICINE

## 2022-08-28 PROCEDURE — 63600175 PHARM REV CODE 636 W HCPCS: Performed by: FAMILY MEDICINE

## 2022-08-28 PROCEDURE — 11000008

## 2022-08-28 PROCEDURE — 99900035 HC TECH TIME PER 15 MIN (STAT)

## 2022-08-28 PROCEDURE — 94660 CPAP INITIATION&MGMT: CPT

## 2022-08-28 PROCEDURE — 99291 PR CRITICAL CARE, E/M 30-74 MINUTES: ICD-10-PCS | Mod: ,,, | Performed by: NURSE PRACTITIONER

## 2022-08-28 PROCEDURE — 96372 THER/PROPH/DIAG INJ SC/IM: CPT

## 2022-08-28 PROCEDURE — 27000221 HC OXYGEN, UP TO 24 HOURS

## 2022-08-28 PROCEDURE — 27000190 HC CPAP FULL FACE MASK W/VALVE

## 2022-08-28 PROCEDURE — 27000934

## 2022-08-28 PROCEDURE — S0073 INJECTION, AZTREONAM, 500 MG: HCPCS | Performed by: FAMILY MEDICINE

## 2022-08-28 PROCEDURE — 94761 N-INVAS EAR/PLS OXIMETRY MLT: CPT

## 2022-08-28 PROCEDURE — 99291 CRITICAL CARE FIRST HOUR: CPT | Mod: ,,, | Performed by: NURSE PRACTITIONER

## 2022-08-28 PROCEDURE — 27000940

## 2022-08-28 PROCEDURE — 27000952

## 2022-08-28 PROCEDURE — 82962 GLUCOSE BLOOD TEST: CPT

## 2022-08-28 PROCEDURE — 25000003 PHARM REV CODE 250: Performed by: NURSE PRACTITIONER

## 2022-08-28 RX ORDER — AMOXICILLIN 250 MG
1 CAPSULE ORAL 2 TIMES DAILY
Status: DISCONTINUED | OUTPATIENT
Start: 2022-08-28 | End: 2022-08-30

## 2022-08-28 RX ORDER — POLYETHYLENE GLYCOL 3350 17 G/17G
17 POWDER, FOR SOLUTION ORAL DAILY
Status: DISCONTINUED | OUTPATIENT
Start: 2022-08-28 | End: 2022-08-30

## 2022-08-28 RX ORDER — ONDANSETRON 2 MG/ML
4 INJECTION INTRAMUSCULAR; INTRAVENOUS EVERY 8 HOURS PRN
Status: DISCONTINUED | OUTPATIENT
Start: 2022-08-28 | End: 2022-09-14 | Stop reason: HOSPADM

## 2022-08-28 RX ORDER — ACETAMINOPHEN 325 MG/1
650 TABLET ORAL EVERY 4 HOURS PRN
Status: DISCONTINUED | OUTPATIENT
Start: 2022-08-28 | End: 2022-09-14 | Stop reason: HOSPADM

## 2022-08-28 RX ORDER — HYDROCODONE BITARTRATE AND ACETAMINOPHEN 10; 325 MG/1; MG/1
1 TABLET ORAL EVERY 8 HOURS PRN
Status: DISCONTINUED | OUTPATIENT
Start: 2022-08-28 | End: 2022-09-14 | Stop reason: HOSPADM

## 2022-08-28 RX ADMIN — COLLAGENASE SANTYL: 250 OINTMENT TOPICAL at 06:08

## 2022-08-28 RX ADMIN — SENNOSIDES AND DOCUSATE SODIUM 1 TABLET: 8.6; 5 TABLET ORAL at 11:08

## 2022-08-28 RX ADMIN — ALLOPURINOL 100 MG: 100 TABLET ORAL at 09:08

## 2022-08-28 RX ADMIN — INSULIN DETEMIR 10 UNITS: 100 INJECTION, SOLUTION SUBCUTANEOUS at 09:08

## 2022-08-28 RX ADMIN — MIDODRINE HYDROCHLORIDE 10 MG: 5 TABLET ORAL at 09:08

## 2022-08-28 RX ADMIN — POLYETHYLENE GLYCOL 3350 17 G: 17 POWDER, FOR SOLUTION ORAL at 11:08

## 2022-08-28 RX ADMIN — NOREPINEPHRINE BITARTRATE 0.04 MCG/KG/MIN: 1 INJECTION, SOLUTION, CONCENTRATE INTRAVENOUS at 02:08

## 2022-08-28 RX ADMIN — ATORVASTATIN CALCIUM 40 MG: 40 TABLET, FILM COATED ORAL at 09:08

## 2022-08-28 RX ADMIN — MIDODRINE HYDROCHLORIDE 10 MG: 5 TABLET ORAL at 04:08

## 2022-08-28 RX ADMIN — ASPIRIN 81 MG: 81 TABLET, COATED ORAL at 09:08

## 2022-08-28 RX ADMIN — MUPIROCIN: 20 OINTMENT TOPICAL at 09:08

## 2022-08-28 RX ADMIN — LEVOTHYROXINE SODIUM 100 MCG: 100 TABLET ORAL at 09:08

## 2022-08-28 RX ADMIN — HYDROCODONE BITARTRATE AND ACETAMINOPHEN 1 TABLET: 10; 325 TABLET ORAL at 02:08

## 2022-08-28 RX ADMIN — Medication 3 MG: at 09:08

## 2022-08-28 RX ADMIN — Medication 5000 UNITS: at 09:08

## 2022-08-28 RX ADMIN — SENNOSIDES AND DOCUSATE SODIUM 1 TABLET: 8.6; 5 TABLET ORAL at 09:08

## 2022-08-28 RX ADMIN — MEGESTROL ACETATE 40 MG: 40 TABLET ORAL at 09:08

## 2022-08-28 RX ADMIN — ESCITALOPRAM OXALATE 10 MG: 10 TABLET ORAL at 09:08

## 2022-08-28 RX ADMIN — GABAPENTIN 200 MG: 100 CAPSULE ORAL at 09:08

## 2022-08-28 RX ADMIN — APIXABAN 2.5 MG: 2.5 TABLET, FILM COATED ORAL at 09:08

## 2022-08-28 RX ADMIN — AZTREONAM 1000 MG: 1 INJECTION, POWDER, LYOPHILIZED, FOR SOLUTION INTRAMUSCULAR; INTRAVENOUS at 10:08

## 2022-08-28 RX ADMIN — INSULIN ASPART 3 UNITS: 100 INJECTION, SOLUTION INTRAVENOUS; SUBCUTANEOUS at 11:08

## 2022-08-28 RX ADMIN — PANTOPRAZOLE SODIUM 40 MG: 40 TABLET, DELAYED RELEASE ORAL at 09:08

## 2022-08-28 RX ADMIN — FOLIC ACID 1000 MCG: 1 TABLET ORAL at 09:08

## 2022-08-28 RX ADMIN — MIDODRINE HYDROCHLORIDE 10 MG: 5 TABLET ORAL at 10:08

## 2022-08-28 RX ADMIN — BISACODYL 10 MG: 5 TABLET, COATED ORAL at 04:08

## 2022-08-28 NOTE — PT/OT/SLP PROGRESS
Occupational Therapy      Patient Name:  Michelle Nunes   MRN:  [id number]    Patient not seen today secondary to Nurse/ NAHEED hold. Pt is very sleepy due to CO2 retention. Pt was placed on BIPAP. OT will reattempt in the AM.    8/28/2022

## 2022-08-28 NOTE — PLAN OF CARE
Problem: Impaired Wound Healing  Goal: Optimal Wound Healing  Outcome: Ongoing, Progressing  Intervention: Promote Wound Healing  Flowsheets (Taken 8/28/2022 1634)  Oral Nutrition Promotion:   safe use of adaptive equipment encouraged   social interaction promoted  Sleep/Rest Enhancement:   relaxation techniques promoted   regular sleep/rest pattern promoted   consistent schedule promoted   family presence promoted   natural light exposure provided   noise level reduced   room darkened  Activity Management: Patient unable to perform activities  Pain Management Interventions:   quiet environment facilitated   relaxation techniques promoted   position adjusted

## 2022-08-28 NOTE — PT/OT/SLP PROGRESS
Physical Therapy      Patient Name:  Michelle Nunes   MRN:  [id number]    Patient not seen today secondary to Nurse/ NAHEED hold. Patient placed on BiPAP due to decreased alertness and CO2 retention. Patient not alert enough to complete PT/OT evaluations. Will follow-up tomorrow.

## 2022-08-28 NOTE — PLAN OF CARE
Problem: Adult Inpatient Plan of Care  Goal: Plan of Care Review  Outcome: Ongoing, Progressing  Goal: Patient-Specific Goal (Individualized)  Outcome: Ongoing, Progressing  Goal: Absence of Hospital-Acquired Illness or Injury  Outcome: Ongoing, Progressing  Goal: Optimal Comfort and Wellbeing  Outcome: Ongoing, Progressing  Goal: Readiness for Transition of Care  Outcome: Ongoing, Progressing     Problem: Diabetes Comorbidity  Goal: Blood Glucose Level Within Targeted Range  Outcome: Ongoing, Progressing     Problem: Bariatric Environmental Safety  Goal: Safety Maintained with Care  Outcome: Ongoing, Progressing     Problem: Infection  Goal: Absence of Infection Signs and Symptoms  Outcome: Ongoing, Progressing     Problem: Impaired Wound Healing  Goal: Optimal Wound Healing  Outcome: Ongoing, Progressing     Problem: Device-Related Complication Risk (Hemodialysis)  Goal: Safe, Effective Therapy Delivery  Outcome: Ongoing, Progressing     Problem: Hemodynamic Instability (Hemodialysis)  Goal: Effective Tissue Perfusion  Outcome: Ongoing, Progressing     Problem: Infection (Hemodialysis)  Goal: Absence of Infection Signs and Symptoms  Outcome: Ongoing, Progressing     Problem: Fall Injury Risk  Goal: Absence of Fall and Fall-Related Injury  Outcome: Ongoing, Progressing     Problem: Skin Injury Risk Increased  Goal: Skin Health and Integrity  Outcome: Ongoing, Progressing

## 2022-08-28 NOTE — PROGRESS NOTES
[facility]  Pulmonology  Progress Note    Patient Name: Michelle Nunes  MRN: [id number]  Admission Date: 8/27/2022  Hospital Length of Stay: 1 days  Code Status: Full Code  Attending Provider: Baldemar Burk MD  Primary Care Provider: Primary Doctor No   Principal Problem: Infection of amputation stump    Subjective:     Interval History: lethargic on exam. Placed on bipap this morning. ABGs pending.. BP stable on levo     Objective:     Vital Signs (Most Recent):  Temp: 97.1 °F (36.2 °C) (08/28/22 1100)  Pulse: 93 (08/28/22 1215)  Resp: 15 (08/28/22 1215)  BP: (!) 90/54 (08/28/22 1215)  SpO2: 97 % (08/28/22 1200)   Vital Signs (24h Range):  Temp:  [97.1 °F (36.2 °C)-98.3 °F (36.8 °C)] 97.1 °F (36.2 °C)  Pulse:  [] 93  Resp:  [7-20] 15  SpO2:  [77 %-100 %] 97 %  BP: ()/() 90/54     Weight: (!) 139.1 kg (306 lb 10.2 oz)  Body mass index is 54.32 kg/m².      Intake/Output Summary (Last 24 hours) at 8/28/2022 1327  Last data filed at 8/27/2022 1623  Gross per 24 hour   Intake 100 ml   Output --   Net 100 ml       Physical Exam  Vitals and nursing note reviewed.   Constitutional:       Appearance: She is morbidly obese.   HENT:      Head: Atraumatic.      Right Ear: External ear normal.      Left Ear: External ear normal.      Mouth/Throat:      Mouth: Mucous membranes are moist.   Eyes:      Extraocular Movements: Extraocular movements intact.      Conjunctiva/sclera: Conjunctivae normal.   Cardiovascular:      Rate and Rhythm: Normal rate and regular rhythm.      Heart sounds: Normal heart sounds.   Pulmonary:      Breath sounds: No wheezing.   Abdominal:      General: Bowel sounds are normal.      Palpations: Abdomen is soft.   Musculoskeletal:      Cervical back: Normal range of motion.      Comments: Left amputation    Skin:     Comments: Sacral, right heel, amputation site with wounds   Neurological:      Mental Status: She is lethargic.       Vents:  Oxygen  Concentration (%): 40 (08/27/22 1600)    Lines/Drains/Airways       Central Venous Catheter Line  Duration                  Hemodialysis Catheter left subclavian -- days              Peripheral Intravenous Line  Duration                  Peripheral IV - Single Lumen 08/24/22 2130 20 G Right Shoulder 3 days         Peripheral IV - Single Lumen 08/28/22 1000 22 G Posterior;Right Wrist <1 day                    Significant Labs:    CBC/Anemia Profile:  Recent Labs   Lab 08/27/22  0538   WBC 15.24*   HGB 8.6*   HCT 27.7*      MCV 99.3*   RDW 17.7*        Chemistries:  Recent Labs   Lab 08/27/22  0538   *   K 4.1   CL 98   CO2 26   BUN 25*   CREATININE 6.65*   CALCIUM 9.0   ALBUMIN 1.9*   PROT 5.9*   BILITOT 0.4   ALKPHOS 76   ALT 6*   AST 6*   MG 1.7       All pertinent labs within the past 24 hours have been reviewed.    Significant Imaging:  I have reviewed all pertinent imaging results/findings within the past 24 hours.    Assessment/Plan:     * Infection of amputation stump  Wound care - cultures reviewed - sensitive to Azactam     Acute hypercapnic respiratory failure  Hx ROXANN, OHS -- needs to use bipap every night   Pt placed on Bipap this morning --- ABGs returned - 7.27/62/150   - will change settings and repeat labs in 1 hour         Hypotension  BP very labile -- seems to get worse after dialysis - started on Levophed and transferred to [facility] last night  - midodrine increased yesterday      Atrial fibrillation, controlled  Rate controlled- continue eliquis    Pressure injury of buttock, unstageable  Wound care     Type 2 diabetes mellitus  Glucose <180    ESRD on dialysis  TTS     Deep vein thrombosis (DVT) of lower extremity  Dopplers negative here     Diabetic ulcer of right heel associated with type 2 diabetes mellitus  Wound care       This includes 32 minutes of critical care time spent evaluating and managing patient. This includes time spent at bedside, reviewing labs, data, xrays and  monitoring for acute decompensation.              Trini Valdes, DARIN-ACNP  Pulmonology  [facility]

## 2022-08-28 NOTE — NURSING
1745 Rec'd pt from 1W 159 on bariatric bed. Alert and oriented X 3. Pt transferred to unit with c/o low blood pressure of 58/43 while on the floor. Dr. Espinosa, Nursing Supervisor, and primary nurse in attendance. O2 @ 4L per nasal cannula. O2 sat 99%. Hemodialysis catheter noted to left chest wall. Old scars noted to right lower quad of abdomen. Dsg noted to sacral, right leg, and left AKA. 20g INT noted to right shoulder. After receiving pt to unit bp 108/78. O2 sat @ 98%. Temp 97.2. Will continue to monitor and report off to oncoming shift.

## 2022-08-28 NOTE — ASSESSMENT & PLAN NOTE
BP very labile -- seems to get worse after dialysis - started on Levophed and transferred to [facility] last night  - midodrine increased yesterday

## 2022-08-28 NOTE — ASSESSMENT & PLAN NOTE
Hx ROXANN, OHS -- needs to use bipap every night   Pt placed on Bipap this morning --- ABGs returned - 7.27/62/150   - will change settings and repeat labs in 1 hour

## 2022-08-28 NOTE — SUBJECTIVE & OBJECTIVE
Interval History: lethargic on exam. Placed on bipap this morning. ABGs pending.. BP stable on levo     Objective:     Vital Signs (Most Recent):  Temp: 97.1 °F (36.2 °C) (08/28/22 1100)  Pulse: 93 (08/28/22 1215)  Resp: 15 (08/28/22 1215)  BP: (!) 90/54 (08/28/22 1215)  SpO2: 97 % (08/28/22 1200)   Vital Signs (24h Range):  Temp:  [97.1 °F (36.2 °C)-98.3 °F (36.8 °C)] 97.1 °F (36.2 °C)  Pulse:  [] 93  Resp:  [7-20] 15  SpO2:  [77 %-100 %] 97 %  BP: ()/() 90/54     Weight: (!) 139.1 kg (306 lb 10.2 oz)  Body mass index is 54.32 kg/m².      Intake/Output Summary (Last 24 hours) at 8/28/2022 1327  Last data filed at 8/27/2022 1623  Gross per 24 hour   Intake 100 ml   Output --   Net 100 ml       Physical Exam  Vitals and nursing note reviewed.   Constitutional:       Appearance: She is morbidly obese.   HENT:      Head: Atraumatic.      Right Ear: External ear normal.      Left Ear: External ear normal.      Mouth/Throat:      Mouth: Mucous membranes are moist.   Eyes:      Extraocular Movements: Extraocular movements intact.      Conjunctiva/sclera: Conjunctivae normal.   Cardiovascular:      Rate and Rhythm: Normal rate and regular rhythm.      Heart sounds: Normal heart sounds.   Pulmonary:      Breath sounds: No wheezing.   Abdominal:      General: Bowel sounds are normal.      Palpations: Abdomen is soft.   Musculoskeletal:      Cervical back: Normal range of motion.      Comments: Left amputation    Skin:     Comments: Sacral, right heel, amputation site with wounds   Neurological:      Mental Status: She is lethargic.       Vents:  Oxygen Concentration (%): 40 (08/27/22 1600)    Lines/Drains/Airways       Central Venous Catheter Line  Duration                  Hemodialysis Catheter left subclavian -- days              Peripheral Intravenous Line  Duration                  Peripheral IV - Single Lumen 08/24/22 2130 20 G Right Shoulder 3 days         Peripheral IV - Single Lumen 08/28/22 1000 22  G Posterior;Right Wrist <1 day                    Significant Labs:    CBC/Anemia Profile:  Recent Labs   Lab 08/27/22  0538   WBC 15.24*   HGB 8.6*   HCT 27.7*      MCV 99.3*   RDW 17.7*        Chemistries:  Recent Labs   Lab 08/27/22  0538   *   K 4.1   CL 98   CO2 26   BUN 25*   CREATININE 6.65*   CALCIUM 9.0   ALBUMIN 1.9*   PROT 5.9*   BILITOT 0.4   ALKPHOS 76   ALT 6*   AST 6*   MG 1.7       All pertinent labs within the past 24 hours have been reviewed.    Significant Imaging:  I have reviewed all pertinent imaging results/findings within the past 24 hours.

## 2022-08-28 NOTE — NURSING
KARELY Trinidad notified of ABG results. She states she will put orders in for bipap changes and to repeat abgs in one hour after changes.

## 2022-08-29 PROBLEM — E66.01 MORBID OBESITY WITH BMI OF 50.0-59.9, ADULT: Status: ACTIVE | Noted: 2022-08-29

## 2022-08-29 LAB
ANION GAP SERPL CALCULATED.3IONS-SCNC: 11 MMOL/L (ref 7–16)
BUN SERPL-MCNC: 25 MG/DL (ref 7–18)
BUN/CREAT SERPL: 4 (ref 6–20)
CALCIUM SERPL-MCNC: 9.3 MG/DL (ref 8.5–10.1)
CHLORIDE SERPL-SCNC: 98 MMOL/L (ref 98–107)
CO2 SERPL-SCNC: 27 MMOL/L (ref 21–32)
CREAT SERPL-MCNC: 6.67 MG/DL (ref 0.55–1.02)
EGFR (NO RACE VARIABLE) (RUSH/TITUS): 6 ML/MIN/1.73M²
GLUCOSE SERPL-MCNC: 115 MG/DL (ref 74–106)
GLUCOSE SERPL-MCNC: 117 MG/DL (ref 70–105)
GLUCOSE SERPL-MCNC: 136 MG/DL (ref 70–105)
GLUCOSE SERPL-MCNC: 148 MG/DL (ref 70–105)
GLUCOSE SERPL-MCNC: 196 MG/DL (ref 70–105)
MICROORGANISM SPEC CULT: ABNORMAL
POTASSIUM SERPL-SCNC: 4.2 MMOL/L (ref 3.5–5.1)
SODIUM SERPL-SCNC: 132 MMOL/L (ref 136–145)

## 2022-08-29 PROCEDURE — 99233 SBSQ HOSP IP/OBS HIGH 50: CPT | Mod: ,,, | Performed by: INTERNAL MEDICINE

## 2022-08-29 PROCEDURE — 94761 N-INVAS EAR/PLS OXIMETRY MLT: CPT

## 2022-08-29 PROCEDURE — 11042 DBRDMT SUBQ TIS 1ST 20SQCM/<: CPT | Mod: ,,, | Performed by: NURSE PRACTITIONER

## 2022-08-29 PROCEDURE — 25000003 PHARM REV CODE 250: Performed by: FAMILY MEDICINE

## 2022-08-29 PROCEDURE — 11042 DEBRIDEMENT: ICD-10-PCS | Mod: ,,, | Performed by: NURSE PRACTITIONER

## 2022-08-29 PROCEDURE — 11045 DEBRIDEMENT: ICD-10-PCS | Mod: ,,, | Performed by: NURSE PRACTITIONER

## 2022-08-29 PROCEDURE — 27000940

## 2022-08-29 PROCEDURE — 87070 CULTURE OTHR SPECIMN AEROBIC: CPT | Performed by: NURSE PRACTITIONER

## 2022-08-29 PROCEDURE — 36415 COLL VENOUS BLD VENIPUNCTURE: CPT | Performed by: FAMILY MEDICINE

## 2022-08-29 PROCEDURE — 27000190 HC CPAP FULL FACE MASK W/VALVE

## 2022-08-29 PROCEDURE — S0073 INJECTION, AZTREONAM, 500 MG: HCPCS | Performed by: FAMILY MEDICINE

## 2022-08-29 PROCEDURE — 11045 DBRDMT SUBQ TISS EACH ADDL: CPT | Mod: ,,, | Performed by: NURSE PRACTITIONER

## 2022-08-29 PROCEDURE — 87075 CULTR BACTERIA EXCEPT BLOOD: CPT | Performed by: NURSE PRACTITIONER

## 2022-08-29 PROCEDURE — 99900035 HC TECH TIME PER 15 MIN (STAT)

## 2022-08-29 PROCEDURE — 97165 OT EVAL LOW COMPLEX 30 MIN: CPT

## 2022-08-29 PROCEDURE — 27000221 HC OXYGEN, UP TO 24 HOURS

## 2022-08-29 PROCEDURE — 25000003 PHARM REV CODE 250: Performed by: NURSE PRACTITIONER

## 2022-08-29 PROCEDURE — 27000934

## 2022-08-29 PROCEDURE — 80048 BASIC METABOLIC PNL TOTAL CA: CPT | Performed by: FAMILY MEDICINE

## 2022-08-29 PROCEDURE — 82962 GLUCOSE BLOOD TEST: CPT

## 2022-08-29 PROCEDURE — 63600175 PHARM REV CODE 636 W HCPCS: Performed by: FAMILY MEDICINE

## 2022-08-29 PROCEDURE — 11000008

## 2022-08-29 PROCEDURE — 99233 PR SUBSEQUENT HOSPITAL CARE,LEVL III: ICD-10-PCS | Mod: ,,, | Performed by: INTERNAL MEDICINE

## 2022-08-29 PROCEDURE — 27000937

## 2022-08-29 RX ADMIN — MUPIROCIN: 20 OINTMENT TOPICAL at 09:08

## 2022-08-29 RX ADMIN — COLLAGENASE SANTYL: 250 OINTMENT TOPICAL at 09:08

## 2022-08-29 RX ADMIN — MEGESTROL ACETATE 40 MG: 40 TABLET ORAL at 07:08

## 2022-08-29 RX ADMIN — AZTREONAM 1000 MG: 1 INJECTION, POWDER, LYOPHILIZED, FOR SOLUTION INTRAMUSCULAR; INTRAVENOUS at 09:08

## 2022-08-29 RX ADMIN — INSULIN DETEMIR 10 UNITS: 100 INJECTION, SOLUTION SUBCUTANEOUS at 09:08

## 2022-08-29 RX ADMIN — Medication 5000 UNITS: at 09:08

## 2022-08-29 RX ADMIN — ATORVASTATIN CALCIUM 40 MG: 40 TABLET, FILM COATED ORAL at 09:08

## 2022-08-29 RX ADMIN — PANTOPRAZOLE SODIUM 40 MG: 40 TABLET, DELAYED RELEASE ORAL at 09:08

## 2022-08-29 RX ADMIN — GABAPENTIN 200 MG: 100 CAPSULE ORAL at 09:08

## 2022-08-29 RX ADMIN — NOREPINEPHRINE BITARTRATE 0.04 MCG/KG/MIN: 1 INJECTION, SOLUTION, CONCENTRATE INTRAVENOUS at 05:08

## 2022-08-29 RX ADMIN — ASPIRIN 81 MG: 81 TABLET, COATED ORAL at 09:08

## 2022-08-29 RX ADMIN — MIDODRINE HYDROCHLORIDE 10 MG: 5 TABLET ORAL at 09:08

## 2022-08-29 RX ADMIN — HYDROCODONE BITARTRATE AND ACETAMINOPHEN 1 TABLET: 10; 325 TABLET ORAL at 07:08

## 2022-08-29 RX ADMIN — NOREPINEPHRINE BITARTRATE 0.04 MCG/KG/MIN: 1 INJECTION, SOLUTION, CONCENTRATE INTRAVENOUS at 09:08

## 2022-08-29 RX ADMIN — ALLOPURINOL 100 MG: 100 TABLET ORAL at 09:08

## 2022-08-29 RX ADMIN — LEVOTHYROXINE SODIUM 100 MCG: 100 TABLET ORAL at 07:08

## 2022-08-29 RX ADMIN — MIDODRINE HYDROCHLORIDE 10 MG: 5 TABLET ORAL at 03:08

## 2022-08-29 RX ADMIN — ESCITALOPRAM OXALATE 10 MG: 10 TABLET ORAL at 09:08

## 2022-08-29 RX ADMIN — Medication 3 MG: at 09:08

## 2022-08-29 RX ADMIN — APIXABAN 2.5 MG: 2.5 TABLET, FILM COATED ORAL at 09:08

## 2022-08-29 RX ADMIN — SENNOSIDES AND DOCUSATE SODIUM 1 TABLET: 8.6; 5 TABLET ORAL at 09:08

## 2022-08-29 RX ADMIN — FOLIC ACID 1000 MCG: 1 TABLET ORAL at 07:08

## 2022-08-29 NOTE — PROGRESS NOTES
[facility]  Wound Care  Progress Note    Patient Name: Michelle Nunes  MRN: [id number]  Admission Date: 8/27/2022  Attending Physician: Donis Shi DO    Past Medical History:   Diagnosis Date    A-fib     Blind right eye     Diabetes mellitus     ESRD (end stage renal disease) on dialysis     GERD (gastroesophageal reflux disease)     Gout, unspecified     Heart attack     HTN (hypertension)     Kidney failure     Neuropathy         Subjective:     HPI:  Michelle Nunes is a 67 yo female with diabetic ulcer to right heel, dehisced surgical wound to left AKA, and pressure injury to buttock. She presented to emergency department with right lower extremity pain. Necrotic tissue to right heel, maceration periwound. X-ray, Plantar greater than posterior calcaneal spurring.  No acute fracture or dislocation.  Atherosclerotic calcification demonstrated. Bedside debridement and cultures today. Strong odor noted to wound. Pt had left AKA on 5-, Dr. Cross at [facility]. Past medical history includes hypertension, ESRD on hemodialysis, atrial fibrillation,  and diabetes mellitus. She was hospitalized on 7/2/2022 with infection of the left AKA.  She was on IV antibiotics and  Wound VAC was placed. Reports she was diagnosed with DVT of her RLE at [facility] on 8/18. Venous doppler on 8/24 is negative for DVT. Wound healing is complicated by diabetes, a-fib, limited mobility, infection, anemia, and obesity.        Review of Systems   Constitutional:  Positive for activity change. Negative for chills and fever.   Respiratory:  Negative for chest tightness and shortness of breath.    Cardiovascular:  Positive for leg swelling. Negative for chest pain and palpitations.   Musculoskeletal:  Positive for arthralgias and joint swelling.   Skin:  Positive for wound.        wound   Neurological:  Positive for weakness.   Psychiatric/Behavioral:  Negative for agitation, behavioral problems,  confusion and self-injury.    Objective:     Vital Signs (Most Recent):  Temp: 97.4 °F (36.3 °C) (08/29/22 0400)  Pulse: 100 (08/29/22 0630)  Resp: 10 (08/29/22 0630)  BP: (!) 107/59 (08/29/22 0530)  SpO2: (!) 94 % (08/29/22 0630)   Vital Signs (24h Range):  Temp:  [97.4 °F (36.3 °C)-97.9 °F (36.6 °C)] 97.4 °F (36.3 °C)  Pulse:  [] 100  Resp:  [9-23] 10  SpO2:  [86 %-100 %] 94 %  BP: ()/() 107/59     Weight: (!) 139.1 kg (306 lb 10.2 oz)  Body mass index is 54.32 kg/m².  No data recorded    Physical Exam  Vitals reviewed.   Constitutional:       Appearance: She is obese.   HENT:      Head: Normocephalic.   Cardiovascular:      Rate and Rhythm: Normal rate and regular rhythm.   Pulmonary:      Effort: Pulmonary effort is normal.   Musculoskeletal:         General: Swelling, tenderness and deformity present.      Right lower leg: Edema present.      Comments: Left AKA   Skin:     Findings: Erythema present.      Comments: Wound, see LDA for photo/measurements   Neurological:      Mental Status: She is alert. Mental status is at baseline.      Motor: Weakness present.      Gait: Gait abnormal.             Altered Skin Integrity 08/24/22 Right Heel Ulceration (Active)   08/24/22    Altered Skin Integrity Present on Admission: yes   Side: Right   Orientation:    Location: Heel   Wound Number:    Is this injury device related?:    Primary Wound Type: Ulceration   Description of Altered Skin Integrity:    Ankle-Brachial Index:    Pulses:    Removal Indication and Assessment:    Wound Outcome:    (Retired) Wound Length (cm):    (Retired) Wound Width (cm):    (Retired) Depth (cm):    Wound Description (Comments):    Removal Indications:    Wound Image     08/29/22 1130   Description of Altered Skin Integrity Full thickness tissue loss. Base is covered by slough and/or eschar in the wound bed 08/30/22 1130   Dressing Appearance Intact 08/31/22 0000   Drainage Amount Scant 08/30/22 1130   Drainage  Characteristics/Odor Serous;[place] 08/30/22 1130   Appearance Maroon;Tan;Eschar;Moist;Black 08/30/22 1130   Tissue loss description Not applicable 08/30/22 1130   Black (%), Wound Tissue Color 80 % 08/29/22 1130   Red (%), Wound Tissue Color 0 % 08/29/22 1130   Yellow (%), Wound Tissue Color 20 % 08/29/22 1130   Periwound Area Moist 08/30/22 1130   Wound Edges Defined;Open 08/30/22 1130   Wound Length (cm) 5 cm 08/29/22 1130   Wound Width (cm) 5 cm 08/29/22 1130   Wound Depth (cm) 0 cm 08/29/22 1130   Wound Volume (cm^3) 0 cm^3 08/29/22 1130   Wound Surface Area (cm^2) 25 cm^2 08/29/22 1130   Care Cleansed with:;Soap and water;Wound cleanser;Applied:;Removed:;Skin Barrier;Moisturizing agent 08/30/22 1130   Dressing Removed;Applied;Changed;Collagen;Gauze, wet to moist;Gauze;Rolled gauze 08/30/22 1130   Periwound Care Moisture barrier applied;Skin barrier film applied 08/30/22 1130   Dressing Change Due 08/31/22 08/30/22 1130   Number of days: 7            Altered Skin Integrity 08/24/22 Left Buttocks Ulceration (Active)   08/24/22    Altered Skin Integrity Present on Admission: yes   Side: Left   Orientation:    Location: Buttocks   Wound Number:    Is this injury device related?:    Primary Wound Type: Ulceration   Description of Altered Skin Integrity:    Ankle-Brachial Index:    Pulses:    Removal Indication and Assessment:    Wound Outcome:    (Retired) Wound Length (cm):    (Retired) Wound Width (cm):    (Retired) Depth (cm):    Wound Description (Comments):    Removal Indications:    Wound Image    08/29/22 1130   Description of Altered Skin Integrity Full thickness tissue loss. Subcutaneous fat may be visible but bone, tendon or muscle are not exposed 08/29/22 1130   Dressing Appearance Intact 08/31/22 0000   Drainage Amount Small 08/30/22 1130   Drainage Characteristics/Odor Serosanguineous 08/30/22 1130   Appearance Pink;Tan;Slough;Moist 08/30/22 1130   Tissue loss description Full thickness 08/30/22 1139    Black (%), Wound Tissue Color 0 % 08/29/22 1130   Red (%), Wound Tissue Color 50 % 08/29/22 1130   Yellow (%), Wound Tissue Color 50 % 08/29/22 1130   Periwound Area Excoriated;[place] 08/30/22 1130   Wound Edges Undefined 08/30/22 1130   Wound Length (cm) 4 cm 08/29/22 1130   Wound Width (cm) 3 cm 08/29/22 1130   Wound Depth (cm) 0.2 cm 08/29/22 1130   Wound Volume (cm^3) 2.4 cm^3 08/29/22 1130   Wound Surface Area (cm^2) 12 cm^2 08/29/22 1130   Care Cleansed with:;Soap and water;Wound cleanser;Applied:;Removed:;Skin Barrier 08/30/22 1130   Dressing Removed;Applied;Changed;Hydrocolloid 08/30/22 1130   Dressing Change Due 08/31/22 08/30/22 1130   Number of days: 7            Altered Skin Integrity 08/25/22 0206 Right Buttocks Ulceration (Active)   08/25/22 0206   Altered Skin Integrity Present on Admission: yes   Side: Right   Orientation:    Location: Buttocks   Wound Number:    Is this injury device related?:    Primary Wound Type: Ulceration   Description of Altered Skin Integrity:    Ankle-Brachial Index:    Pulses:    Removal Indication and Assessment:    Wound Outcome:    (Retired) Wound Length (cm):    (Retired) Wound Width (cm):    (Retired) Depth (cm):    Wound Description (Comments):    Removal Indications:    Wound Image    08/29/22 1130   Description of Altered Skin Integrity Full thickness tissue loss. Base is covered by slough and/or eschar in the wound bed 08/29/22 1130   Dressing Appearance Intact 08/31/22 0000   Drainage Amount Scant 08/30/22 1130   Drainage Characteristics/Odor Serosanguineous;No odor 08/30/22 1130   Appearance Red;Black;Eschar;Moist 08/30/22 1130   Tissue loss description Not applicable 08/30/22 1130   Black (%), Wound Tissue Color 70 % 08/29/22 1130   Red (%), Wound Tissue Color 30 % 08/29/22 1130   Yellow (%), Wound Tissue Color 0 % 08/29/22 1130   Periwound Area Dry 08/30/22 1130   Wound Edges Defined;Open 08/30/22 1130   Wound Length (cm) 4 cm 08/29/22 1130   Wound Width (cm)  4.5 cm 08/29/22 1130   Wound Depth (cm) 0.2 cm 08/29/22 1130   Wound Volume (cm^3) 3.6 cm^3 08/29/22 1130   Wound Surface Area (cm^2) 18 cm^2 08/29/22 1130   Care Cleansed with:;Soap and water;Wound cleanser;Applied:;Removed:;Skin Barrier 08/30/22 1130   Dressing Removed;Applied;Changed;Hydrocolloid 08/30/22 1130   Dressing Change Due 08/30/22 08/30/22 1130   Number of days: 6            Altered Skin Integrity 08/27/22 1745 Left anterior Thigh (Active)   08/27/22 1745   Altered Skin Integrity Present on Admission:    Side: Left   Orientation: anterior   Location: Thigh   Wound Number:    Is this injury device related?:    Primary Wound Type:    Description of Altered Skin Integrity:    Ankle-Brachial Index:    Pulses:    Removal Indication and Assessment:    Wound Outcome:    (Retired) Wound Length (cm):    (Retired) Wound Width (cm):    (Retired) Depth (cm):    Wound Description (Comments):    Removal Indications:    Dressing Appearance Open to air 08/30/22 0730   Drainage Amount None 08/30/22 0730   Appearance Pink 08/30/22 0730   Number of days: 4            Altered Skin Integrity 08/29/22 1130 posterior Perineum Incontinence associated dermatitis (Active)   08/29/22 1130   Altered Skin Integrity Present on Admission: yes   Side:    Orientation: posterior   Location: Perineum   Wound Number:    Is this injury device related?: No   Primary Wound Type: Incontinence   Description of Altered Skin Integrity:    Ankle-Brachial Index:    Pulses:    Removal Indication and Assessment:    Wound Outcome:    (Retired) Wound Length (cm):    (Retired) Wound Width (cm):    (Retired) Depth (cm):    Wound Description (Comments):    Removal Indications:    Wound Image    08/29/22 1130   Dressing Appearance Open to air 08/30/22 1130   Drainage Amount Scant 08/30/22 1130   Drainage Characteristics/Odor Serosanguineous;No odor 08/30/22 1130   Appearance Red;Moist 08/30/22 1130   Tissue loss description Partial thickness 08/30/22 1130    Black (%), Wound Tissue Color 0 % 08/29/22 1130   Red (%), Wound Tissue Color 99 % 08/29/22 1130   Yellow (%), Wound Tissue Color 0 % 08/29/22 1130   Periwound Area Excoriated;Moist 08/30/22 1130   Wound Edges Undefined 08/30/22 1130   Care Cleansed with:;Soap and water;Wound cleanser;Applied:;Removed:;Skin Barrier 08/30/22 1130   Dressing Removed;Applied;Changed;Hydrocolloid 08/30/22 1130   Dressing Change Due 08/31/22 08/30/22 1130   Number of days: 2            Altered Skin Integrity 08/29/22 1130 Right posterior;medial Buttocks Full thickness tissue loss. Subcutaneous fat may be visible but bone, tendon or muscle are not exposed (Active)   08/29/22 1130   Altered Skin Integrity Present on Admission: yes   Side: Right   Orientation: posterior;medial   Location: Buttocks   Wound Number:    Is this injury device related?: No   Primary Wound Type:    Description of Altered Skin Integrity: Full thickness tissue loss. Subcutaneous fat may be visible but bone, tendon or muscle are not exposed   Ankle-Brachial Index:    Pulses:    Removal Indication and Assessment:    Wound Outcome:    (Retired) Wound Length (cm):    (Retired) Wound Width (cm):    (Retired) Depth (cm):    Wound Description (Comments):    Removal Indications:    Wound Image   08/29/22 1130   Description of Altered Skin Integrity Full thickness tissue loss. Subcutaneous fat may be visible but bone, tendon or muscle are not exposed 08/30/22 1130   Dressing Appearance Dry;Intact 08/30/22 2000   Drainage Amount None 08/30/22 1130   Appearance Pink;Dry 08/30/22 1130   Tissue loss description Full thickness 08/30/22 1130   Black (%), Wound Tissue Color 0 % 08/29/22 1130   Red (%), Wound Tissue Color 95 % 08/29/22 1130   Yellow (%), Wound Tissue Color 5 % 08/29/22 1130   Periwound Area Dry 08/30/22 1130   Wound Edges Defined 08/30/22 1130   Wound Length (cm) 1.5 cm 08/29/22 1130   Wound Width (cm) 1 cm 08/29/22 1130   Wound Depth (cm) 0.1 cm 08/29/22 1130    Wound Volume (cm^3) 0.15 cm^3 08/29/22 1130   Wound Surface Area (cm^2) 1.5 cm^2 08/29/22 1130   Care Cleansed with:;Soap and water;Wound cleanser;Applied:;Removed:;Skin Barrier 08/30/22 1130   Dressing Removed;Applied;Changed;Hydrocolloid 08/30/22 1130   Dressing Change Due 08/31/22 08/30/22 1130   Number of days: 2            Altered Skin Integrity 08/29/22 1130 Left posterior;proximal Buttocks Partial thickness tissue loss. Shallow open ulcer with a red or pink wound bed, without slough. Intact or Open/Ruptured Serum-filled blister. (Active)   08/29/22 1130   Altered Skin Integrity Present on Admission: yes   Side: Left   Orientation: posterior;proximal   Location: Buttocks   Wound Number:    Is this injury device related?: No   Primary Wound Type:    Description of Altered Skin Integrity: Partial thickness tissue loss. Shallow open ulcer with a red or pink wound bed, without slough. Intact or Open/Ruptured Serum-filled blister.   Ankle-Brachial Index:    Pulses:    Removal Indication and Assessment:    Wound Outcome:    (Retired) Wound Length (cm):    (Retired) Wound Width (cm):    (Retired) Depth (cm):    Wound Description (Comments):    Removal Indications:    Wound Image    08/29/22 1130   Description of Altered Skin Integrity Partial thickness tissue loss. Shallow open ulcer with a red or pink wound bed, without slough. Intact or Open/Ruptured Serum-filled blister. 08/29/22 1130   Dressing Appearance Dry;Intact 08/30/22 2000   Drainage Amount None 08/30/22 1130   Appearance Pink;Dry 08/30/22 1130   Tissue loss description Partial thickness 08/30/22 1130   Black (%), Wound Tissue Color 0 % 08/29/22 1130   Red (%), Wound Tissue Color 99 % 08/29/22 1130   Yellow (%), Wound Tissue Color 0 % 08/29/22 1130   Periwound Area Dry 08/30/22 1130   Wound Edges Defined 08/30/22 1130   Wound Length (cm) 1.5 cm 08/29/22 1130   Wound Width (cm) 1 cm 08/29/22 1130   Wound Depth (cm) 0 cm 08/29/22 1130   Wound Volume (cm^3)  0 cm^3 08/29/22 1130   Wound Surface Area (cm^2) 1.5 cm^2 08/29/22 1130   Care Soap and water 08/30/22 1130   Number of days: 2            Altered Skin Integrity 08/29/22 1130 Left anterior other (see comments) Ulceration (Active)   08/29/22 1130   Altered Skin Integrity Present on Admission: yes   Side: Left   Orientation: anterior   Location: other (see comments)   Wound Number:    Is this injury device related?: No   Primary Wound Type: Ulceration   Description of Altered Skin Integrity:    Ankle-Brachial Index:    Pulses:    Removal Indication and Assessment:    Wound Outcome:    (Retired) Wound Length (cm):    (Retired) Wound Width (cm):    (Retired) Depth (cm):    Wound Description (Comments):    Removal Indications:    Wound Image    08/29/22 1130   Dressing Appearance Moist drainage 08/30/22 1130   Drainage Amount Small 08/30/22 1130   Drainage Characteristics/Odor Serosanguineous;No odor 08/30/22 1130   Appearance Red;Moist;[place]an[place] 08/30/22 1130   Tissue loss description Full thickness 08/30/22 1130   Black (%), Wound Tissue Color 0 % 08/29/22 1130   Red (%), Wound Tissue Color 99 % 08/29/22 1130   Yellow (%), Wound Tissue Color 0 % 08/29/22 1130   Periwound Area Excoriated;Moist 08/30/22 1130   Wound Edges Undefined;Open 08/30/22 1130   Wound Length (cm) 3 cm 08/29/22 1130   Wound Width (cm) 15 cm 08/29/22 1130   Wound Depth (cm) 1 cm 08/29/22 1130   Wound Volume (cm^3) 45 cm^3 08/29/22 1130   Wound Surface Area (cm^2) 45 cm^2 08/29/22 1130   Care Cleansed with:;Soap and water;Wound cleanser;Applied:;Removed:;Skin Barrier;Moisturizing agent 08/30/22 1130   Dressing Removed;Applied;Changed;Gauze, wet to moist;Gauze;Other (comment) 08/30/22 1130   Periwound Care Moisture barrier applied;Skin barrier film applied 08/30/22 1130   Dressing Change Due 08/31/22 08/30/22 1130   Number of days: 2            Altered Skin Integrity 08/29/22 1130 Left posterior;distal Thigh Abrasion(s) (Active)   08/29/22 1130    Altered Skin Integrity Present on Admission: yes   Side: Left   Orientation: posterior;distal   Location: Thigh   Wound Number:    Is this injury device related?: No   Primary Wound Type: Abrasion(s)   Description of Altered Skin Integrity:    Ankle-Brachial Index:    Pulses:    Removal Indication and Assessment:    Wound Outcome:    (Retired) Wound Length (cm):    (Retired) Wound Width (cm):    (Retired) Depth (cm):    Wound Description (Comments):    Removal Indications:    Wound Image    08/29/22 1130   Description of Altered Skin Integrity Partial thickness tissue loss. Shallow open ulcer with a red or pink wound bed, without slough. Intact or Open/Ruptured Serum-filled blister. 08/29/22 1130   Dressing Appearance Open to air 08/30/22 1130   Drainage Amount None 08/30/22 1130   Drainage Characteristics/Odor Serosanguineous 08/29/22 1130   Appearance Red 08/30/22 1130   Tissue loss description Full thickness 08/29/22 1130   Black (%), Wound Tissue Color 0 % 08/29/22 1130   Red (%), Wound Tissue Color 100 % 08/29/22 1130   Yellow (%), Wound Tissue Color 0 % 08/29/22 1130   Periwound Area Dry 08/30/22 1130   Care Cleansed with:;Soap and water 08/30/22 1130   Number of days: 2            Altered Skin Integrity 08/29/22 1130 Right medial Buttocks Full thickness tissue loss. Subcutaneous fat may be visible but bone, tendon or muscle are not exposed (Active)   08/29/22 1130   Altered Skin Integrity Present on Admission: yes   Side: Right   Orientation: medial   Location: Buttocks   Wound Number:    Is this injury device related?:    Primary Wound Type:    Description of Altered Skin Integrity: Full thickness tissue loss. Subcutaneous fat may be visible but bone, tendon or muscle are not exposed   Ankle-Brachial Index:    Pulses:    Removal Indication and Assessment:    Wound Outcome:    (Retired) Wound Length (cm):    (Retired) Wound Width (cm):    (Retired) Depth (cm):    Wound Description (Comments):    Removal  Indications:    Wound Image    08/29/22 1130   Description of Altered Skin Integrity Full thickness tissue loss. Subcutaneous fat may be visible but bone, tendon or muscle are not exposed 08/30/22 1130   Dressing Appearance Intact 08/31/22 0000   Drainage Amount Scant 08/30/22 1130   Drainage Characteristics/Odor Serosanguineous;No odor 08/30/22 1130   Appearance Pink;Tan;Slough;Moist 08/30/22 1130   Tissue loss description Full thickness 08/30/22 1130   Black (%), Wound Tissue Color 0 % 08/29/22 1130   Red (%), Wound Tissue Color 50 % 08/29/22 1130   Yellow (%), Wound Tissue Color 50 % 08/29/22 1130   Periwound Area Dry 08/30/22 1130   Wound Edges Defined;Open 08/30/22 1130   Wound Length (cm) 2 cm 08/29/22 1130   Wound Width (cm) 1 cm 08/29/22 1130   Wound Depth (cm) 0.5 cm 08/29/22 1130   Wound Volume (cm^3) 1 cm^3 08/29/22 1130   Wound Surface Area (cm^2) 2 cm^2 08/29/22 1130   Care Cleansed with:;Soap and water;Wound cleanser;Applied:;Removed:;Skin Barrier;Moisturizing agent 08/30/22 1130   Dressing Removed;Applied;Changed;Collagen;Gauze 08/30/22 1130   Periwound Care Moisturizer applied;Skin barrier film applied 08/30/22 1130   Dressing Change Due 08/31/22 08/30/22 1130   Number of days: 2         Assessment and Plan    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  Clean with vashe  Apply vashe moistened drawtex to wound bed  Cover with secure wit 4x4s, abd pad, and paper tape  Change daily and PRN for drainage  Cultures pending  IV antibiotics     Pressure injury of buttock, unstageable  Clean wound with vashe  Apply sensicare around wound  Apply santyl (esther thickness) to wound bed, cover with vashe moistened 4x4  Cover and secure with abd pad and paper tape  Change daily and PRN for soilage  Turn every two hours  Low air loss mattress  Keep pressure off wound  TAP system      Diabetic ulcer of right heel associated with type 2 diabetes mellitus  Clean wound  with vashe  Apply sensicare around wound  Apply santyl (esther thickness) to wound bed, cover with vashe moistened 4x4  Cover and secure with 4x4s, oralia, and paper tape  Change daily and PRN for soilage  X-ray today  Bedside debridement today  Cultures pending        Signature:  KARELY Fonseca  Wound Care    Date of encounter: 08/29/2022

## 2022-08-29 NOTE — HOSPITAL COURSE
8/29- Patient is resting comfortably on bipap this am. She is awake, alert, and oriented x3. She is oxygenating adequately. We are going to transition to nasal cannula.  8/30 The patient is now on nasal cannula. She slept with the bipap last night. She remains AxO x 3. She is scheduled for HD this am  8/31- She did not use the bipap overnight due to vomiting. This morning she is confused. ABG pending and I am going to put her back on bipap.She remains on pressor. BP is elevated this am.  9/1- We had to place her back on bipap during the day due to some hypercapnea. She is still on bipap this am and doing well. She is back to her baseline from a mental status standpoint.  9/2: bipap overnight; NC during the day; continues on levophed infusion- BP with questionable accuracy   9/3- continue current tx; levophed has been weaned off at present with adequate BP readings   9/4- biap qHS; levophed remains off with adequate BP- still with fluctuations in readings   9/5- levophed continues to remain off; bipap qHS; BP stable at present; HD today- concern for hypotension after; mood labile; does have some anxiety- will add vistaril   9/6- awake and alert but did have some confusion earlier this am. I am told she did wear her bipap overnight. She is oxygenating adequately.  9/7- She remains hemodynamically stable off of pressors. She used her bipap overnight.  9/9 remains hemodynamically stable off of pressors. HD today. She is doing well with bipap at night and nasal cannula during the day. Will put in floor orders today

## 2022-08-29 NOTE — PT/OT/SLP EVAL
Occupational Therapy   Evaluation    Name: Michelle Nunes  MRN: [id number]  Admitting Diagnosis:  Infection of amputation stump  Recent Surgery: * No surgery found *      Recommendations:     Discharge Recommendations: home with home health  Discharge Equipment Recommendations:  none  Barriers to discharge:  None    Assessment:     Michelle Nunes is a 66 y.o. female with a medical diagnosis of Infection of amputation stump.  She presents with more alert, but still falls asleep easily, some confusion. Performance deficits affecting function: weakness, impaired endurance, impaired self care skills, impaired functional mobility, gait instability, impaired balance, impaired cognition, decreased upper extremity function, pain, decreased ROM, impaired skin, impaired fine motor.      Rehab Prognosis: Good; patient would benefit from acute skilled OT services to address these deficits and reach maximum level of function.       Plan:     Patient to be seen 5 x/week to address the above listed problems via self-care/home management, therapeutic activities, therapeutic exercises  Plan of Care Expires: 09/05/22  Plan of Care Reviewed with: patient    Subjective     Chief Complaint: (R) heel pain  Patient/Family Comments/goals: Pt unable to state.     Occupational Profile:  Living Environment: Pt lives at home with her daughter  Previous level of function: Pt has a marianne lift for transfers, a w/c, and a hospital bed. Pt assisted with her bathing and she was able to feed herself.  Roles and Routines: Pt assists with her care as she is able  Equipment Used at Home:  wound care supplies, wheelchair, power chair, hospital bed  Assistance upon Discharge: Pt will require long term care placement as her family is having difficulty providing her care.    Pain/Comfort:  Pain Rating 1: 0/10  Pain Rating Post-Intervention 1: 0/10    Patients cultural, spiritual, Congregational conflicts given the current situation: no    Objective:      Communicated with: JAY Mendez prior to session.  Patient found HOB elevated with telemetry, blood pressure cuff, peripheral IV, pulse ox (continuous), oxygen upon OT entry to room.    General Precautions: Standard, fall, respiratory   Orthopedic Precautions:N/A   Braces: N/A  Respiratory Status: Nasal cannula, flow 3 L/min    Occupational Performance:    Bed Mobility:    NT    Functional Mobility/Transfers:  NT, pt is dependent for transfers, uses marianne lift    Activities of Daily Living:  Unable- pt is very lethargic and weak with myoclonic jerks, required total (A) for eating her breakfast    Cognitive/Visual Perceptual:  Cognitive/Psychosocial Skills:     -       Oriented to: Person   -       Follows Commands/attention:Inattentive  -       Communication: clear/fluent  -       Safety awareness/insight to disability: impaired   -       Mood/Affect/Coping skills/emotional control: Lethargic    Physical Exam:  Edema:  Moderate (B) hands  Upper Extremity Range of Motion:     -       Right Upper Extremity: AAROM is WFL, AROM appeared to be impaired  -       Left Upper Extremity: AAROM is limited at shoulder, otherwise WFL  Upper Extremity Strength:    -       Right Upper Extremity: impaired 2(+) to 3 /5 MMT  -       Left Upper Extremity: impaired 2/5 MMT   Strength:    -       Right Upper Extremity: impaired  -       Left Upper Extremity: impaired  Fine Motor Coordination:    -       Impaired  Left hand, finger to nose  , Right hand, finger to nose  , Left hand thumb/finger opposition skills  , and Right hand thumb/finger opposition skills    Gross motor coordination:   impaired  Neurological:    -       Pt demonstrates myoclonic jerking (B) UE.    AMPAC 6 Click ADL:  AMPAC Total Score: 7    Treatment & Education:  Pt educated on OT role/POC.   Importance of assisting with self-care activities   All questions/concerns answered within OT scope of practice     Patient left HOB elevated with all lines intact, call  button in reach, and RN notified    GOALS:   Multidisciplinary Problems       Occupational Therapy Goals          Problem: Occupational Therapy    Goal Priority Disciplines Outcome Interventions   Occupational Therapy Goal     OT, PT/OT Ongoing, Progressing    Description: STG:  Pt will perform grooming with setup  Pt will bathe with setup and min(A) for upper body anterior  Pt will perform UE dressing with setup  Pt will perform self- feeding with setup  Pt will perform HEP independently  Pt will tolerate 20 minutes of tx without fatigue      LT.Restore to max I with self care and mobility.                          History:     Past Medical History:   Diagnosis Date    A-fib     Blind right eye     Diabetes mellitus     ESRD (end stage renal disease) on dialysis     GERD (gastroesophageal reflux disease)     Gout, unspecified     Heart attack     HTN (hypertension)     Kidney failure     Neuropathy          Past Surgical History:   Procedure Laterality Date    CHOLECYSTECTOMY      HYSTERECTOMY      LEG AMPUTATION THROUGH KNEE Left        Time Tracking:     OT Date of Treatment: 22  OT Start Time: 900  OT Stop Time: 910  OT Total Time (min): 10 min    Billable Minutes:Evaluation moderate complexity    2022

## 2022-08-29 NOTE — PLAN OF CARE
Problem: Occupational Therapy  Goal: Occupational Therapy Goal  Description: STG:  Pt will perform grooming with setup  Pt will bathe with setup and min(A) for upper body anterior  Pt will perform UE dressing with setup  Pt will perform self- feeding with setup  Pt will perform HEP independently  Pt will tolerate 20 minutes of tx without fatigue      LT.Restore to max I with self care and mobility.     Outcome: Ongoing, Progressing   OT low complexity evaluation performed. Evaluation was limited by pt's lethargy and mild confusion. OT will offer 1 week trial to determine pt's response to Ot interventions. OT will treat pt daily 5x/wk for 1 week and extend treatments if pt is able to participate and make progress with OT services.

## 2022-08-29 NOTE — ASSESSMENT & PLAN NOTE
Hx ROXANN, [facility] -- needs to use bipap every night   Pt placed on Bipap this morning --- ABGs returned - 7.27/62/150   - will change settings and repeat labs in 1 hour   8/29 Awake and alert this am. Placing on nasal cannula during the day and bipap at night

## 2022-08-29 NOTE — PLAN OF CARE
Problem: Adult Inpatient Plan of Care  Goal: Plan of Care Review  Outcome: Ongoing, Progressing  Goal: Patient-Specific Goal (Individualized)  Outcome: Ongoing, Progressing  Goal: Absence of Hospital-Acquired Illness or Injury  Outcome: Ongoing, Progressing  Goal: Optimal Comfort and Wellbeing  Outcome: Ongoing, Progressing  Goal: Readiness for Transition of Care  Outcome: Ongoing, Progressing     Problem: Diabetes Comorbidity  Goal: Blood Glucose Level Within Targeted Range  Outcome: Ongoing, Progressing     Problem: Bariatric Environmental Safety  Goal: Safety Maintained with Care  Outcome: Ongoing, Progressing     Problem: Infection  Goal: Absence of Infection Signs and Symptoms  Outcome: Ongoing, Progressing     Problem: Impaired Wound Healing  Goal: Optimal Wound Healing  Outcome: Ongoing, Progressing     Problem: Device-Related Complication Risk (Hemodialysis)  Goal: Safe, Effective Therapy Delivery  Outcome: Ongoing, Progressing     Problem: Hemodynamic Instability (Hemodialysis)  Goal: Effective Tissue Perfusion  Outcome: Ongoing, Progressing     Problem: Infection (Hemodialysis)  Goal: Absence of Infection Signs and Symptoms  Outcome: Ongoing, Progressing     Problem: Fall Injury Risk  Goal: Absence of Fall and Fall-Related Injury  Outcome: Ongoing, Progressing     Problem: Skin Injury Risk Increased  Goal: Skin Health and Integrity  Outcome: Ongoing, Progressing     Problem: Noninvasive Ventilation Acute  Goal: Effective Unassisted Ventilation and Oxygenation  Outcome: Ongoing, Progressing

## 2022-08-29 NOTE — PLAN OF CARE
[facility]  Initial Discharge Assessment       Primary Care Provider: Primary Doctor No    Admission Diagnosis: Hypotension [I95.9]    Admission Date: 8/27/2022  Expected Discharge Date:     Discharge Barriers Identified: Bariatric    Payor: MEDICARE / Plan: MEDICARE PART A & B / Product Type: Government /     Extended Emergency Contact Information  Primary Emergency Contact: Barbi Nunes  Mobile Phone: [phone]  Relation: Daughter  Preferred language: English   needed? No    Discharge Plan A: Skilled Nursing Facility  Discharge Plan B: New Nursing Home placement - [facility] care facility    No Pharmacies Listed    Initial Assessment (most recent)       Adult Discharge Assessment - 08/29/22 1502          Discharge Assessment    Assessment Type Discharge Planning Assessment     Source of Information family     Communicated JUN with patient/caregiver Date not available/Unable to determine     Lives With other (see comments)     Do you expect to return to your current living situation? No     Do you have help at home or someone to help you manage your care at home? Yes     Who are your caregiver(s) and their phone number(s)? Barbi Nunes- daughter [phone]     Prior to hospitilization cognitive status: Alert/Oriented     Current cognitive status: Alert/Oriented     Walking or Climbing Stairs Difficulty ambulation difficulty, requires equipment;ambulation difficulty, dependent     Dressing/Bathing Difficulty bathing difficulty, requires equipment;bathing difficulty, dependent     Home Accessibility wheelchair accessible     Home Layout Able to live on 1st floor     Equipment Currently Used at Home wound care supplies;wheelchair;power chair;hospital bed;oxygen;lift device     Do you currently have service(s) that help you manage your care at home? Yes     Name and Contact number of agency [facility] Medicaid Waiver     Is the pt/caregiver preference to resume  services with current agency Yes     Do you take prescription medications? Yes     Do you have prescription coverage? Yes     Do you have any problems affording any of your prescribed medications? No     Is the patient taking medications as prescribed? yes     Are you on dialysis? Yes     Dialysis Name and Scheduled days [facility] [address] [facility]     Do you take coumadin? Yes     Discharge Plan A Skilled Nursing Facility     Discharge Plan B New Nursing Home placement - care home care facility     DME Needed Upon Discharge  none     Discharge Plan discussed with: Adult children     Discharge Barriers Identified Bariatric                      SS spoke with pt's daughter. Pt lives at home with her niece. Pt has [facility] and Medicaid Waiver sitter that comes 4 days a week. Daughter did not want to set up a password. Informed of IDT meeting and they will try to come. SS will follow

## 2022-08-29 NOTE — HPI
65yo admitted to [facility] from [facility]. Patient with infected left amputation stump, right heel ulcer and sacral ulcer. Wound culture reveals E.Coli and Proteus both of which are sensitive to Azactam. Patient previously on Midodrine outpatient for hypotension. This has been continued on this in [facility] and will continue in [facility]. Patient with ESRD and will continue her Dialysis of TTS. Patient on Eliquis for atrial fibrillation. Patient with ORXANN and uses CPAP at home

## 2022-08-29 NOTE — PROGRESS NOTES
[facility] \[facility]\""  Pulmonology  Progress Note    Patient Name: Michelle Nunes  MRN: [id number]  Admission Date: 8/27/2022  Hospital Length of Stay: 2 days  Code Status: Full Code  Attending Provider: Donis Shi DO  Primary Care Provider: Primary Doctor No   Principal Problem: Infection of amputation stump    Subjective:     Interval History: No acute events overnight. The patient is resting comfortably this am. She is afebrile and vital signs are stable.    Objective:     Vital Signs (Most Recent):  Temp: 97.4 °F (36.3 °C) (08/29/22 0400)  Pulse: 100 (08/29/22 0630)  Resp: 10 (08/29/22 0630)  BP: (!) 107/59 (08/29/22 0530)  SpO2: (!) 94 % (08/29/22 0630)   Vital Signs (24h Range):  Temp:  [97.4 °F (36.3 °C)-97.9 °F (36.6 °C)] 97.4 °F (36.3 °C)  Pulse:  [] 100  Resp:  [9-23] 10  SpO2:  [81 %-100 %] 94 %  BP: ()/() 107/59     Weight: (!) 139.1 kg (306 lb 10.2 oz)  Body mass index is 54.32 kg/m².      Intake/Output Summary (Last 24 hours) at 8/29/2022 1109  Last data filed at 8/28/2022 1700  Gross per 24 hour   Intake 150 ml   Output --   Net 150 ml       Physical Exam  Vitals and nursing note reviewed.   Constitutional:       General: She is not in acute distress.     Appearance: She is obese. She is not ill-appearing.   HENT:      Head: Normocephalic and atraumatic.      Right Ear: External ear normal.      Left Ear: External ear normal.      Mouth/Throat:      Mouth: Mucous membranes are moist.      Pharynx: Oropharynx is clear.   Eyes:      Extraocular Movements: Extraocular movements intact.      Conjunctiva/sclera: Conjunctivae normal.      Pupils: Pupils are equal, round, and reactive to light.   Cardiovascular:      Rate and Rhythm: Regular rhythm. Tachycardia present.      Heart sounds: Normal heart sounds.   Pulmonary:      Effort: No respiratory distress.      Breath sounds: Normal breath sounds. No wheezing or rales.   Abdominal:      General: Bowel  sounds are normal.      Palpations: Abdomen is soft.   Musculoskeletal:         General: Normal range of motion.      Cervical back: Normal range of motion.      Comments: Left amputation    Skin:     General: Skin is warm.      Coloration: Skin is not pale.      Comments: Sacral, right heel, amputation site with wounds   Neurological:      General: No focal deficit present.      Mental Status: She is alert and oriented to person, place, and time. Mental status is at baseline.   Psychiatric:         Behavior: Behavior normal.       Vents:  Oxygen Concentration (%): 30 (08/29/22 0335)    Lines/Drains/Airways       Central Venous Catheter Line  Duration                  Hemodialysis Catheter left subclavian -- days              Peripheral Intravenous Line  Duration                  Peripheral IV - Single Lumen 08/24/22 2130 20 G Right Shoulder 4 days         Peripheral IV - Single Lumen 08/28/22 1000 22 G Posterior;Right Wrist 1 day                    Significant Labs:    CBC/Anemia Profile:  No results for input(s): WBC, HGB, HCT, PLT, MCV, RDW, IRON, FERRITIN, RETIC, FOLATE, [id number], OCCULTBLOOD in the last 48 hours.     Chemistries:  Recent Labs   Lab 08/29/22  0710   *   K 4.2   CL 98   CO2 27   BUN 25*   CREATININE 6.67*   CALCIUM 9.3       All pertinent labs within the past 24 hours have been reviewed.    Significant Imaging:  I have reviewed all pertinent imaging results/findings within the past 24 hours.    Assessment/Plan:     * Infection of amputation stump  Wound care - cultures reviewed - sensitive to Azactam     Morbid obesity with BMI of 50.0-59.9, adult  Complicates all levels of care    Acute hypercapnic respiratory failure  Hx ROXANN, [facility] -- needs to use bipap every night   Pt placed on Bipap this morning --- ABGs returned - 7.27/62/150   - will change settings and repeat labs in 1 hour   8/29 Awake and alert this am. Placing on nasal cannula during the day and bipap at  night      Hypotension  BP very labile -- seems to get worse after dialysis - started on Levophed and transferred to [facility] last night  - midodrine increased yesterday    8/29 BP looks ok this am    Atrial fibrillation, controlled  Rate controlled- continue eliquis    Pressure injury of buttock, unstageable  Continue with wound care    Type 2 diabetes mellitus  Glucose <180  Continue with current care  She is on 10 units of levemir and mild sliding scale    ESRD on dialysis  Continue with dialysis per nephrology  T/TH/ Sat    Deep vein thrombosis (DVT) of lower extremity  Dopplers negative here   She is on eliquis 2.5 bid    Diabetic ulcer of right heel associated with type 2 diabetes mellitus  Continue with wound care                  Donis Shi, DO  Pulmonology  [facility]

## 2022-08-29 NOTE — ASSESSMENT & PLAN NOTE
BP very labile -- seems to get worse after dialysis - started on Levophed and transferred to [facility] last night  - midodrine increased yesterday    8/29 BP looks ok this am

## 2022-08-29 NOTE — SUBJECTIVE & OBJECTIVE
Interval History: No acute events overnight. The patient is resting comfortably this am. She is afebrile and vital signs are stable.    Objective:     Vital Signs (Most Recent):  Temp: 97.4 °F (36.3 °C) (08/29/22 0400)  Pulse: 100 (08/29/22 0630)  Resp: 10 (08/29/22 0630)  BP: (!) 107/59 (08/29/22 0530)  SpO2: (!) 94 % (08/29/22 0630)   Vital Signs (24h Range):  Temp:  [97.4 °F (36.3 °C)-97.9 °F (36.6 °C)] 97.4 °F (36.3 °C)  Pulse:  [] 100  Resp:  [9-23] 10  SpO2:  [81 %-100 %] 94 %  BP: ()/() 107/59     Weight: (!) 139.1 kg (306 lb 10.2 oz)  Body mass index is 54.32 kg/m².      Intake/Output Summary (Last 24 hours) at 8/29/2022 1109  Last data filed at 8/28/2022 1700  Gross per 24 hour   Intake 150 ml   Output --   Net 150 ml       Physical Exam  Vitals and nursing note reviewed.   Constitutional:       General: She is not in acute distress.     Appearance: She is obese. She is not ill-appearing.   HENT:      Head: Normocephalic and atraumatic.      Right Ear: External ear normal.      Left Ear: External ear normal.      Mouth/Throat:      Mouth: Mucous membranes are moist.      Pharynx: Oropharynx is clear.   Eyes:      Extraocular Movements: Extraocular movements intact.      Conjunctiva/sclera: Conjunctivae normal.      Pupils: Pupils are equal, round, and reactive to light.   Cardiovascular:      Rate and Rhythm: Regular rhythm. Tachycardia present.      Heart sounds: Normal heart sounds.   Pulmonary:      Effort: No respiratory distress.      Breath sounds: Normal breath sounds. No wheezing or rales.   Abdominal:      General: Bowel sounds are normal.      Palpations: Abdomen is soft.   Musculoskeletal:         General: Normal range of motion.      Cervical back: Normal range of motion.      Comments: Left amputation    Skin:     General: Skin is warm.      Coloration: Skin is not pale.      Comments: Sacral, right heel, amputation site with wounds   Neurological:      General: No focal  deficit present.      Mental Status: She is alert and oriented to person, place, and time. Mental status is at baseline.   Psychiatric:         Behavior: Behavior normal.       Vents:  Oxygen Concentration (%): 30 (08/29/22 0335)    Lines/Drains/Airways       Central Venous Catheter Line  Duration                  Hemodialysis Catheter left subclavian -- days              Peripheral Intravenous Line  Duration                  Peripheral IV - Single Lumen 08/24/22 2130 20 G Right Shoulder 4 days         Peripheral IV - Single Lumen 08/28/22 1000 22 G Posterior;Right Wrist 1 day                    Significant Labs:    CBC/Anemia Profile:  No results for input(s): WBC, HGB, HCT, PLT, MCV, RDW, IRON, FERRITIN, RETIC, FOLATE, [id number], OCCULTBLOOD in the last 48 hours.     Chemistries:  Recent Labs   Lab 08/29/22  0710   *   K 4.2   CL 98   CO2 27   BUN 25*   CREATININE 6.67*   CALCIUM 9.3       All pertinent labs within the past 24 hours have been reviewed.    Significant Imaging:  I have reviewed all pertinent imaging results/findings within the past 24 hours.

## 2022-08-29 NOTE — SUBJECTIVE & OBJECTIVE
"Telemedicine Visit: The patient's condition can be safely assessed and treated via synchronous audio and visual telemedicine encounter.      Reason for Telemedicine Visit: covid19 precautions    Originating Site (Patient Location): Patient's home    Distant Site (Provider Location): [facility] [place] [facility]    Consent:  The patient/guardian has verbally consented to: the potential risks and benefits of telemedicine (video visit) versus in person care; bill my insurance or make self-payment for services provided; and responsibility for payment of non-covered services.     Mode of Communication:  Video Conference via [employer]    As the provider I attest to compliance with applicable laws and regulations related to telemedicine.      Palliative Care Clinical Social Work Return Appointment    PLEASE NOTE:  THIS IS A MENTAL HEALTH NOTE.  OTHER PROVIDERS VIEWING THIS NOTE SHOULD USE THIS ONLY FOR UNDERSTANDING THE CONTEXT OF THE PATIENT'S EXPERIENCE.  TOPICS DESCRIBED IN THIS NOTE SHOULD NOT BE REFERENCED TO THE PATIENT BY MEDICAL PROVIDERS.    Amy Guerrero is a 69 year old woman with advanced cardiac amoid , seen today via phone  for a return psychotherapy appointment to address symptoms  as it relates to coping with illness and treatment.    Mental Status Exam:(List all that apply)      Appearance: Appropriate      Eye Contact: Good       Orientation: Yes, x4      Mood: Normal      Affect: Appropriate      Thought Content: Clear         Thought Form: Logical      Psychomotor Behavior: Normal    Visit themes: nausea, symptoms    Adjustment to Illness: Amy shared that her main symptom of concern at this time is fluid retention.  She is meeting with her cardiology team about this later this week.  She reports that her nausea is more manageable and describes it as \"mind over matter\"  She is trying to do less and pace herself more which has helped.  She does not that this past week felt " Interval History: Somnolent on BIPAP.Hypotensive    Review of patient's allergies indicates:   Allergen Reactions    Milk containing products     Pcn [penicillins]      Current Facility-Administered Medications   Medication Frequency    0.9%  NaCl infusion PRN    acetaminophen tablet 650 mg Q4H PRN    allopurinoL tablet 100 mg Daily    apixaban tablet 2.5 mg BID    aspirin EC tablet 81 mg Daily    atorvastatin tablet 40 mg QHS    aztreonam (AZACTAM) 1,000 mg in dextrose 5 % in water (D5W) 5 % 50 mL IVPB (MB+) Daily    bisacodyL EC tablet 10 mg Daily PRN    cholecalciferol (vitamin D3) 125 mcg (5,000 unit) tablet 5,000 Units Daily    collagenase ointment Daily    dextromethorphan-guaiFENesin  mg/5 ml liquid 10 mL Q6H PRN    dextrose 50% injection 12.5 g PRN    dextrose 50% injection 25 g PRN    EScitalopram oxalate tablet 10 mg QHS    folic acid tablet 1,000 mcg QAM    gabapentin capsule 200 mg QHS    glucagon (human recombinant) injection 1 mg PRN    heparin (porcine) injection 4,000 Units PRN    HYDROcodone-acetaminophen  mg per tablet 1 tablet Q8H PRN    insulin aspart U-100 injection 0-5 Units QID (AC + HS) PRN    insulin detemir U-100 injection 10 Units QHS    levothyroxine tablet 100 mcg QAM    megestroL tablet 40 mg QAM    melatonin tablet 3 mg QHS    midodrine tablet 10 mg TID    mupirocin 2 % ointment BID    NORepinephrine 4 mg in dextrose 5 % 250 mL infusion Continuous    ondansetron injection 4 mg Q8H PRN    pantoprazole EC tablet 40 mg Daily    polyethylene glycol packet 17 g Daily    senna-docusate 8.6-50 mg per tablet 1 tablet BID    simethicone chewable tablet 80 mg TID PRN    sodium chloride 0.9% flush 10 mL Q12H PRN    trazodone split tablet 50 mg Nightly PRN       Objective:     Vital Signs (Most Recent):  Temp: 97.9 °F (36.6 °C) (08/28/22 1500)  Pulse: 105 (08/28/22 2100)  Resp: 10 (08/28/22 2100)  BP: (!) 133/25 (08/28/22 2100)  SpO2: 95 % (08/28/22 2100)  O2 Device (Oxygen Therapy):  BiPAP (08/28/22 1015)   Vital Signs (24h Range):  Temp:  [97.1 °F (36.2 °C)-98.3 °F (36.8 °C)] 97.9 °F (36.6 °C)  Pulse:  [] 105  Resp:  [7-23] 10  SpO2:  [81 %-100 %] 95 %  BP: ()/() 133/25     Weight: (!) 139.1 kg (306 lb 10.2 oz) (08/27/22 1415)  Body mass index is 54.32 kg/m².  Body surface area is 2.49 meters squared.    I/O last 3 completed shifts:  In: 250 [P.O.:100; I.V.:100; IV Piggyback:50]  Out: -     Physical Exam  Eyes:      Pupils: Pupils are equal, round, and reactive to light.   Cardiovascular:      Rate and Rhythm: Normal rate and regular rhythm.   Pulmonary:      Breath sounds: No wheezing or rales.   Abdominal:      Tenderness: There is no abdominal tenderness.   Musculoskeletal:      Cervical back: Neck supple.      Right lower leg: Edema present.   Neurological:      Mental Status: She is alert.       Significant Labs:  BMP:   Recent Labs   Lab 08/27/22  0538   *   *   K 4.1   CL 98   CO2 26   BUN 25*   CREATININE 6.65*   CALCIUM 9.0   MG 1.7     CBC:   Recent Labs   Lab 08/27/22  0538   WBC 15.24*   RBC 2.79*   HGB 8.6*   HCT 27.7*      MCV 99.3*   MCH 30.8   MCHC 31.0*        Significant Imaging:     difficult, but attributes this to several external factors including the weather and later she shared that her brother  this past week.  She notes that he was only in contact with 2 of his 3 children at the time of his death, and this is a source of grief for her.  She is trying to support her niece and nephew as best she can, recognizing that she can't support them by being physically present, but she can offer emotional support.     Mental Health (thoughts, feelings, actions, coping, and symptoms): Ginger feels she is coping well at this time and id dno thave any mental health concerns at the moment.     Helpful activities: time with famiily     Helpful cognitions:     Unhelpful and/or triggering or exacerbating factors:     Body-Mind Skills Education:     Relationships:     End of Life and Advance Care Planning:     Main therapeutic interventions provided this session include:   Facilitate processing of thoughts and feelings    Plan:  Will return for psychotherapy with palliative care focus as needed.     Time spent with patient/family:  22 minutes (Start 10:00, end 10:22)      Client has contributed regarding goals and concerns, but has not reviewed the treatment plan. Plan to review at future session.    LATRICE Quiñones, [facility]  Palliative Care Clinical        DO NOT SEND ANY LETTERS

## 2022-08-29 NOTE — PROGRESS NOTES
[facility]  Nephrology  Progress Note    Patient Name: Michelle Nunes  MRN: [id number]  Admission Date: 8/27/2022  Hospital Length of Stay: 1 days  Attending Provider: Baldemar Burk MD   Primary Care Physician: Primary Doctor No  Principal Problem:Infection of amputation stump    Subjective:     HPI: No notes on file    Interval History: Somnolent on BIPAP.Hypotensive    Review of patient's allergies indicates:   Allergen Reactions    Milk containing products     Pcn [penicillins]      Current Facility-Administered Medications   Medication Frequency    0.9%  NaCl infusion PRN    acetaminophen tablet 650 mg Q4H PRN    allopurinoL tablet 100 mg Daily    apixaban tablet 2.5 mg BID    aspirin EC tablet 81 mg Daily    atorvastatin tablet 40 mg QHS    aztreonam (AZACTAM) 1,000 mg in dextrose 5 % in water (D5W) 5 % 50 mL IVPB (MB+) Daily    bisacodyL EC tablet 10 mg Daily PRN    cholecalciferol (vitamin D3) 125 mcg (5,000 unit) tablet 5,000 Units Daily    collagenase ointment Daily    dextromethorphan-guaiFENesin  mg/5 ml liquid 10 mL Q6H PRN    dextrose 50% injection 12.5 g PRN    dextrose 50% injection 25 g PRN    EScitalopram oxalate tablet 10 mg QHS    folic acid tablet 1,000 mcg QAM    gabapentin capsule 200 mg QHS    glucagon (human recombinant) injection 1 mg PRN    heparin (porcine) injection 4,000 Units PRN    HYDROcodone-acetaminophen  mg per tablet 1 tablet Q8H PRN    insulin aspart U-100 injection 0-5 Units QID (AC + HS) PRN    insulin detemir U-100 injection 10 Units QHS    levothyroxine tablet 100 mcg QAM    megestroL tablet 40 mg QAM    melatonin tablet 3 mg QHS    midodrine tablet 10 mg TID    mupirocin 2 % ointment BID    NORepinephrine 4 mg in dextrose 5 % 250 mL infusion Continuous    ondansetron injection 4 mg Q8H PRN    pantoprazole EC tablet 40 mg Daily    polyethylene glycol packet 17 g Daily    senna-docusate 8.6-50  mg per tablet 1 tablet BID    simethicone chewable tablet 80 mg TID PRN    sodium chloride 0.9% flush 10 mL Q12H PRN    trazodone split tablet 50 mg Nightly PRN       Objective:     Vital Signs (Most Recent):  Temp: 97.9 °F (36.6 °C) (08/28/22 1500)  Pulse: 105 (08/28/22 2100)  Resp: 10 (08/28/22 2100)  BP: (!) 133/25 (08/28/22 2100)  SpO2: 95 % (08/28/22 2100)  O2 Device (Oxygen Therapy): BiPAP (08/28/22 1015)   Vital Signs (24h Range):  Temp:  [97.1 °F (36.2 °C)-98.3 °F (36.8 °C)] 97.9 °F (36.6 °C)  Pulse:  [] 105  Resp:  [7-23] 10  SpO2:  [81 %-100 %] 95 %  BP: ()/() 133/25     Weight: (!) 139.1 kg (306 lb 10.2 oz) (08/27/22 1415)  Body mass index is 54.32 kg/m².  Body surface area is 2.49 meters squared.    I/O last 3 completed shifts:  In: 250 [P.O.:100; I.V.:100; IV Piggyback:50]  Out: -     Physical Exam  Eyes:      Pupils: Pupils are equal, round, and reactive to light.   Cardiovascular:      Rate and Rhythm: Normal rate and regular rhythm.   Pulmonary:      Breath sounds: No wheezing or rales.   Abdominal:      Tenderness: There is no abdominal tenderness.   Musculoskeletal:      Cervical back: Neck supple.      Right lower leg: Edema present.   Neurological:      Mental Status: She is alert.       Significant Labs:  BMP:   Recent Labs   Lab 08/27/22  0538   *   *   K 4.1   CL 98   CO2 26   BUN 25*   CREATININE 6.65*   CALCIUM 9.0   MG 1.7     CBC:   Recent Labs   Lab 08/27/22  0538   WBC 15.24*   RBC 2.79*   HGB 8.6*   HCT 27.7*      MCV 99.3*   MCH 30.8   MCHC 31.0*        Significant Imaging:      Assessment/Plan:     PVD (peripheral vascular disease)  Status post left BKA    Hypotension  She is on midodrine  Requiring pressors    ESRD on dialysis  Dialysis TTS        Thank you for your consult.     Adan Pandya MD  Nephrology  [facility]

## 2022-08-29 NOTE — PLAN OF CARE
Problem: Adult Inpatient Plan of Care  Goal: Absence of Hospital-Acquired Illness or Injury  Outcome: Ongoing, Progressing  Goal: Optimal Comfort and Wellbeing  Outcome: Ongoing, Progressing     Problem: Diabetes Comorbidity  Goal: Blood Glucose Level Within Targeted Range  Outcome: Ongoing, Progressing     Problem: Bariatric Environmental Safety  Goal: Safety Maintained with Care  Outcome: Ongoing, Progressing     Problem: Infection  Goal: Absence of Infection Signs and Symptoms  Outcome: Ongoing, Progressing     Problem: Infection (Hemodialysis)  Goal: Absence of Infection Signs and Symptoms  Outcome: Ongoing, Progressing     Problem: Fall Injury Risk  Goal: Absence of Fall and Fall-Related Injury  Outcome: Ongoing, Progressing     Problem: Skin Injury Risk Increased  Goal: Skin Health and Integrity  Outcome: Ongoing, Progressing     Problem: Noninvasive Ventilation Acute  Goal: Effective Unassisted Ventilation and Oxygenation  Outcome: Ongoing, Progressing     Problem: Adult Inpatient Plan of Care  Goal: Plan of Care Review  Outcome: Ongoing, Not Progressing  Goal: Patient-Specific Goal (Individualized)  Outcome: Ongoing, Not Progressing  Goal: Readiness for Transition of Care  Outcome: Ongoing, Not Progressing     Problem: Impaired Wound Healing  Goal: Optimal Wound Healing  Outcome: Ongoing, Not Progressing     Problem: Hemodynamic Instability (Hemodialysis)  Goal: Effective Tissue Perfusion  Outcome: Ongoing, Not Progressing

## 2022-08-29 NOTE — NURSING
Pt not given polyethylene glycol packet and senna docusate on today due to excessive bm  aware via secure chat

## 2022-08-29 NOTE — PT/OT/SLP PROGRESS
Physical Therapy      Patient Name:  Michelle Nunes   MRN:  [id number]    Patient not seen today secondary to Therapist assessment, screen only. Patient dependent for functional mobility at baseline with marianne lift to power mobility. Patient currently has multiple areas of wounds- L AKA, R heel, sacrum/buttocks with c/o significant pain and should avoid sitting. Per  note, family unable to care for patient and are interested in nursing home placement for patient when medically ready for discharge. No skilled PT need. HOB elevation for sitting PRN.

## 2022-08-29 NOTE — PROCEDURES
"Michelle Nunes is a 66 y.o. female patient.    Temp: 99.3 °F (37.4 °C) (08/29/22 1213)  Pulse: 82 (08/29/22 1215)  Resp: 14 (08/29/22 1215)  BP: (!) 88/69 (08/29/22 1215)  SpO2: 100 % (08/29/22 1215)  Weight: (!) 139.1 kg (306 lb 10.2 oz) (08/27/22 1415)  Height: 5' 3" (160 cm) (08/27/22 1415)       Debridement    Date/Time: 8/29/2022 12:27 PM  Performed by: KARELY Fonseca  Authorized by: KARELY Fonseca     Consent Done?:  Yes (Written)  Local anesthesia used?: No      Wound Details:    Location:  Right foot    Location:  Right Heel    Type of Debridement:  Excisional       Length (cm):  5       Area (sq cm):  25       Width (cm):  5       Percent Debrided (%):  100       Depth (cm):  1       Total Area Debrided (sq cm):  25    Depth of debridement:  Subcutaneous tissue    Tissue debrided:  Adipose, Dermis, Epidermis and Subcutaneous    Devitalized tissue debrided:  Necrotic/Eschar, Slough, Exudate and Clots    Instruments:  Scissors    Bleeding:  None  Patient tolerance:  Patient tolerated the procedure well with no immediate complications     Assistant Akiko Diehl LPN    8/29/2022    "

## 2022-08-30 LAB
BACTERIA BLD CULT: NORMAL
GLUCOSE SERPL-MCNC: 163 MG/DL (ref 70–105)
GLUCOSE SERPL-MCNC: 180 MG/DL (ref 70–105)
GLUCOSE SERPL-MCNC: 189 MG/DL (ref 70–105)
GLUCOSE SERPL-MCNC: 201 MG/DL (ref 70–105)
HCO3 UR-SCNC: 27.7 MMOL/L (ref 21–28)
HCO3 UR-SCNC: 28.5 MMOL/L (ref 21–28)
PCO2 BLDA: 55 MMHG (ref 35–48)
PCO2 BLDA: 62 MMHG (ref 35–48)
PH SMN: 7.27 [PH] (ref 7.35–7.45)
PH SMN: 7.31 [PH] (ref 7.35–7.45)
PO2 BLDA: 150 MMHG (ref 83–108)
PO2 BLDA: 81 MMHG (ref 83–108)
POC BASE EXCESS: 0.3 MMOL/L (ref -2–3)
POC BASE EXCESS: 0.5 MMOL/L (ref -2–3)
POC SATURATED O2: 95 %
POC SATURATED O2: 99 %

## 2022-08-30 PROCEDURE — 99233 SBSQ HOSP IP/OBS HIGH 50: CPT | Mod: ,,, | Performed by: INTERNAL MEDICINE

## 2022-08-30 PROCEDURE — 27000952

## 2022-08-30 PROCEDURE — 27000934

## 2022-08-30 PROCEDURE — 25000003 PHARM REV CODE 250: Performed by: FAMILY MEDICINE

## 2022-08-30 PROCEDURE — 82962 GLUCOSE BLOOD TEST: CPT

## 2022-08-30 PROCEDURE — 11000008

## 2022-08-30 PROCEDURE — 94660 CPAP INITIATION&MGMT: CPT

## 2022-08-30 PROCEDURE — 27000221 HC OXYGEN, UP TO 24 HOURS

## 2022-08-30 PROCEDURE — 99233 PR SUBSEQUENT HOSPITAL CARE,LEVL III: ICD-10-PCS | Mod: ,,, | Performed by: INTERNAL MEDICINE

## 2022-08-30 PROCEDURE — S0073 INJECTION, AZTREONAM, 500 MG: HCPCS | Performed by: FAMILY MEDICINE

## 2022-08-30 PROCEDURE — 25000003 PHARM REV CODE 250: Performed by: NURSE PRACTITIONER

## 2022-08-30 PROCEDURE — 27000940

## 2022-08-30 PROCEDURE — 25000003 PHARM REV CODE 250: Performed by: INTERNAL MEDICINE

## 2022-08-30 PROCEDURE — 99900035 HC TECH TIME PER 15 MIN (STAT)

## 2022-08-30 RX ORDER — HYDROCORTISONE ACETATE 25 MG/1
25 SUPPOSITORY RECTAL 2 TIMES DAILY
Status: DISCONTINUED | OUTPATIENT
Start: 2022-08-30 | End: 2022-09-14 | Stop reason: HOSPADM

## 2022-08-30 RX ADMIN — HYDROCORTISONE ACETATE 25 MG: 25 SUPPOSITORY RECTAL at 12:08

## 2022-08-30 RX ADMIN — HYDROCORTISONE ACETATE 25 MG: 25 SUPPOSITORY RECTAL at 09:08

## 2022-08-30 RX ADMIN — MIDODRINE HYDROCHLORIDE 10 MG: 5 TABLET ORAL at 04:08

## 2022-08-30 RX ADMIN — COLLAGENASE SANTYL: 250 OINTMENT TOPICAL at 04:08

## 2022-08-30 RX ADMIN — GABAPENTIN 200 MG: 100 CAPSULE ORAL at 09:08

## 2022-08-30 RX ADMIN — ATORVASTATIN CALCIUM 40 MG: 40 TABLET, FILM COATED ORAL at 09:08

## 2022-08-30 RX ADMIN — ASPIRIN 81 MG: 81 TABLET, COATED ORAL at 08:08

## 2022-08-30 RX ADMIN — NOREPINEPHRINE BITARTRATE 0.03 MCG/KG/MIN: 1 INJECTION, SOLUTION, CONCENTRATE INTRAVENOUS at 01:08

## 2022-08-30 RX ADMIN — MEGESTROL ACETATE 40 MG: 40 TABLET ORAL at 06:08

## 2022-08-30 RX ADMIN — Medication 3 MG: at 09:08

## 2022-08-30 RX ADMIN — MIDODRINE HYDROCHLORIDE 10 MG: 5 TABLET ORAL at 08:08

## 2022-08-30 RX ADMIN — MIDODRINE HYDROCHLORIDE 10 MG: 5 TABLET ORAL at 09:08

## 2022-08-30 RX ADMIN — APIXABAN 2.5 MG: 2.5 TABLET, FILM COATED ORAL at 08:08

## 2022-08-30 RX ADMIN — MUPIROCIN: 20 OINTMENT TOPICAL at 09:08

## 2022-08-30 RX ADMIN — AZTREONAM 1000 MG: 1 INJECTION, POWDER, LYOPHILIZED, FOR SOLUTION INTRAMUSCULAR; INTRAVENOUS at 08:08

## 2022-08-30 RX ADMIN — HYDROCODONE BITARTRATE AND ACETAMINOPHEN 1 TABLET: 10; 325 TABLET ORAL at 08:08

## 2022-08-30 RX ADMIN — LEVOTHYROXINE SODIUM 100 MCG: 100 TABLET ORAL at 06:08

## 2022-08-30 RX ADMIN — ESCITALOPRAM OXALATE 10 MG: 10 TABLET ORAL at 09:08

## 2022-08-30 RX ADMIN — FOLIC ACID 1000 MCG: 1 TABLET ORAL at 06:08

## 2022-08-30 RX ADMIN — COLLAGENASE SANTYL: 250 OINTMENT TOPICAL at 09:08

## 2022-08-30 RX ADMIN — APIXABAN 2.5 MG: 2.5 TABLET, FILM COATED ORAL at 09:08

## 2022-08-30 RX ADMIN — Medication 5000 UNITS: at 08:08

## 2022-08-30 RX ADMIN — PANTOPRAZOLE SODIUM 40 MG: 40 TABLET, DELAYED RELEASE ORAL at 08:08

## 2022-08-30 RX ADMIN — ALLOPURINOL 100 MG: 100 TABLET ORAL at 08:08

## 2022-08-30 NOTE — PT/OT/SLP PROGRESS
Occupational Therapy      Patient Name:  Michelle Nunes   MRN:  [id number]    Patient not seen today secondary to Pt is very lethargic, able to wake up breifly then falls asleep immediately. Pt unable to stay awake enopugh to participate with OT services. Pt's (B) UE were propped up on pillows to help to reduce edema. Will follow-up 8/21/2022.    8/30/2022   no

## 2022-08-30 NOTE — CONSULTS
[facility]  Nephrology  Consult Note    Patient Name: Michelle Nunes  MRN: [id number]  Admission Date: 8/27/2022  Hospital Length of Stay: 2 days  Attending Provider: Donis Shi DO   Primary Care Physician: Primary Doctor No  Principal Problem:Infection of amputation stump    Consults  Subjective:     HPI: This patient is known to this nephrology service from a previous consult.  She has a history of ESRD due to HTN and DM.  She has a history of PVD and is s/p left AKA.  The patient presented with a wound on the right foot.  She has required broad spectrum antibiotics.  She has been transferred to Specialty for further care.  She is on pressor medications.      Past Medical History:   Diagnosis Date    A-fib     Blind right eye     Diabetes mellitus     ESRD (end stage renal disease) on dialysis     GERD (gastroesophageal reflux disease)     Gout, unspecified     Heart attack     HTN (hypertension)     Kidney failure     Neuropathy        Past Surgical History:   Procedure Laterality Date    CHOLECYSTECTOMY      HYSTERECTOMY      LEG AMPUTATION THROUGH KNEE Left        Review of patient's allergies indicates:   Allergen Reactions    Milk containing products     Pcn [penicillins]      Current Facility-Administered Medications   Medication Frequency    0.9%  NaCl infusion PRN    acetaminophen tablet 650 mg Q4H PRN    allopurinoL tablet 100 mg Daily    apixaban tablet 2.5 mg BID    aspirin EC tablet 81 mg Daily    atorvastatin tablet 40 mg QHS    aztreonam (AZACTAM) 1,000 mg in dextrose 5 % in water (D5W) 5 % 50 mL IVPB (MB+) Daily    bisacodyL EC tablet 10 mg Daily PRN    cholecalciferol (vitamin D3) 125 mcg (5,000 unit) tablet 5,000 Units Daily    collagenase ointment Daily    dextromethorphan-guaiFENesin  mg/5 ml liquid 10 mL Q6H PRN    dextrose 50% injection 12.5 g PRN    dextrose 50% injection 25 g PRN    EScitalopram oxalate tablet 10  mg QHS    folic acid tablet 1,000 mcg QAM    gabapentin capsule 200 mg QHS    glucagon (human recombinant) injection 1 mg PRN    heparin (porcine) injection 4,000 Units PRN    HYDROcodone-acetaminophen  mg per tablet 1 tablet Q8H PRN    insulin aspart U-100 injection 0-5 Units QID (AC + HS) PRN    insulin detemir U-100 injection 10 Units QHS    levothyroxine tablet 100 mcg QAM    megestroL tablet 40 mg QAM    melatonin tablet 3 mg QHS    midodrine tablet 10 mg TID    mupirocin 2 % ointment BID    NORepinephrine 4 mg in dextrose 5 % 250 mL infusion Continuous    ondansetron injection 4 mg Q8H PRN    pantoprazole EC tablet 40 mg Daily    polyethylene glycol packet 17 g Daily    senna-docusate 8.6-50 mg per tablet 1 tablet BID    simethicone chewable tablet 80 mg TID PRN    sodium chloride 0.9% flush 10 mL Q12H PRN    traZODone tablet 50 mg Nightly PRN     Family History       Problem Relation (Age of Onset)    Cancer Brother    Cirrhosis Mother    Colon cancer Brother    Diabetes Father    Hypertension Father          Tobacco Use    Smoking status: Never    Smokeless tobacco: Never   Substance and Sexual Activity    Alcohol use: Never    Drug use: Never    Sexual activity: Not on file     Review of Systems   All other systems reviewed and are negative.  Objective:     Vital Signs (Most Recent):  Temp: 97.1 °F (36.2 °C) (08/29/22 1600)  Pulse: 93 (08/29/22 1900)  Resp: 12 (08/29/22 1900)  BP: (!) 111/33 (08/29/22 1900)  SpO2: 99 % (08/29/22 1900)  O2 Device (Oxygen Therapy): nasal cannula w/ humidification (08/29/22 0815)   Vital Signs (24h Range):  Temp:  [97.1 °F (36.2 °C)-99.3 °F (37.4 °C)] 97.1 °F (36.2 °C)  Pulse:  [] 93  Resp:  [9-21] 12  SpO2:  [88 %-100 %] 99 %  BP: ()/() 111/33     Weight: (!) 139.1 kg (306 lb 10.2 oz) (08/27/22 1415)  Body mass index is 54.32 kg/m².  Body surface area is 2.49 meters squared.    I/O last 3 completed shifts:  In: 980 [P.O.:880;  IV Piggyback:100]  Out: -     Physical Exam  Vitals reviewed.   HENT:      Head: Normocephalic and atraumatic.      Mouth/Throat:      Mouth: Mucous membranes are moist.   Cardiovascular:      Rate and Rhythm: Regular rhythm.   Pulmonary:      Effort: Pulmonary effort is normal.   Abdominal:      Palpations: Abdomen is soft.   Musculoskeletal:         General: Deformity present.       Significant Labs:  BMP:   Recent Labs   Lab 08/27/22  0538 08/29/22  0710   * 115*   * 132*   K 4.1 4.2   CL 98 98   CO2 26 27   BUN 25* 25*   CREATININE 6.65* 6.67*   CALCIUM 9.0 9.3   MG 1.7  --      CBC:   Recent Labs   Lab 08/27/22  0538   WBC 15.24*   RBC 2.79*   HGB 8.6*   HCT 27.7*      MCV 99.3*   MCH 30.8   MCHC 31.0*       Significant Imaging:  Labs: Reviewed    Assessment/Plan:     Hypotension  She is on midodrine  Requiring pressors  She continues to require pressors.    ESRD on dialysis  Dialysis TTS    Diabetic ulcer of right heel associated with type 2 diabetes mellitus  Broad spectrum antitibiotics.        Thank you for your consult. I will follow-up with patient. Please contact us if you have any additional questions.    Jef Romero Jr, MD  Nephrology  [facility] \[facility]\""

## 2022-08-30 NOTE — SUBJECTIVE & OBJECTIVE
Past Medical History:   Diagnosis Date    A-fib     Blind right eye     Diabetes mellitus     ESRD (end stage renal disease) on dialysis     GERD (gastroesophageal reflux disease)     Gout, unspecified     Heart attack     HTN (hypertension)     Kidney failure     Neuropathy        Past Surgical History:   Procedure Laterality Date    CHOLECYSTECTOMY      HYSTERECTOMY      LEG AMPUTATION THROUGH KNEE Left        Review of patient's allergies indicates:   Allergen Reactions    Milk containing products     Pcn [penicillins]      Current Facility-Administered Medications   Medication Frequency    0.9%  NaCl infusion PRN    acetaminophen tablet 650 mg Q4H PRN    allopurinoL tablet 100 mg Daily    apixaban tablet 2.5 mg BID    aspirin EC tablet 81 mg Daily    atorvastatin tablet 40 mg QHS    aztreonam (AZACTAM) 1,000 mg in dextrose 5 % in water (D5W) 5 % 50 mL IVPB (MB+) Daily    bisacodyL EC tablet 10 mg Daily PRN    cholecalciferol (vitamin D3) 125 mcg (5,000 unit) tablet 5,000 Units Daily    collagenase ointment Daily    dextromethorphan-guaiFENesin  mg/5 ml liquid 10 mL Q6H PRN    dextrose 50% injection 12.5 g PRN    dextrose 50% injection 25 g PRN    EScitalopram oxalate tablet 10 mg QHS    folic acid tablet 1,000 mcg QAM    gabapentin capsule 200 mg QHS    glucagon (human recombinant) injection 1 mg PRN    heparin (porcine) injection 4,000 Units PRN    HYDROcodone-acetaminophen  mg per tablet 1 tablet Q8H PRN    insulin aspart U-100 injection 0-5 Units QID (AC + HS) PRN    insulin detemir U-100 injection 10 Units QHS    levothyroxine tablet 100 mcg QAM    megestroL tablet 40 mg QAM    melatonin tablet 3 mg QHS    midodrine tablet 10 mg TID    mupirocin 2 % ointment BID    NORepinephrine 4 mg in dextrose 5 % 250 mL infusion Continuous    ondansetron injection 4 mg Q8H PRN    pantoprazole EC tablet 40 mg Daily    polyethylene glycol packet 17 g Daily    senna-docusate 8.6-50 mg per tablet 1 tablet BID     simethicone chewable tablet 80 mg TID PRN    sodium chloride 0.9% flush 10 mL Q12H PRN    traZODone tablet 50 mg Nightly PRN     Family History       Problem Relation (Age of Onset)    Cancer Brother    Cirrhosis Mother    Colon cancer Brother    Diabetes Father    Hypertension Father          Tobacco Use    Smoking status: Never    Smokeless tobacco: Never   Substance and Sexual Activity    Alcohol use: Never    Drug use: Never    Sexual activity: Not on file     Review of Systems   All other systems reviewed and are negative.  Objective:     Vital Signs (Most Recent):  Temp: 97.1 °F (36.2 °C) (08/29/22 1600)  Pulse: 93 (08/29/22 1900)  Resp: 12 (08/29/22 1900)  BP: (!) 111/33 (08/29/22 1900)  SpO2: 99 % (08/29/22 1900)  O2 Device (Oxygen Therapy): nasal cannula w/ humidification (08/29/22 0815)   Vital Signs (24h Range):  Temp:  [97.1 °F (36.2 °C)-99.3 °F (37.4 °C)] 97.1 °F (36.2 °C)  Pulse:  [] 93  Resp:  [9-21] 12  SpO2:  [88 %-100 %] 99 %  BP: ()/() 111/33     Weight: (!) 139.1 kg (306 lb 10.2 oz) (08/27/22 1415)  Body mass index is 54.32 kg/m².  Body surface area is 2.49 meters squared.    I/O last 3 completed shifts:  In: 980 [P.O.:880; IV Piggyback:100]  Out: -     Physical Exam  Vitals reviewed.   HENT:      Head: Normocephalic and atraumatic.      Mouth/Throat:      Mouth: Mucous membranes are moist.   Cardiovascular:      Rate and Rhythm: Regular rhythm.   Pulmonary:      Effort: Pulmonary effort is normal.   Abdominal:      Palpations: Abdomen is soft.   Musculoskeletal:         General: Deformity present.       Significant Labs:  BMP:   Recent Labs   Lab 08/27/22  0538 08/29/22  0710   * 115*   * 132*   K 4.1 4.2   CL 98 98   CO2 26 27   BUN 25* 25*   CREATININE 6.65* 6.67*   CALCIUM 9.0 9.3   MG 1.7  --      CBC:   Recent Labs   Lab 08/27/22  0538   WBC 15.24*   RBC 2.79*   HGB 8.6*   HCT 27.7*      MCV 99.3*   MCH 30.8   MCHC 31.0*       Significant  Imaging:  Labs: Reviewed

## 2022-08-30 NOTE — PROGRESS NOTES
[facility] [place]  Pulmonology  Progress Note    Patient Name: Michelle Nunes  MRN: [id number]  Admission Date: 8/27/2022  Hospital Length of Stay: 3 days  Code Status: Full Code  Attending Provider: Donis Shi DO  Primary Care Provider: Primary Doctor No   Principal Problem: Infection of amputation stump    Subjective:     Interval History: No acute events overnight. The patient is resting comfortably this am. She is afebrile and vital signs are stable.    Objective:     Vital Signs (Most Recent):  Temp: 97.5 °F (36.4 °C) (08/30/22 0330)  Pulse: 69 (08/30/22 0700)  Resp: 11 (08/30/22 0700)  BP: (!) 179/119 (08/30/22 0700)  SpO2: 99 % (08/30/22 0700)   Vital Signs (24h Range):  Temp:  [97.1 °F (36.2 °C)-99.3 °F (37.4 °C)] 97.5 °F (36.4 °C)  Pulse:  [] 69  Resp:  [0-21] 11  SpO2:  [83 %-100 %] 99 %  BP: ()/() 179/119     Weight: (!) 139.1 kg (306 lb 10.2 oz)  Body mass index is 54.32 kg/m².      Intake/Output Summary (Last 24 hours) at 8/30/2022 0756  Last data filed at 8/29/2022 1749  Gross per 24 hour   Intake 830 ml   Output --   Net 830 ml         Physical Exam  Vitals and nursing note reviewed.   Constitutional:       General: She is not in acute distress.     Appearance: She is obese. She is not ill-appearing.   HENT:      Head: Normocephalic and atraumatic.      Right Ear: External ear normal.      Left Ear: External ear normal.      Mouth/Throat:      Mouth: Mucous membranes are moist.      Pharynx: Oropharynx is clear.   Eyes:      Extraocular Movements: Extraocular movements intact.      Conjunctiva/sclera: Conjunctivae normal.      Pupils: Pupils are equal, round, and reactive to light.   Cardiovascular:      Rate and Rhythm: Regular rhythm. Tachycardia present.      Heart sounds: Normal heart sounds.   Pulmonary:      Effort: No respiratory distress.      Breath sounds: Normal breath sounds. No wheezing or rales.   Abdominal:      General: Bowel sounds  are normal.      Palpations: Abdomen is soft.   Musculoskeletal:         General: Normal range of motion.      Cervical back: Normal range of motion.      Comments: Left amputation    Skin:     General: Skin is warm.      Coloration: Skin is not pale.      Comments: Sacral, right heel, amputation site with wounds   Neurological:      General: No focal deficit present.      Mental Status: She is alert and oriented to person, place, and time. Mental status is at baseline.   Psychiatric:         Behavior: Behavior normal.       Vents:  Oxygen Concentration (%): 32 (08/29/22 1530)    Lines/Drains/Airways       Central Venous Catheter Line  Duration                  Hemodialysis Catheter left subclavian -- days              Peripheral Intravenous Line  Duration                  Peripheral IV - Single Lumen 08/24/22 2130 20 G Right Shoulder 5 days         Peripheral IV - Single Lumen 08/28/22 1000 22 G Posterior;Right Wrist 1 day                    Significant Labs:    CBC/Anemia Profile:  No results for input(s): WBC, HGB, HCT, PLT, MCV, RDW, IRON, FERRITIN, RETIC, FOLATE, [id number], OCCULTBLOOD in the last 48 hours.     Chemistries:  Recent Labs   Lab 08/29/22  0710   *   K 4.2   CL 98   CO2 27   BUN 25*   CREATININE 6.67*   CALCIUM 9.3         All pertinent labs within the past 24 hours have been reviewed.    Significant Imaging:  I have reviewed all pertinent imaging results/findings within the past 24 hours.    Assessment/Plan:     * Infection of amputation stump  Wound care - cultures reviewed - sensitive to Azactam     Morbid obesity with BMI of 50.0-59.9, adult  Complicates all levels of care    Acute hypercapnic respiratory failure  Hx ROXANN, OHS -- needs to use bipap every night   Pt placed on Bipap this morning --- ABGs returned - 7.27/62/150   - will change settings and repeat labs in 1 hour   8/29 Awake and alert this am. Placing on nasal cannula during the day and bipap at night  On nasal cannula this  am. Slept with the bipap overnight. Continue with current care      Hypotension  BP very labile -- seems to get worse after dialysis - started on Levophed and transferred to [facility] last night  - midodrine increased yesterday    8/29 BP looks ok this am  Now hypertensive at times    Atrial fibrillation, controlled  Rate controlled- continue eliquis    Pressure injury of buttock, unstageable  Continue with wound care    Type 2 diabetes mellitus  Glucose <180  Continue with current care  She is on 10 units of levemir and mild sliding scale    ESRD on dialysis  Continue with dialysis per nephrology  T/TH/ Sat    Deep vein thrombosis (DVT) of lower extremity  Dopplers negative here   She is on eliquis 2.5 bid    Diabetic ulcer of right heel associated with type 2 diabetes mellitus  Continue with wound care                  Donis Shi,   Pulmonology  [facility]

## 2022-08-30 NOTE — HPI
This patient is known to this nephrology service from a previous consult.  She has a history of ESRD due to HTN and DM.  She has a history of PVD and is s/p left AKA.  The patient presented with a wound on the right foot.  She has required broad spectrum antibiotics.  She has been transferred to Specialty for further care.  She is on pressor medications.

## 2022-08-30 NOTE — PLAN OF CARE
Problem: Occupational Therapy  Goal: Occupational Therapy Goal  Description: STG:  Pt will perform grooming with setup  Pt will bathe with setup and min(A) for upper body anterior  Pt will perform UE dressing with setup  Pt will perform self- feeding with setup  Pt will perform HEP independently  Pt will tolerate 20 minutes of tx without fatigue      LT.Restore to max I with self care and mobility.     Outcome: Ongoing, Not Progressing     OT eval recently performed on 2022. Eval was limited due to pt lethargy. OT was not able to treat pt on Tuesday, 2022 due to lethargy. OT will offer a 1 week trial to see how pt responds to OT services.     Pt is currently dependent for all cares.

## 2022-08-30 NOTE — PLAN OF CARE
Problem: Adult Inpatient Plan of Care  Goal: Patient-Specific Goal (Individualized)  Outcome: Ongoing, Progressing  Goal: Absence of Hospital-Acquired Illness or Injury  Outcome: Ongoing, Progressing  Goal: Optimal Comfort and Wellbeing  Outcome: Ongoing, Progressing     Problem: Bariatric Environmental Safety  Goal: Safety Maintained with Care  Outcome: Ongoing, Progressing     Problem: Infection  Goal: Absence of Infection Signs and Symptoms  Outcome: Ongoing, Progressing     Problem: Device-Related Complication Risk (Hemodialysis)  Goal: Safe, Effective Therapy Delivery  Outcome: Ongoing, Progressing     Problem: Infection (Hemodialysis)  Goal: Absence of Infection Signs and Symptoms  Outcome: Ongoing, Progressing     Problem: Fall Injury Risk  Goal: Absence of Fall and Fall-Related Injury  Outcome: Ongoing, Progressing     Problem: Noninvasive Ventilation Acute  Goal: Effective Unassisted Ventilation and Oxygenation  Outcome: Ongoing, Progressing     Problem: Adult Inpatient Plan of Care  Goal: Plan of Care Review  Outcome: Ongoing, Not Progressing  Goal: Readiness for Transition of Care  Outcome: Ongoing, Not Progressing     Problem: Diabetes Comorbidity  Goal: Blood Glucose Level Within Targeted Range  Outcome: Ongoing, Not Progressing     Problem: Impaired Wound Healing  Goal: Optimal Wound Healing  Outcome: Ongoing, Not Progressing     Problem: Hemodynamic Instability (Hemodialysis)  Goal: Effective Tissue Perfusion  Outcome: Ongoing, Not Progressing     Problem: Skin Injury Risk Increased  Goal: Skin Health and Integrity  Outcome: Ongoing, Not Progressing

## 2022-08-30 NOTE — PROGRESS NOTES
[facility]  Nephrology  Progress Note    Patient Name: Michelle Nunes  MRN: [id number]  Admission Date: 8/27/2022  Hospital Length of Stay: 3 days  Attending Provider: Donis Shi DO   Primary Care Physician: Primary Doctor No  Principal Problem:Infection of amputation stump    Consults  Subjective:     Interval History: F/U ESRD. Remains on pressors with bp of 90. Arousable and conversant.     Review of patient's allergies indicates:   Allergen Reactions    Milk containing products     Pcn [penicillins]      Current Facility-Administered Medications   Medication Frequency    0.9%  NaCl infusion PRN    acetaminophen tablet 650 mg Q4H PRN    allopurinoL tablet 100 mg Daily    apixaban tablet 2.5 mg BID    aspirin EC tablet 81 mg Daily    atorvastatin tablet 40 mg QHS    bisacodyL EC tablet 10 mg Daily PRN    cholecalciferol (vitamin D3) 125 mcg (5,000 unit) tablet 5,000 Units Daily    collagenase ointment Daily    dextromethorphan-guaiFENesin  mg/5 ml liquid 10 mL Q6H PRN    dextrose 50% injection 12.5 g PRN    dextrose 50% injection 25 g PRN    EScitalopram oxalate tablet 10 mg QHS    folic acid tablet 1,000 mcg QAM    gabapentin capsule 200 mg QHS    glucagon (human recombinant) injection 1 mg PRN    heparin (porcine) injection 4,000 Units PRN    HYDROcodone-acetaminophen  mg per tablet 1 tablet Q8H PRN    hydrocortisone suppository 25 mg BID    insulin aspart U-100 injection 0-5 Units QID (AC + HS) PRN    insulin detemir U-100 injection 10 Units QHS    levothyroxine tablet 100 mcg QAM    megestroL tablet 40 mg QAM    melatonin tablet 3 mg QHS    midodrine tablet 10 mg TID    NORepinephrine 4 mg in dextrose 5 % 250 mL infusion Continuous    ondansetron injection 4 mg Q8H PRN    pantoprazole EC tablet 40 mg Daily    simethicone chewable tablet 80 mg TID PRN    sodium chloride 0.9% flush 10 mL Q12H PRN    traZODone tablet 50 mg Nightly PRN       Objective:      Vital Signs (Most Recent):  Temp: 97.5 °F (36.4 °C) (08/30/22 0330)  Pulse: 69 (08/30/22 0700)  Resp: 20 (08/30/22 0856)  BP: (!) 179/119 (08/30/22 0700)  SpO2: 99 % (08/30/22 0700)  O2 Device (Oxygen Therapy): nasal cannula w/ humidification (08/29/22 0815)   Vital Signs (24h Range):  Temp:  [97.1 °F (36.2 °C)-98.1 °F (36.7 °C)] 97.5 °F (36.4 °C)  Pulse:  [] 69  Resp:  [0-21] 20  SpO2:  [83 %-100 %] 99 %  BP: ()/() 179/119     Weight: (!) 139.1 kg (306 lb 10.2 oz) (08/27/22 1415)  Body mass index is 54.32 kg/m².  Body surface area is 2.49 meters squared.    I/O last 3 completed shifts:  In: 830 [P.O.:780; IV Piggyback:50]  Out: -     Physical Exam Sleepy but easily arousable. BP 90 systolic on Levophed along with oral Midodrine    Significant Labs:sureBMP:   Recent Labs   Lab 08/27/22  0538 08/29/22  0710   * 115*   * 132*   K 4.1 4.2   CL 98 98   CO2 26 27   BUN 25* 25*   CREATININE 6.65* 6.67*   CALCIUM 9.0 9.3   MG 1.7  --      CBC:   Recent Labs   Lab 08/27/22  0538   WBC 15.24*   RBC 2.79*   HGB 8.6*   HCT 27.7*      MCV 99.3*   MCH 30.8   MCHC 31.0*     All labs within the past 24 hours have been reviewed.    Significant Imaging:  Labs: Reviewed    Assessment/Plan:     Active Diagnoses:    Diagnosis Date Noted POA    PRINCIPAL PROBLEM:  Infection of amputation stump [T87.40] 08/25/2022 Yes    Morbid obesity with BMI of 50.0-59.9, adult [E66.01, Z68.43] 08/29/2022 Not Applicable    Acute hypercapnic respiratory failure [J96.02] 08/28/2022 Yes    PVD (peripheral vascular disease) [I73.9] 08/27/2022 Unknown    Hypotension [I95.9] 08/26/2022 Yes    Diabetic ulcer of right heel associated with type 2 diabetes mellitus [E11.621, L97.419] 08/25/2022 Yes    Deep vein thrombosis (DVT) of lower extremity [I82.409] 08/25/2022 Yes    ESRD on dialysis [N18.6, Z99.2] 08/25/2022 Not Applicable    Pressure injury of buttock, unstageable [L89.300] 08/25/2022 Yes    Atrial  fibrillation, controlled [I48.91] 08/25/2022 Yes    Type 2 diabetes mellitus [E11.9] 08/25/2022 Yes      Problems Resolved During this Admission:    Diagnosis Date Noted Date Resolved POA    Apnea [R06.81] 08/27/2022 08/28/2022 Yes       Plan is for dialysis tomorrow. May require increased pressors during that time due to priming volume of dialysis system and any volume removal if necessary    Thank you for your consult. I will follow-up with patient. Please contact us if you have any additional questions.    Haile Rodriguez MD  Nephrology  [facility]

## 2022-08-30 NOTE — PROGRESS NOTES
Wound care note;  Patient skin was evaluated today with Teresa CALI.  Patient in bed, alert. Daughter was at bedside.   Has multiple wounds to bilateral buttocks range from pink, tan and  black necrotic tissue.  Applied santly ointment to all wounds to buttocks.  Left anterior AKA wound with slow red granular tissue, outer edges excoriated , Applied moist vashe drawtex to wound bed.   Right posterior heel black tissue, bedside debridement per Marnie FNJALEESA.   Cont turn protocol  Waffle boots/pillow to right heel  On Custom Soln Bariatric low air loss mattress   Wound care team cont to follow.

## 2022-08-30 NOTE — NURSING
Patient refused applesauce with her crushed med. Patient said applesauce gives her loose bowels. Patient received her prn med crushed and spirit was given. Patient told Nurse to get out her room. Nurse explained that Her medications were crushed and not whole. Nurse apologized to patient, Patient was upset. Reported To RN Katy and incoming shift.

## 2022-08-30 NOTE — SUBJECTIVE & OBJECTIVE
Interval History: No acute events overnight. The patient is resting comfortably this am. She is afebrile and vital signs are stable.    Objective:     Vital Signs (Most Recent):  Temp: 97.5 °F (36.4 °C) (08/30/22 0330)  Pulse: 69 (08/30/22 0700)  Resp: 11 (08/30/22 0700)  BP: (!) 179/119 (08/30/22 0700)  SpO2: 99 % (08/30/22 0700)   Vital Signs (24h Range):  Temp:  [97.1 °F (36.2 °C)-99.3 °F (37.4 °C)] 97.5 °F (36.4 °C)  Pulse:  [] 69  Resp:  [0-21] 11  SpO2:  [83 %-100 %] 99 %  BP: ()/() 179/119     Weight: (!) 139.1 kg (306 lb 10.2 oz)  Body mass index is 54.32 kg/m².      Intake/Output Summary (Last 24 hours) at 8/30/2022 0756  Last data filed at 8/29/2022 1749  Gross per 24 hour   Intake 830 ml   Output --   Net 830 ml         Physical Exam  Vitals and nursing note reviewed.   Constitutional:       General: She is not in acute distress.     Appearance: She is obese. She is not ill-appearing.   HENT:      Head: Normocephalic and atraumatic.      Right Ear: External ear normal.      Left Ear: External ear normal.      Mouth/Throat:      Mouth: Mucous membranes are moist.      Pharynx: Oropharynx is clear.   Eyes:      Extraocular Movements: Extraocular movements intact.      Conjunctiva/sclera: Conjunctivae normal.      Pupils: Pupils are equal, round, and reactive to light.   Cardiovascular:      Rate and Rhythm: Regular rhythm. Tachycardia present.      Heart sounds: Normal heart sounds.   Pulmonary:      Effort: No respiratory distress.      Breath sounds: Normal breath sounds. No wheezing or rales.   Abdominal:      General: Bowel sounds are normal.      Palpations: Abdomen is soft.   Musculoskeletal:         General: Normal range of motion.      Cervical back: Normal range of motion.      Comments: Left amputation    Skin:     General: Skin is warm.      Coloration: Skin is not pale.      Comments: Sacral, right heel, amputation site with wounds   Neurological:      General: No focal deficit  present.      Mental Status: She is alert and oriented to person, place, and time. Mental status is at baseline.   Psychiatric:         Behavior: Behavior normal.       Vents:  Oxygen Concentration (%): 32 (08/29/22 1530)    Lines/Drains/Airways       Central Venous Catheter Line  Duration                  Hemodialysis Catheter left subclavian -- days              Peripheral Intravenous Line  Duration                  Peripheral IV - Single Lumen 08/24/22 2130 20 G Right Shoulder 5 days         Peripheral IV - Single Lumen 08/28/22 1000 22 G Posterior;Right Wrist 1 day                    Significant Labs:    CBC/Anemia Profile:  No results for input(s): WBC, HGB, HCT, PLT, MCV, RDW, IRON, FERRITIN, RETIC, FOLATE, [id number], OCCULTBLOOD in the last 48 hours.     Chemistries:  Recent Labs   Lab 08/29/22  0710   *   K 4.2   CL 98   CO2 27   BUN 25*   CREATININE 6.67*   CALCIUM 9.3         All pertinent labs within the past 24 hours have been reviewed.    Significant Imaging:  I have reviewed all pertinent imaging results/findings within the past 24 hours.

## 2022-08-30 NOTE — ASSESSMENT & PLAN NOTE
BP very labile -- seems to get worse after dialysis - started on Levophed and transferred to [facility] last night  - midodrine increased yesterday    8/29 BP looks ok this am  Now hypertensive at times

## 2022-08-30 NOTE — ASSESSMENT & PLAN NOTE
Hx ROXANN, OHS -- needs to use bipap every night   Pt placed on Bipap this morning --- ABGs returned - 7.27/62/150   - will change settings and repeat labs in 1 hour   8/29 Awake and alert this am. Placing on nasal cannula during the day and bipap at night  On nasal cannula this am. Slept with the bipap overnight. Continue with current care

## 2022-08-31 LAB
ANION GAP SERPL CALCULATED.3IONS-SCNC: 15 MMOL/L (ref 7–16)
BUN SERPL-MCNC: 33 MG/DL (ref 7–18)
BUN/CREAT SERPL: 4 (ref 6–20)
CALCIUM SERPL-MCNC: 9.2 MG/DL (ref 8.5–10.1)
CHLORIDE SERPL-SCNC: 99 MMOL/L (ref 98–107)
CO2 SERPL-SCNC: 19 MMOL/L (ref 21–32)
CREAT SERPL-MCNC: 7.64 MG/DL (ref 0.55–1.02)
EGFR (NO RACE VARIABLE) (RUSH/TITUS): 5 ML/MIN/1.73M²
GLUCOSE SERPL-MCNC: 105 MG/DL (ref 70–105)
GLUCOSE SERPL-MCNC: 124 MG/DL (ref 70–105)
GLUCOSE SERPL-MCNC: 126 MG/DL (ref 74–106)
GLUCOSE SERPL-MCNC: 137 MG/DL (ref 70–105)
GLUCOSE SERPL-MCNC: 156 MG/DL (ref 70–105)
HCO3 UR-SCNC: 26.2 MMOL/L (ref 21–28)
PCO2 BLDA: 57 MMHG (ref 35–48)
PH SMN: 7.27 [PH] (ref 7.35–7.45)
PO2 BLDA: 119 MMHG (ref 83–108)
POC BASE EXCESS: -1.6 MMOL/L (ref -2–3)
POC SATURATED O2: 98 %
POTASSIUM SERPL-SCNC: 5.5 MMOL/L (ref 3.5–5.1)
SODIUM SERPL-SCNC: 127 MMOL/L (ref 136–145)

## 2022-08-31 PROCEDURE — 80048 BASIC METABOLIC PNL TOTAL CA: CPT | Performed by: INTERNAL MEDICINE

## 2022-08-31 PROCEDURE — 82803 BLOOD GASES ANY COMBINATION: CPT

## 2022-08-31 PROCEDURE — 80100014 HC HEMODIALYSIS 1:1

## 2022-08-31 PROCEDURE — 90935 HEMODIALYSIS ONE EVALUATION: CPT

## 2022-08-31 PROCEDURE — 94761 N-INVAS EAR/PLS OXIMETRY MLT: CPT

## 2022-08-31 PROCEDURE — 63600175 PHARM REV CODE 636 W HCPCS: Performed by: FAMILY MEDICINE

## 2022-08-31 PROCEDURE — 11000008

## 2022-08-31 PROCEDURE — 27000952

## 2022-08-31 PROCEDURE — 25000003 PHARM REV CODE 250: Performed by: FAMILY MEDICINE

## 2022-08-31 PROCEDURE — 27000940

## 2022-08-31 PROCEDURE — 99900035 HC TECH TIME PER 15 MIN (STAT)

## 2022-08-31 PROCEDURE — 99233 SBSQ HOSP IP/OBS HIGH 50: CPT | Mod: ,,, | Performed by: INTERNAL MEDICINE

## 2022-08-31 PROCEDURE — 99233 PR SUBSEQUENT HOSPITAL CARE,LEVL III: ICD-10-PCS | Mod: ,,, | Performed by: INTERNAL MEDICINE

## 2022-08-31 PROCEDURE — 27000221 HC OXYGEN, UP TO 24 HOURS

## 2022-08-31 PROCEDURE — 25000003 PHARM REV CODE 250: Performed by: INTERNAL MEDICINE

## 2022-08-31 PROCEDURE — 25000003 PHARM REV CODE 250: Performed by: NURSE PRACTITIONER

## 2022-08-31 PROCEDURE — 27000934

## 2022-08-31 PROCEDURE — 82962 GLUCOSE BLOOD TEST: CPT

## 2022-08-31 PROCEDURE — 94660 CPAP INITIATION&MGMT: CPT

## 2022-08-31 PROCEDURE — 36415 COLL VENOUS BLD VENIPUNCTURE: CPT | Performed by: INTERNAL MEDICINE

## 2022-08-31 RX ORDER — CHOLECALCIFEROL (VITAMIN D3) 25 MCG
5000 TABLET ORAL DAILY
Status: DISCONTINUED | OUTPATIENT
Start: 2022-09-01 | End: 2022-09-14 | Stop reason: HOSPADM

## 2022-08-31 RX ADMIN — NOREPINEPHRINE BITARTRATE 0.04 MCG/KG/MIN: 1 INJECTION, SOLUTION, CONCENTRATE INTRAVENOUS at 01:08

## 2022-08-31 RX ADMIN — COLLAGENASE SANTYL: 250 OINTMENT TOPICAL at 09:08

## 2022-08-31 RX ADMIN — HEPARIN SODIUM 4000 UNITS: 1000 INJECTION INTRAVENOUS; SUBCUTANEOUS at 04:08

## 2022-08-31 RX ADMIN — HYDROCODONE BITARTRATE AND ACETAMINOPHEN 1 TABLET: 10; 325 TABLET ORAL at 05:08

## 2022-08-31 RX ADMIN — SODIUM CHLORIDE 2000 ML: 9 INJECTION, SOLUTION INTRAVENOUS at 04:08

## 2022-08-31 RX ADMIN — HYDROCODONE BITARTRATE AND ACETAMINOPHEN 1 TABLET: 10; 325 TABLET ORAL at 01:08

## 2022-08-31 RX ADMIN — ONDANSETRON 4 MG: 2 INJECTION INTRAMUSCULAR; INTRAVENOUS at 08:08

## 2022-08-31 RX ADMIN — NOREPINEPHRINE BITARTRATE 0.04 MCG/KG/MIN: 1 SOLUTION INTRAVENOUS at 01:08

## 2022-08-31 NOTE — NURSING
Dr. Shi notified that pt is yelling, taking off Bipap, pulling at IV lines, and stating that she is going home. New orders given to place pt in restraints. Daughter called and she agreed to placed pt in restraints. Telephone consent obtained and education provided to ping Landon by CARLOZ Gaines RN and ANJELICA Rodriguez RN.

## 2022-08-31 NOTE — ASSESSMENT & PLAN NOTE
Hx ROXANN, OHS -- needs to use bipap every night   Pt placed on Bipap this morning --- ABGs returned - 7.27/62/150   - will change settings and repeat labs in 1 hour   8/29 Awake and alert this am. Placing on nasal cannula during the day and bipap at night  On nasal cannula this am. Slept with the bipap overnight. Continue with current care  8/31- Did not wear bipap overnight and now she is confused. I am going to check an ABG. Place back on bipap. She had an episode of emesis last night and they held the bipap. We are not going to be able to hold the bipap overnight or she is going to have issues with CO2 retention

## 2022-08-31 NOTE — ASSESSMENT & PLAN NOTE
BP very labile -- seems to get worse after dialysis - started on Levophed and transferred to [facility] last night  - midodrine increased yesterday    8/29 BP looks ok this am  Now hypertensive at times  8/31- she remains on pressor. Weaning as able. MAP goal of 60

## 2022-08-31 NOTE — PLAN OF CARE
Problem: Adult Inpatient Plan of Care  Goal: Plan of Care Review  Outcome: Ongoing, Progressing  Goal: Patient-Specific Goal (Individualized)  Outcome: Ongoing, Progressing  Goal: Absence of Hospital-Acquired Illness or Injury  Outcome: Ongoing, Progressing  Goal: Optimal Comfort and Wellbeing  Outcome: Ongoing, Progressing  Goal: Readiness for Transition of Care  Outcome: Ongoing, Progressing     Problem: Diabetes Comorbidity  Goal: Blood Glucose Level Within Targeted Range  Outcome: Ongoing, Progressing     Problem: Bariatric Environmental Safety  Goal: Safety Maintained with Care  Outcome: Ongoing, Progressing     Problem: Infection  Goal: Absence of Infection Signs and Symptoms  Outcome: Ongoing, Progressing     Problem: Impaired Wound Healing  Goal: Optimal Wound Healing  Outcome: Ongoing, Progressing     Problem: Device-Related Complication Risk (Hemodialysis)  Goal: Safe, Effective Therapy Delivery  Outcome: Ongoing, Progressing     Problem: Hemodynamic Instability (Hemodialysis)  Goal: Effective Tissue Perfusion  Outcome: Ongoing, Progressing     Problem: Infection (Hemodialysis)  Goal: Absence of Infection Signs and Symptoms  Outcome: Ongoing, Progressing     Problem: Fall Injury Risk  Goal: Absence of Fall and Fall-Related Injury  Outcome: Ongoing, Progressing     Problem: Skin Injury Risk Increased  Goal: Skin Health and Integrity  Outcome: Ongoing, Progressing     Problem: Noninvasive Ventilation Acute  Goal: Effective Unassisted Ventilation and Oxygenation  Outcome: Ongoing, Progressing     Problem: Restraint, Nonbehavioral (Nonviolent)  Goal: Absence of Harm or Injury  Outcome: Ongoing, Progressing     Problem: Gas Exchange Impaired  Goal: Optimal Gas Exchange  Outcome: Ongoing, Progressing

## 2022-08-31 NOTE — DIALYSIS ROUNDING
Mrs. Nunes is seen during her hemodialysis. She is wearing bipap but is requiring restraints. BP still chronically low on and off hemodialysis. Obesity hypoventilation complicating medical issues. We'll continue dialysis but she is making little progress given her multiple problems.

## 2022-08-31 NOTE — PT/OT/SLP PROGRESS
Occupational Therapy      Patient Name:  Michelle Nunes   MRN:  [id number]    Patient not seen today secondary to Dialysis Pt did not wear BiPAP last night, more confused and lethargic, CO2 retaining. Pt placed on BiPAP and was recieving dialysis this PM. Will follow-up 08/01/2022.    8/31/2022

## 2022-08-31 NOTE — SUBJECTIVE & OBJECTIVE
Interval History: The patient had an issue of emesis overnight and therefore she did not wear the bipap overnight. This am she is confused. She remains on a pressor. BP is elevated.    Objective:     Vital Signs (Most Recent):  Temp: 97.9 °F (36.6 °C) (08/31/22 0700)  Pulse: 86 (08/31/22 0700)  Resp: 10 (08/31/22 0700)  BP: 121/73 (08/31/22 0700)  SpO2: 95 % (08/31/22 0700)   Vital Signs (24h Range):  Temp:  [97.9 °F (36.6 °C)-98.4 °F (36.9 °C)] 97.9 °F (36.6 °C)  Pulse:  [] 86  Resp:  [7-20] 10  SpO2:  [81 %-100 %] 95 %  BP: ()/() 121/73     Weight: 132.2 kg (291 lb 7.2 oz)  Body mass index is 51.63 kg/m².      Intake/Output Summary (Last 24 hours) at 8/31/2022 0752  Last data filed at 8/31/2022 0129  Gross per 24 hour   Intake 420 ml   Output 200 ml   Net 220 ml         Physical Exam  Vitals and nursing note reviewed.   Constitutional:       General: She is not in acute distress.     Appearance: She is obese. She is not ill-appearing.   HENT:      Head: Normocephalic and atraumatic.      Right Ear: External ear normal.      Left Ear: External ear normal.      Mouth/Throat:      Mouth: Mucous membranes are moist.      Pharynx: Oropharynx is clear.   Eyes:      Extraocular Movements: Extraocular movements intact.      Conjunctiva/sclera: Conjunctivae normal.      Pupils: Pupils are equal, round, and reactive to light.   Cardiovascular:      Rate and Rhythm: Regular rhythm. Tachycardia present.      Heart sounds: Normal heart sounds.   Pulmonary:      Effort: No respiratory distress.      Breath sounds: Normal breath sounds. No wheezing or rales.   Abdominal:      General: Bowel sounds are normal.      Palpations: Abdomen is soft.   Musculoskeletal:         General: Normal range of motion.      Cervical back: Normal range of motion.      Comments: Left amputation    Skin:     General: Skin is warm.      Coloration: Skin is not pale.      Comments: Sacral, right heel, amputation site with wounds    Neurological:      General: No focal deficit present.      Mental Status: She is alert and oriented to person, place, and time. Mental status is at baseline.   Psychiatric:         Behavior: Behavior normal.       Vents:  Oxygen Concentration (%): 32 (08/30/22 1530)    Lines/Drains/Airways       Central Venous Catheter Line  Duration                  Hemodialysis Catheter left subclavian -- days              Peripheral Intravenous Line  Duration                  Peripheral IV - Single Lumen 08/28/22 1000 22 G Posterior;Right Wrist 2 days                    Significant Labs:    CBC/Anemia Profile:  No results for input(s): WBC, HGB, HCT, PLT, MCV, RDW, IRON, FERRITIN, RETIC, FOLATE, [id number], OCCULTBLOOD in the last 48 hours.     Chemistries:  Recent Labs   Lab 08/31/22  0248   *   K 5.5*   CL 99   CO2 19*   BUN 33*   CREATININE 7.64*   CALCIUM 9.2         All pertinent labs within the past 24 hours have been reviewed.    Significant Imaging:  I have reviewed all pertinent imaging results/findings within the past 24 hours.

## 2022-08-31 NOTE — PROGRESS NOTES
[facility]  Pulmonology  Progress Note    Patient Name: Michelle Nunes  MRN: [id number]  Admission Date: 8/27/2022  Hospital Length of Stay: 4 days  Code Status: Full Code  Attending Provider: Donis Shi DO  Primary Care Provider: Primary Doctor No   Principal Problem: Infection of amputation stump    Subjective:     Interval History: The patient had an issue of emesis overnight and therefore she did not wear the bipap overnight. This am she is confused. She remains on a pressor. BP is elevated.    Objective:     Vital Signs (Most Recent):  Temp: 97.9 °F (36.6 °C) (08/31/22 0700)  Pulse: 86 (08/31/22 0700)  Resp: 10 (08/31/22 0700)  BP: 121/73 (08/31/22 0700)  SpO2: 95 % (08/31/22 0700)   Vital Signs (24h Range):  Temp:  [97.9 °F (36.6 °C)-98.4 °F (36.9 °C)] 97.9 °F (36.6 °C)  Pulse:  [] 86  Resp:  [7-20] 10  SpO2:  [81 %-100 %] 95 %  BP: ()/() 121/73     Weight: 132.2 kg (291 lb 7.2 oz)  Body mass index is 51.63 kg/m².      Intake/Output Summary (Last 24 hours) at 8/31/2022 0752  Last data filed at 8/31/2022 0129  Gross per 24 hour   Intake 420 ml   Output 200 ml   Net 220 ml         Physical Exam  Vitals and nursing note reviewed.   Constitutional:       General: She is not in acute distress.     Appearance: She is obese. She is not ill-appearing.   HENT:      Head: Normocephalic and atraumatic.      Right Ear: External ear normal.      Left Ear: External ear normal.      Mouth/Throat:      Mouth: Mucous membranes are moist.      Pharynx: Oropharynx is clear.   Eyes:      Extraocular Movements: Extraocular movements intact.      Conjunctiva/sclera: Conjunctivae normal.      Pupils: Pupils are equal, round, and reactive to light.   Cardiovascular:      Rate and Rhythm: Regular rhythm. Tachycardia present.      Heart sounds: Normal heart sounds.   Pulmonary:      Effort: No respiratory distress.      Breath sounds: Normal breath sounds. No wheezing or  rales.   Abdominal:      General: Bowel sounds are normal.      Palpations: Abdomen is soft.   Musculoskeletal:         General: Normal range of motion.      Cervical back: Normal range of motion.      Comments: Left amputation    Skin:     General: Skin is warm.      Coloration: Skin is not pale.      Comments: Sacral, right heel, amputation site with wounds   Neurological:      General: No focal deficit present.      Mental Status: She is alert and oriented to person, place, and time. Mental status is at baseline.   Psychiatric:         Behavior: Behavior normal.       Vents:  Oxygen Concentration (%): 32 (08/30/22 1530)    Lines/Drains/Airways       Central Venous Catheter Line  Duration                  Hemodialysis Catheter left subclavian -- days              Peripheral Intravenous Line  Duration                  Peripheral IV - Single Lumen 08/28/22 1000 22 G Posterior;Right Wrist 2 days                    Significant Labs:    CBC/Anemia Profile:  No results for input(s): WBC, HGB, HCT, PLT, MCV, RDW, IRON, FERRITIN, RETIC, FOLATE, [id number], OCCULTBLOOD in the last 48 hours.     Chemistries:  Recent Labs   Lab 08/31/22  0248   *   K 5.5*   CL 99   CO2 19*   BUN 33*   CREATININE 7.64*   CALCIUM 9.2         All pertinent labs within the past 24 hours have been reviewed.    Significant Imaging:  I have reviewed all pertinent imaging results/findings within the past 24 hours.    Assessment/Plan:     * Infection of amputation stump  Wound care - cultures reviewed - sensitive to Azactam     Morbid obesity with BMI of 50.0-59.9, adult  Complicates all levels of care    Acute hypercapnic respiratory failure  Hx ROXANN, OHS -- needs to use bipap every night   Pt placed on Bipap this morning --- ABGs returned - 7.27/62/150   - will change settings and repeat labs in 1 hour   8/29 Awake and alert this am. Placing on nasal cannula during the day and bipap at night  On nasal cannula this am. Slept with the bipap  overnight. Continue with current care  8/31- Did not wear bipap overnight and now she is confused. I am going to check an ABG. Place back on bipap. She had an episode of emesis last night and they held the bipap. We are not going to be able to hold the bipap overnight or she is going to have issues with CO2 retention    Hypotension  BP very labile -- seems to get worse after dialysis - started on Levophed and transferred to [facility] last night  - midodrine increased yesterday    8/29 BP looks ok this am  Now hypertensive at times  8/31- she remains on pressor. Weaning as able. MAP goal of 60    Atrial fibrillation, controlled  Rate controlled- continue eliquis    Pressure injury of buttock, unstageable  Continue with wound care    Type 2 diabetes mellitus  Glucose <180  Continue with current care  She is on 10 units of levemir and mild sliding scale    ESRD on dialysis  Continue with dialysis per nephrology  T/TH/ Sat    Deep vein thrombosis (DVT) of lower extremity  Dopplers negative here   She is on eliquis 2.5 bid    Diabetic ulcer of right heel associated with type 2 diabetes mellitus  Continue with wound care                  Donis Shi, DO  Pulmonology  [facility]

## 2022-09-01 LAB
BACTERIA SPEC ANAEROBE CULT: NORMAL
GLUCOSE SERPL-MCNC: 114 MG/DL (ref 70–105)
GLUCOSE SERPL-MCNC: 114 MG/DL (ref 70–105)
GLUCOSE SERPL-MCNC: 126 MG/DL (ref 70–105)
GLUCOSE SERPL-MCNC: 99 MG/DL (ref 70–105)
MICROORGANISM SPEC CULT: ABNORMAL

## 2022-09-01 PROCEDURE — 25000003 PHARM REV CODE 250: Performed by: FAMILY MEDICINE

## 2022-09-01 PROCEDURE — 82962 GLUCOSE BLOOD TEST: CPT

## 2022-09-01 PROCEDURE — 94761 N-INVAS EAR/PLS OXIMETRY MLT: CPT

## 2022-09-01 PROCEDURE — 25000003 PHARM REV CODE 250: Performed by: NURSE PRACTITIONER

## 2022-09-01 PROCEDURE — 11000008

## 2022-09-01 PROCEDURE — 11042 DEBRIDEMENT: ICD-10-PCS | Mod: ,,, | Performed by: NURSE PRACTITIONER

## 2022-09-01 PROCEDURE — 99900035 HC TECH TIME PER 15 MIN (STAT)

## 2022-09-01 PROCEDURE — 27000934

## 2022-09-01 PROCEDURE — 27000221 HC OXYGEN, UP TO 24 HOURS

## 2022-09-01 PROCEDURE — 11042 DBRDMT SUBQ TIS 1ST 20SQCM/<: CPT | Mod: ,,, | Performed by: NURSE PRACTITIONER

## 2022-09-01 PROCEDURE — 25000003 PHARM REV CODE 250: Performed by: INTERNAL MEDICINE

## 2022-09-01 PROCEDURE — 27000940

## 2022-09-01 PROCEDURE — 11045 DBRDMT SUBQ TISS EACH ADDL: CPT | Mod: ,,, | Performed by: NURSE PRACTITIONER

## 2022-09-01 PROCEDURE — 27000952

## 2022-09-01 PROCEDURE — 99233 SBSQ HOSP IP/OBS HIGH 50: CPT | Mod: ,,, | Performed by: INTERNAL MEDICINE

## 2022-09-01 PROCEDURE — 11045 DEBRIDEMENT: ICD-10-PCS | Mod: ,,, | Performed by: NURSE PRACTITIONER

## 2022-09-01 PROCEDURE — 99233 PR SUBSEQUENT HOSPITAL CARE,LEVL III: ICD-10-PCS | Mod: ,,, | Performed by: INTERNAL MEDICINE

## 2022-09-01 PROCEDURE — 97530 THERAPEUTIC ACTIVITIES: CPT | Mod: CO

## 2022-09-01 PROCEDURE — 94660 CPAP INITIATION&MGMT: CPT

## 2022-09-01 RX ADMIN — FOLIC ACID 1000 MCG: 1 TABLET ORAL at 08:09

## 2022-09-01 RX ADMIN — APIXABAN 2.5 MG: 2.5 TABLET, FILM COATED ORAL at 09:09

## 2022-09-01 RX ADMIN — ASPIRIN 81 MG: 81 TABLET, COATED ORAL at 08:09

## 2022-09-01 RX ADMIN — ESCITALOPRAM OXALATE 10 MG: 10 TABLET ORAL at 09:09

## 2022-09-01 RX ADMIN — NOREPINEPHRINE BITARTRATE 0.05 MCG/KG/MIN: 1 SOLUTION INTRAVENOUS at 08:09

## 2022-09-01 RX ADMIN — HYDROCORTISONE ACETATE 25 MG: 25 SUPPOSITORY RECTAL at 08:09

## 2022-09-01 RX ADMIN — PANTOPRAZOLE SODIUM 40 MG: 40 TABLET, DELAYED RELEASE ORAL at 08:09

## 2022-09-01 RX ADMIN — HYDROCODONE BITARTRATE AND ACETAMINOPHEN 1 TABLET: 10; 325 TABLET ORAL at 05:09

## 2022-09-01 RX ADMIN — MIDODRINE HYDROCHLORIDE 10 MG: 5 TABLET ORAL at 09:09

## 2022-09-01 RX ADMIN — MEGESTROL ACETATE 40 MG: 40 TABLET ORAL at 08:09

## 2022-09-01 RX ADMIN — ATORVASTATIN CALCIUM 40 MG: 40 TABLET, FILM COATED ORAL at 09:09

## 2022-09-01 RX ADMIN — APIXABAN 2.5 MG: 2.5 TABLET, FILM COATED ORAL at 08:09

## 2022-09-01 RX ADMIN — MIDODRINE HYDROCHLORIDE 10 MG: 5 TABLET ORAL at 08:09

## 2022-09-01 RX ADMIN — LEVOTHYROXINE SODIUM 100 MCG: 100 TABLET ORAL at 08:09

## 2022-09-01 RX ADMIN — HYDROCORTISONE ACETATE 25 MG: 25 SUPPOSITORY RECTAL at 09:09

## 2022-09-01 RX ADMIN — GABAPENTIN 200 MG: 100 CAPSULE ORAL at 09:09

## 2022-09-01 RX ADMIN — HYDROCODONE BITARTRATE AND ACETAMINOPHEN 1 TABLET: 10; 325 TABLET ORAL at 08:09

## 2022-09-01 RX ADMIN — Medication 3 MG: at 09:09

## 2022-09-01 RX ADMIN — Medication 5000 UNITS: at 08:09

## 2022-09-01 RX ADMIN — COLLAGENASE SANTYL: 250 OINTMENT TOPICAL at 05:09

## 2022-09-01 RX ADMIN — ALLOPURINOL 100 MG: 100 TABLET ORAL at 08:09

## 2022-09-01 NOTE — PROGRESS NOTES
[facility]  Nephrology  Progress Note    Patient Name: Michelle Nunes  MRN: [id number]  Admission Date: 8/27/2022  Hospital Length of Stay: 5 days  Attending Provider: Donis Shi DO   Primary Care Physician: Primary Doctor No  Principal Problem:Infection of amputation stump    Consults  Subjective:     Interval History: F/U ESRD. Alert. BP lowish and still on Levophed. Talkative    Review of patient's allergies indicates:   Allergen Reactions    Milk containing products     Pcn [penicillins]      Current Facility-Administered Medications   Medication Frequency    0.9%  NaCl infusion PRN    acetaminophen tablet 650 mg Q4H PRN    allopurinoL tablet 100 mg Daily    apixaban tablet 2.5 mg BID    aspirin EC tablet 81 mg Daily    atorvastatin tablet 40 mg QHS    bisacodyL EC tablet 10 mg Daily PRN    collagenase ointment Daily PRN    dextromethorphan-guaiFENesin  mg/5 ml liquid 10 mL Q6H PRN    dextrose 50% injection 12.5 g PRN    dextrose 50% injection 25 g PRN    EScitalopram oxalate tablet 10 mg QHS    folic acid tablet 1,000 mcg QAM    gabapentin capsule 200 mg QHS    glucagon (human recombinant) injection 1 mg PRN    heparin (porcine) injection 4,000 Units PRN    HYDROcodone-acetaminophen  mg per tablet 1 tablet Q8H PRN    hydrocortisone suppository 25 mg BID    insulin aspart U-100 injection 0-5 Units QID (AC + HS) PRN    insulin detemir U-100 injection 10 Units QHS    levothyroxine tablet 100 mcg QAM    megestroL tablet 40 mg QAM    melatonin tablet 3 mg QHS    midodrine tablet 10 mg TID    NORepinephrine 8 mg in dextrose 5 % 250 mL infusion Continuous    ondansetron injection 4 mg Q8H PRN    pantoprazole EC tablet 40 mg Daily    simethicone chewable tablet 80 mg TID PRN    sodium chloride 0.9% flush 10 mL Q12H PRN    traZODone tablet 50 mg Nightly PRN    vitamin D 1000 units tablet 5,000 Units Daily       Objective:     Vital Signs (Most Recent):  Temp: 98.3  °F (36.8 °C) (09/01/22 1150)  Pulse: 103 (09/01/22 1230)  Resp: 10 (09/01/22 1230)  BP: (!) 149/131 (09/01/22 1215)  SpO2: 100 % (09/01/22 1230)  O2 Device (Oxygen Therapy): nasal cannula (09/01/22 0930)   Vital Signs (24h Range):  Temp:  [97 °F (36.1 °C)-99.2 °F (37.3 °C)] 98.3 °F (36.8 °C)  Pulse:  [] 103  Resp:  [8-20] 10  SpO2:  [76 %-100 %] 100 %  BP: ()/() 149/131     Weight: 132.2 kg (291 lb 7.2 oz) (08/31/22 0600)  Body mass index is 51.63 kg/m².  Body surface area is 2.42 meters squared.    I/O last 3 completed shifts:  In: 870 [P.O.:120; I.V.:250; Other:500]  Out: 2000 [Other:2000]    Physical Exam Obese. No resp distress on nasal O2    Significant Labs:sureBMP:   Recent Labs   Lab 08/27/22  0538 08/29/22  0710 08/31/22  0248   *   < > 126*   *   < > 127*   K 4.1   < > 5.5*   CL 98   < > 99   CO2 26   < > 19*   BUN 25*   < > 33*   CREATININE 6.65*   < > 7.64*   CALCIUM 9.0   < > 9.2   MG 1.7  --   --     < > = values in this interval not displayed.     CBC:   Recent Labs   Lab 08/27/22  0538   WBC 15.24*   RBC 2.79*   HGB 8.6*   HCT 27.7*      MCV 99.3*   MCH 30.8   MCHC 31.0*     All labs within the past 24 hours have been reviewed.    Significant Imaging:  Labs: Reviewed    Assessment/Plan:     Active Diagnoses:    Diagnosis Date Noted POA    PRINCIPAL PROBLEM:  Infection of amputation stump [T87.40] 08/25/2022 Yes    Morbid obesity with BMI of 50.0-59.9, adult [E66.01, Z68.43] 08/29/2022 Not Applicable    Acute hypercapnic respiratory failure [J96.02] 08/28/2022 Yes    PVD (peripheral vascular disease) [I73.9] 08/27/2022 Unknown    Hypotension [I95.9] 08/26/2022 Yes    Diabetic ulcer of right heel associated with type 2 diabetes mellitus [E11.621, L97.419] 08/25/2022 Yes    Deep vein thrombosis (DVT) of lower extremity [I82.409] 08/25/2022 Yes    ESRD on dialysis [N18.6, Z99.2] 08/25/2022 Not Applicable    Pressure injury of buttock, unstageable [L89.300] 08/25/2022  Yes    Atrial fibrillation, controlled [I48.91] 08/25/2022 Yes    Type 2 diabetes mellitus [E11.9] 08/25/2022 Yes      Problems Resolved During this Admission:    Diagnosis Date Noted Date Resolved POA    Apnea [R06.81] 08/27/2022 08/28/2022 Yes       Dialysis tomorrow. She just had bedside debridement of her heel. She chronically has lowish bp. Usually well tolerated    Thank you for your consult. I will follow-up with patient. Please contact us if you have any additional questions.    Haile Rodriguez MD  Nephrology  [facility]

## 2022-09-01 NOTE — NURSING
[facility] [phone] call and states pt needs to follow with them at discharge. SS notified.

## 2022-09-01 NOTE — SUBJECTIVE & OBJECTIVE
Interval History: No acute events overnight. The patient is currently resting comfortably this am on bipap. She is back to her baseline from a mental status standpoint. She is afebrile and vital signs are stable.    Objective:     Vital Signs (Most Recent):  Temp: 98.8 °F (37.1 °C) (09/01/22 0400)  Pulse: 98 (09/01/22 0415)  Resp: 12 (09/01/22 0415)  BP: 92/68 (09/01/22 0415)  SpO2: 97 % (09/01/22 0447)   Vital Signs (24h Range):  Temp:  [97 °F (36.1 °C)-98.9 °F (37.2 °C)] 98.8 °F (37.1 °C)  Pulse:  [] 98  Resp:  [9-20] 12  SpO2:  [76 %-100 %] 97 %  BP: ()/() 92/68     Weight: 132.2 kg (291 lb 7.2 oz)  Body mass index is 51.63 kg/m².      Intake/Output Summary (Last 24 hours) at 9/1/2022 0749  Last data filed at 8/31/2022 1645  Gross per 24 hour   Intake 500 ml   Output 2000 ml   Net -1500 ml         Physical Exam  Vitals and nursing note reviewed.   Constitutional:       General: She is not in acute distress.     Appearance: She is obese. She is not ill-appearing.   HENT:      Head: Normocephalic and atraumatic.      Right Ear: External ear normal.      Left Ear: External ear normal.      Mouth/Throat:      Mouth: Mucous membranes are moist.      Pharynx: Oropharynx is clear.   Eyes:      Extraocular Movements: Extraocular movements intact.      Conjunctiva/sclera: Conjunctivae normal.      Pupils: Pupils are equal, round, and reactive to light.   Cardiovascular:      Rate and Rhythm: Regular rhythm. Tachycardia present.      Heart sounds: Normal heart sounds.   Pulmonary:      Effort: No respiratory distress.      Breath sounds: Normal breath sounds. No wheezing or rales.   Abdominal:      General: Bowel sounds are normal.      Palpations: Abdomen is soft.   Musculoskeletal:         General: Normal range of motion.      Cervical back: Normal range of motion.      Comments: Left amputation    Skin:     General: Skin is warm.      Coloration: Skin is not pale.      Comments: Sacral, right heel,  amputation site with wounds   Neurological:      General: No focal deficit present.      Mental Status: She is alert and oriented to person, place, and time. Mental status is at baseline.   Psychiatric:         Behavior: Behavior normal.       Vents:  Oxygen Concentration (%): 30 (09/01/22 0447)    Lines/Drains/Airways       Central Venous Catheter Line  Duration                  Hemodialysis Catheter left subclavian -- days              Peripheral Intravenous Line  Duration                  Peripheral IV - Single Lumen 08/28/22 1000 22 G Posterior;Right Wrist 3 days                    Significant Labs:    CBC/Anemia Profile:  No results for input(s): WBC, HGB, HCT, PLT, MCV, RDW, IRON, FERRITIN, RETIC, FOLATE, [id number], OCCULTBLOOD in the last 48 hours.     Chemistries:  Recent Labs   Lab 08/31/22  0248   *   K 5.5*   CL 99   CO2 19*   BUN 33*   CREATININE 7.64*   CALCIUM 9.2         All pertinent labs within the past 24 hours have been reviewed.    Significant Imaging:  I have reviewed all pertinent imaging results/findings within the past 24 hours.

## 2022-09-01 NOTE — PT/OT/SLP PROGRESS
Occupational Therapy   Treatment    Name: Michelle Nunes  MRN: [id number]  Admitting Diagnosis:  Infection of amputation stump       Recommendations:     Discharge Recommendations: home with home health  Discharge Equipment Recommendations:  none  Barriers to discharge:       Assessment:     Michelle Nunes is a 66 y.o. female with a medical diagnosis of Infection of amputation stump.  Performance deficits affecting function are weakness, impaired endurance, impaired self care skills, decreased ROM, decreased upper extremity function, impaired cognition.     Rehab Prognosis:  Fair; patient would benefit from acute skilled OT services to address these deficits and reach maximum level of function.       Plan:     Patient to be seen 5 x/week to address the above listed problems via self-care/home management, therapeutic activities, therapeutic exercises  Plan of Care Expires: 09/05/22  Plan of Care Reviewed with: patient    Subjective     Pain/Comfort:       Objective:     Communicated with: JAY Gaines prior to session.  Patient found right sidelyingwith pulse ox (continuous), telemetry, oxygen, peripheral IV, blood pressure cuff upon OT entry to room.    General Precautions: Standard, fall, respiratory   Orthopedic Precautions:N/A   Braces: N/A  Respiratory Status: Nasal cannula, flow 3 L/min     Occupational Performance:     Bed Mobility:        Functional Mobility/Transfers:    Functional Mobility:     Activities of Daily Living:        [facility] 6 Click ADL:      Treatment & Education:  Pt performed hand helper with 1 rubber band resistances 10 reps  x 2, aarom with 1 lb  wt 10 reps x 2 B elbow flex/ext,abd/add. Pt required v/c's to remain on task/awake and frequent rest breaks.      Patient left right sidelying with all lines intact    GOALS:   Multidisciplinary Problems       Occupational Therapy Goals          Problem: Occupational Therapy    Goal Priority Disciplines Outcome Interventions   Occupational  Therapy Goal     OT, PT/OT Ongoing, Not Progressing    Description: STG:  Pt will perform grooming with setup  Pt will bathe with setup and min(A) for upper body anterior  Pt will perform UE dressing with setup  Pt will perform self- feeding with setup  Pt will perform HEP independently  Pt will tolerate 20 minutes of tx without fatigue      LT.Restore to max I with self care and mobility.                          Time Tracking:     OT Date of Treatment: 22  OT Start Time: 1439  OT Stop Time: 1500  OT Total Time (min): 21 min    Billable Minutes:Therapeutic Exercise 8    OT/TARYN: TARYN          2022

## 2022-09-01 NOTE — NURSING
Dr. Shi made rounds at this time. States to place pt on NC at 3L and d/c restraints at this time. Pt is to wear BiPap at night.

## 2022-09-01 NOTE — ASSESSMENT & PLAN NOTE
BP very labile -- seems to get worse after dialysis - started on Levophed and transferred to [facility] last night  - midodrine increased yesterday    8/29 BP looks ok this am  Now hypertensive at times  8/31- she remains on pressor. Weaning as able. MAP goal of 60  9/1- likely not getting accurate blood pressures as she has wide fluctuations in her pressures. She remains on some levophed

## 2022-09-01 NOTE — ASSESSMENT & PLAN NOTE
Hx ROXANN, OHS -- needs to use bipap every night   Pt placed on Bipap this morning --- ABGs returned - 7.27/62/150   - will change settings and repeat labs in 1 hour   8/29 Awake and alert this am. Placing on nasal cannula during the day and bipap at night  On nasal cannula this am. Slept with the bipap overnight. Continue with current care  8/31- Did not wear bipap overnight and now she is confused. I am going to check an ABG. Place back on bipap. She had an episode of emesis last night and they held the bipap. We are not going to be able to hold the bipap overnight or she is going to have issues with CO2 retention  9/1- better this am. We are going to place back on nasal cannula this am. She will continue with bipap at night.

## 2022-09-01 NOTE — PROGRESS NOTES
[facility]  Pulmonology  Progress Note    Patient Name: Michelle Nunes  MRN: [id number]  Admission Date: 8/27/2022  Hospital Length of Stay: 5 days  Code Status: Full Code  Attending Provider: Donis Shi DO  Primary Care Provider: Primary Doctor No   Principal Problem: Infection of amputation stump    Subjective:     Interval History: No acute events overnight. The patient is currently resting comfortably this am on bipap. She is back to her baseline from a mental status standpoint. She is afebrile and vital signs are stable.    Objective:     Vital Signs (Most Recent):  Temp: 98.8 °F (37.1 °C) (09/01/22 0400)  Pulse: 98 (09/01/22 0415)  Resp: 12 (09/01/22 0415)  BP: 92/68 (09/01/22 0415)  SpO2: 97 % (09/01/22 0447)   Vital Signs (24h Range):  Temp:  [97 °F (36.1 °C)-98.9 °F (37.2 °C)] 98.8 °F (37.1 °C)  Pulse:  [] 98  Resp:  [9-20] 12  SpO2:  [76 %-100 %] 97 %  BP: ()/() 92/68     Weight: 132.2 kg (291 lb 7.2 oz)  Body mass index is 51.63 kg/m².      Intake/Output Summary (Last 24 hours) at 9/1/2022 0749  Last data filed at 8/31/2022 1645  Gross per 24 hour   Intake 500 ml   Output 2000 ml   Net -1500 ml         Physical Exam  Vitals and nursing note reviewed.   Constitutional:       General: She is not in acute distress.     Appearance: She is obese. She is not ill-appearing.   HENT:      Head: Normocephalic and atraumatic.      Right Ear: External ear normal.      Left Ear: External ear normal.      Mouth/Throat:      Mouth: Mucous membranes are moist.      Pharynx: Oropharynx is clear.   Eyes:      Extraocular Movements: Extraocular movements intact.      Conjunctiva/sclera: Conjunctivae normal.      Pupils: Pupils are equal, round, and reactive to light.   Cardiovascular:      Rate and Rhythm: Regular rhythm. Tachycardia present.      Heart sounds: Normal heart sounds.   Pulmonary:      Effort: No respiratory distress.      Breath sounds: Normal  breath sounds. No wheezing or rales.   Abdominal:      General: Bowel sounds are normal.      Palpations: Abdomen is soft.   Musculoskeletal:         General: Normal range of motion.      Cervical back: Normal range of motion.      Comments: Left amputation    Skin:     General: Skin is warm.      Coloration: Skin is not pale.      Comments: Sacral, right heel, amputation site with wounds   Neurological:      General: No focal deficit present.      Mental Status: She is alert and oriented to person, place, and time. Mental status is at baseline.   Psychiatric:         Behavior: Behavior normal.       Vents:  Oxygen Concentration (%): 30 (09/01/22 0447)    Lines/Drains/Airways       Central Venous Catheter Line  Duration                  Hemodialysis Catheter left subclavian -- days              Peripheral Intravenous Line  Duration                  Peripheral IV - Single Lumen 08/28/22 1000 22 G Posterior;Right Wrist 3 days                    Significant Labs:    CBC/Anemia Profile:  No results for input(s): WBC, HGB, HCT, PLT, MCV, RDW, IRON, FERRITIN, RETIC, FOLATE, [id number], OCCULTBLOOD in the last 48 hours.     Chemistries:  Recent Labs   Lab 08/31/22  0248   *   K 5.5*   CL 99   CO2 19*   BUN 33*   CREATININE 7.64*   CALCIUM 9.2         All pertinent labs within the past 24 hours have been reviewed.    Significant Imaging:  I have reviewed all pertinent imaging results/findings within the past 24 hours.    Assessment/Plan:     * Infection of amputation stump  Wound care - cultures reviewed - sensitive to Azactam     Morbid obesity with BMI of 50.0-59.9, adult  Complicates all levels of care    Acute hypercapnic respiratory failure  Hx ROXANN, OHS -- needs to use bipap every night   Pt placed on Bipap this morning --- ABGs returned - 7.27/62/150   - will change settings and repeat labs in 1 hour   8/29 Awake and alert this am. Placing on nasal cannula during the day and bipap at night  On nasal cannula this  am. Slept with the bipap overnight. Continue with current care  8/31- Did not wear bipap overnight and now she is confused. I am going to check an ABG. Place back on bipap. She had an episode of emesis last night and they held the bipap. We are not going to be able to hold the bipap overnight or she is going to have issues with CO2 retention  9/1- better this am. We are going to place back on nasal cannula this am. She will continue with bipap at night.    Hypotension  BP very labile -- seems to get worse after dialysis - started on Levophed and transferred to [facility] last night  - midodrine increased yesterday    8/29 BP looks ok this am  Now hypertensive at times  8/31- she remains on pressor. Weaning as able. MAP goal of 60  9/1- likely not getting accurate blood pressures as she has wide fluctuations in her pressures. She remains on some levophed    Atrial fibrillation, controlled  Rate controlled- continue eliquis    Pressure injury of buttock, unstageable  Continue with wound care    Type 2 diabetes mellitus  Glucose <180  Continue with current care  She is on 10 units of levemir and mild sliding scale    ESRD on dialysis  Continue with dialysis per nephrology  T/TH/ Sat    Deep vein thrombosis (DVT) of lower extremity  Dopplers negative here   She is on eliquis 2.5 bid    Diabetic ulcer of right heel associated with type 2 diabetes mellitus  Continue with wound care                  Donis Shi, DO  Pulmonology  [facility]

## 2022-09-01 NOTE — PROGRESS NOTES
[facility]  Wound Care  Progress Note    Patient Name: Michelle Nunes  MRN: [id number]  Admission Date: 8/27/2022  Attending Physician: Donis Shi DO    Past Medical History:   Diagnosis Date    A-fib     Blind right eye     Diabetes mellitus     ESRD (end stage renal disease) on dialysis     GERD (gastroesophageal reflux disease)     Gout, unspecified     Heart attack     HTN (hypertension)     Kidney failure     Neuropathy         Subjective:     HPI:  Michelle Nunes is a 65 yo female with diabetic ulcer to right heel, dehisced surgical wound to left AKA, and pressure injury to buttock. She presented to emergency department with right lower extremity pain. Necrotic tissue to right heel, maceration periwound. X-ray, Plantar greater than posterior calcaneal spurring.  No acute fracture or dislocation.  Atherosclerotic calcification demonstrated. Bedside debridement. Strong odor noted to wound. Pt had left AKA on 5-, Dr. Cross at [facility]. Past medical history includes hypertension, ESRD on hemodialysis, atrial fibrillation,  and diabetes mellitus. She was hospitalized on 7/2/2022 with infection of the left AKA.  She was on IV antibiotics and  Wound VAC was placed. Reports she was diagnosed with DVT of her RLE at [facility] on 8/18. Venous doppler on 8/24 is negative for DVT. Wound healing is complicated by diabetes, a-fib, limited mobility, infection, anemia, and obesity.        Review of Systems   Constitutional:  Positive for activity change. Negative for chills and fever.   Respiratory:  Negative for chest tightness and shortness of breath.    Cardiovascular:  Positive for leg swelling. Negative for chest pain and palpitations.   Musculoskeletal:  Positive for arthralgias and joint swelling.   Skin:  Positive for wound.        wound   Neurological:  Positive for weakness.   Psychiatric/Behavioral:  Negative for agitation, behavioral problems, confusion and self-injury.     Objective:     Vital Signs (Most Recent):  Temp: 99.2 °F (37.3 °C) (09/01/22 0800)  Pulse: 105 (09/01/22 1000)  Resp: 12 (09/01/22 1000)  BP: 109/85 (09/01/22 1000)  SpO2: 95 % (09/01/22 1000) Vital Signs (24h Range):  Temp:  [97 °F (36.1 °C)-99.2 °F (37.3 °C)] 99.2 °F (37.3 °C)  Pulse:  [] 105  Resp:  [8-20] 12  SpO2:  [76 %-100 %] 95 %  BP: ()/() 109/85     Weight: 132.2 kg (291 lb 7.2 oz)  Body mass index is 51.63 kg/m².  No data recorded    Physical Exam  Vitals reviewed.   Constitutional:       Appearance: She is obese.   HENT:      Head: Normocephalic.   Cardiovascular:      Rate and Rhythm: Normal rate and regular rhythm.   Pulmonary:      Effort: Pulmonary effort is normal.   Musculoskeletal:         General: Swelling, tenderness and deformity present.      Right lower leg: Edema present.      Comments: Left AKA   Skin:     Findings: Erythema present.      Comments: Wound, see LDA for photo/measurements   Neurological:      Mental Status: She is alert. Mental status is at baseline.      Motor: Weakness present.      Gait: Gait abnormal.             Altered Skin Integrity 08/24/22 Right Heel Ulceration (Active)   08/24/22    Altered Skin Integrity Present on Admission: yes   Side: Right   Orientation:    Location: Heel   Wound Number:    Is this injury device related?:    Primary Wound Type: Ulceration   Description of Altered Skin Integrity:    Ankle-Brachial Index:    Pulses:    Removal Indication and Assessment:    Wound Outcome:    (Retired) Wound Length (cm):    (Retired) Wound Width (cm):    (Retired) Depth (cm):    Wound Description (Comments):    Removal Indications:    Wound Image     09/01/22 1207   Description of Altered Skin Integrity Full thickness tissue loss. Base is covered by slough and/or eschar in the wound bed 09/01/22 1207   Dressing Appearance Moist drainage 09/01/22 1207   Drainage Amount Small 09/01/22 1207   Drainage Characteristics/Odor Serous;Tan;Other (see  comments) 09/01/22 1207   Appearance Maroon;[place];Slough;Moist 09/01/22 1207   Tissue loss description Not applicable 09/01/22 1207   Black (%), Wound Tissue Color 80 % 08/29/22 1130   Red (%), Wound Tissue Color 0 % 08/29/22 1130   Yellow (%), Wound Tissue Color 20 % 08/29/22 1130   Periwound Area Moist 09/01/22 1207   Wound Edges Defined;Open 09/01/22 1207   Wound Length (cm) 5 cm 08/29/22 1130   Wound Width (cm) 5 cm 08/29/22 1130   Wound Depth (cm) 0 cm 08/29/22 1130   Wound Volume (cm^3) 0 cm^3 08/29/22 1130   Wound Surface Area (cm^2) 25 cm^2 08/29/22 1130   Care Cleansed with:;Soap and water;Wound cleanser;Applied:;Removed:;Skin Barrier;Moisturizing agent 09/01/22 1207   Dressing Removed;Applied;Changed;Gauze, wet to moist;Collagen;Gauze;Rolled gauze 09/01/22 1207   Periwound Care Moisture barrier applied;Skin barrier film applied 09/01/22 1207   Dressing Change Due 09/02/22 09/01/22 1207   Number of days: 9            Altered Skin Integrity 08/24/22 Left Buttocks Ulceration (Active)   08/24/22    Altered Skin Integrity Present on Admission: yes   Side: Left   Orientation:    Location: Buttocks   Wound Number:    Is this injury device related?:    Primary Wound Type: Ulceration   Description of Altered Skin Integrity:    Ankle-Brachial Index:    Pulses:    Removal Indication and Assessment:    Wound Outcome:    (Retired) Wound Length (cm):    (Retired) Wound Width (cm):    (Retired) Depth (cm):    Wound Description (Comments):    Removal Indications:    Wound Image    08/29/22 1130   Description of Altered Skin Integrity Full thickness tissue loss. Subcutaneous fat may be visible but bone, tendon or muscle are not exposed 08/29/22 1130   Dressing Appearance Intact 08/31/22 0000   Drainage Amount Small 08/30/22 1130   Drainage Characteristics/Odor Serosanguineous 08/30/22 1130   Appearance Pink;Tan;Slough;Moist 08/30/22 1130   Tissue loss description Full thickness 08/30/22 1130   Black (%), Wound Tissue Color 0  % 08/29/22 1130   Red (%), Wound Tissue Color 50 % 08/29/22 1130   Yellow (%), Wound Tissue Color 50 % 08/29/22 1130   Periwound Area Excoriated;[place] 08/30/22 1130   Wound Edges Undefined 08/30/22 1130   Wound Length (cm) 4 cm 08/29/22 1130   Wound Width (cm) 3 cm 08/29/22 1130   Wound Depth (cm) 0.2 cm 08/29/22 1130   Wound Volume (cm^3) 2.4 cm^3 08/29/22 1130   Wound Surface Area (cm^2) 12 cm^2 08/29/22 1130   Care Cleansed with:;Soap and water;Wound cleanser;Applied:;Removed:;Skin Barrier 08/30/22 1130   Dressing Hydrocolloid 09/01/22 1207   Dressing Change Due 09/02/22 09/01/22 1207   Number of days: 9            Altered Skin Integrity 08/25/22 0206 Right Buttocks Ulceration (Active)   08/25/22 0206   Altered Skin Integrity Present on Admission: yes   Side: Right   Orientation:    Location: Buttocks   Wound Number:    Is this injury device related?:    Primary Wound Type: Ulceration   Description of Altered Skin Integrity:    Ankle-Brachial Index:    Pulses:    Removal Indication and Assessment:    Wound Outcome:    (Retired) Wound Length (cm):    (Retired) Wound Width (cm):    (Retired) Depth (cm):    Wound Description (Comments):    Removal Indications:    Wound Image    08/29/22 1130   Description of Altered Skin Integrity Full thickness tissue loss. Base is covered by slough and/or eschar in the wound bed 08/29/22 1130   Dressing Appearance Moist drainage 09/01/22 1207   Drainage Amount Scant 09/01/22 1207   Drainage Characteristics/Odor Serosanguineous;No odor 09/01/22 1207   Appearance Red;Black;Moist;Eschar 09/01/22 1207   Tissue loss description Not applicable 09/01/22 1207   Black (%), Wound Tissue Color 70 % 08/29/22 1130   Red (%), Wound Tissue Color 30 % 08/29/22 1130   Yellow (%), Wound Tissue Color 0 % 08/29/22 1130   Periwound Area Dry 09/01/22 1207   Wound Edges Defined;[place] 09/01/22 1207   Wound Length (cm) 4 cm 08/29/22 1130   Wound Width (cm) 4.5 cm 08/29/22 1130   Wound Depth (cm) 0.2 cm  08/29/22 1130   Wound Volume (cm^3) 3.6 cm^3 08/29/22 1130   Wound Surface Area (cm^2) 18 cm^2 08/29/22 1130   Care Cleansed with:;Soap and water;Wound cleanser;Applied:;Removed:;Skin Barrier;Moisturizing agent 09/01/22 1207   Dressing Removed;Applied;Changed;Gauze, wet to moist;Collagen;Gauze 09/01/22 1207   Dressing Change Due 09/02/22 09/01/22 1207   Number of days: 8            Altered Skin Integrity 08/27/22 1745 Left anterior Thigh (Active)   08/27/22 1745   Altered Skin Integrity Present on Admission:    Side: Left   Orientation: anterior   Location: Thigh   Wound Number:    Is this injury device related?:    Primary Wound Type:    Description of Altered Skin Integrity:    Ankle-Brachial Index:    Pulses:    Removal Indication and Assessment:    Wound Outcome:    (Retired) Wound Length (cm):    (Retired) Wound Width (cm):    (Retired) Depth (cm):    Wound Description (Comments):    Removal Indications:    Dressing Appearance Open to air 08/30/22 0730   Drainage Amount None 08/30/22 0730   Appearance Pink 08/30/22 0730   Care Cleansed with:;Soap and water 08/31/22 1020   Dressing Applied;Other (comment) 08/31/22 1020   Number of days: 6            Altered Skin Integrity 08/29/22 1130 posterior Perineum Incontinence associated dermatitis (Active)   08/29/22 1130   Altered Skin Integrity Present on Admission: yes   Side:    Orientation: posterior   Location: Perineum   Wound Number:    Is this injury device related?: No   Primary Wound Type: Incontinence   Description of Altered Skin Integrity:    Ankle-Brachial Index:    Pulses:    Removal Indication and Assessment:    Wound Outcome:    (Retired) Wound Length (cm):    (Retired) Wound Width (cm):    (Retired) Depth (cm):    Wound Description (Comments):    Removal Indications:    Wound Image    08/29/22 1130   Dressing Appearance Open to air 08/30/22 1130   Drainage Amount Scant 08/30/22 1130   Drainage Characteristics/Odor Serosanguineous;No odor 08/30/22 1130    Appearance Red;Moist 08/30/22 1130   Tissue loss description Partial thickness 08/30/22 1130   Black (%), Wound Tissue Color 0 % 08/29/22 1130   Red (%), Wound Tissue Color 99 % 08/29/22 1130   Yellow (%), Wound Tissue Color 0 % 08/29/22 1130   Periwound Area Excoriated;Moist 08/30/22 1130   Wound Edges Undefined 08/30/22 1130   Care Cleansed with:;Soap and water;Wound cleanser;Applied:;Removed:;Skin Barrier 08/30/22 1130   Dressing Hydrocolloid 09/01/22 1207   Dressing Change Due 09/02/22 09/01/22 1207   Number of days: 4            Altered Skin Integrity 08/29/22 1130 Right posterior;medial Buttocks Full thickness tissue loss. Subcutaneous fat may be visible but bone, tendon or muscle are not exposed (Active)   08/29/22 1130   Altered Skin Integrity Present on Admission: yes   Side: Right   Orientation: posterior;medial   Location: Buttocks   Wound Number:    Is this injury device related?: No   Primary Wound Type:    Description of Altered Skin Integrity: Full thickness tissue loss. Subcutaneous fat may be visible but bone, tendon or muscle are not exposed   Ankle-Brachial Index:    Pulses:    Removal Indication and Assessment:    Wound Outcome:    (Retired) Wound Length (cm):    (Retired) Wound Width (cm):    (Retired) Depth (cm):    Wound Description (Comments):    Removal Indications:    Wound Image   08/29/22 1130   Description of Altered Skin Integrity Full thickness tissue loss. Subcutaneous fat may be visible but bone, tendon or muscle are not exposed 08/30/22 1130   Dressing Appearance Dry;Intact 08/30/22 2000   Drainage Amount None 08/30/22 1130   Appearance Pink;Dry 08/30/22 1130   Tissue loss description Full thickness 08/30/22 1130   Black (%), Wound Tissue Color 0 % 08/29/22 1130   Red (%), Wound Tissue Color 95 % 08/29/22 1130   Yellow (%), Wound Tissue Color 5 % 08/29/22 1130   Periwound Area Dry 08/30/22 1130   Wound Edges Defined 08/30/22 1130   Wound Length (cm) 1.5 cm 08/29/22 1130   Wound  Width (cm) 1 cm 08/29/22 1130   Wound Depth (cm) 0.1 cm 08/29/22 1130   Wound Volume (cm^3) 0.15 cm^3 08/29/22 1130   Wound Surface Area (cm^2) 1.5 cm^2 08/29/22 1130   Care Cleansed with:;Soap and water 08/31/22 1020   Dressing Hydrocolloid 09/01/22 1207   Dressing Change Due 09/02/22 09/01/22 1207   Number of days: 4            Altered Skin Integrity 08/29/22 1130 Left posterior;proximal Buttocks Partial thickness tissue loss. Shallow open ulcer with a red or pink wound bed, without slough. Intact or Open/Ruptured Serum-filled blister. (Active)   08/29/22 1130   Altered Skin Integrity Present on Admission: yes   Side: Left   Orientation: posterior;proximal   Location: Buttocks   Wound Number:    Is this injury device related?: No   Primary Wound Type:    Description of Altered Skin Integrity: Partial thickness tissue loss. Shallow open ulcer with a red or pink wound bed, without slough. Intact or Open/Ruptured Serum-filled blister.   Ankle-Brachial Index:    Pulses:    Removal Indication and Assessment:    Wound Outcome:    (Retired) Wound Length (cm):    (Retired) Wound Width (cm):    (Retired) Depth (cm):    Wound Description (Comments):    Removal Indications:    Wound Image    08/29/22 1130   Description of Altered Skin Integrity Partial thickness tissue loss. Shallow open ulcer with a red or pink wound bed, without slough. Intact or Open/Ruptured Serum-filled blister. 08/29/22 1130   Dressing Appearance Dry;Intact 08/30/22 2000   Drainage Amount None 08/30/22 1130   Appearance Pink;Dry 08/30/22 1130   Tissue loss description Partial thickness 08/30/22 1130   Black (%), Wound Tissue Color 0 % 08/29/22 1130   Red (%), Wound Tissue Color 99 % 08/29/22 1130   Yellow (%), Wound Tissue Color 0 % 08/29/22 1130   Periwound Area Dry 08/30/22 1130   Wound Edges Defined 08/30/22 1130   Wound Length (cm) 1.5 cm 08/29/22 1130   Wound Width (cm) 1 cm 08/29/22 1130   Wound Depth (cm) 0 cm 08/29/22 1130   Wound Volume (cm^3)  0 cm^3 08/29/22 1130   Wound Surface Area (cm^2) 1.5 cm^2 08/29/22 1130   Care Cleansed with:;Soap and water 08/31/22 1020   Dressing Applied;Collagen 08/31/22 1020   Number of days: 4            Altered Skin Integrity 08/29/22 1130 Left anterior other (see comments) Ulceration (Active)   08/29/22 1130   Altered Skin Integrity Present on Admission: yes   Side: Left   Orientation: anterior   Location: other (see comments)   Wound Number:    Is this injury device related?: No   Primary Wound Type: Ulceration   Description of Altered Skin Integrity:    Ankle-Brachial Index:    Pulses:    Removal Indication and Assessment:    Wound Outcome:    (Retired) Wound Length (cm):    (Retired) Wound Width (cm):    (Retired) Depth (cm):    Wound Description (Comments):    Removal Indications:    Wound Image    08/29/22 1130   Dressing Appearance Moist drainage 09/01/22 1207   Drainage Amount Small 09/01/22 1207   Drainage Characteristics/Odor Serosanguineous;No odor 09/01/22 1207   Appearance Red;Moist;Granulating 09/01/22 1207   Tissue loss description Full thickness 09/01/22 1207   Black (%), Wound Tissue Color 0 % 08/29/22 1130   Red (%), Wound Tissue Color 99 % 08/29/22 1130   Yellow (%), Wound Tissue Color 0 % 08/29/22 1130   Periwound Area Excoriated;Moist 09/01/22 1207   Wound Edges Undefined;Open 09/01/22 1207   Wound Length (cm) 3 cm 08/29/22 1130   Wound Width (cm) 15 cm 08/29/22 1130   Wound Depth (cm) 1 cm 08/29/22 1130   Wound Volume (cm^3) 45 cm^3 08/29/22 1130   Wound Surface Area (cm^2) 45 cm^2 08/29/22 1130   Care Cleansed with:;Soap and water;Wound cleanser;Applied:;Removed:;Skin Barrier;Moisturizing agent 09/01/22 1207   Dressing Removed;Applied;Changed;Gauze, wet to moist;Hydrofiber;Gauze 09/01/22 1207   Periwound Care Moisture barrier applied;Skin barrier film applied 09/01/22 1207   Dressing Change Due 09/02/22 09/01/22 1207   Number of days: 4            Altered Skin Integrity 08/29/22 1130 Left  posterior;distal Thigh Abrasion(s) (Active)   08/29/22 1130   Altered Skin Integrity Present on Admission: yes   Side: Left   Orientation: posterior;distal   Location: Thigh   Wound Number:    Is this injury device related?: No   Primary Wound Type: Abrasion(s)   Description of Altered Skin Integrity:    Ankle-Brachial Index:    Pulses:    Removal Indication and Assessment:    Wound Outcome:    (Retired) Wound Length (cm):    (Retired) Wound Width (cm):    (Retired) Depth (cm):    Wound Description (Comments):    Removal Indications:    Wound Image    08/29/22 1130   Description of Altered Skin Integrity Partial thickness tissue loss. Shallow open ulcer with a red or pink wound bed, without slough. Intact or Open/Ruptured Serum-filled blister. 08/29/22 1130   Dressing Appearance Open to air 08/30/22 1130   Drainage Amount None 08/30/22 1130   Drainage Characteristics/Odor Serosanguineous 08/29/22 1130   Appearance Red 08/30/22 1130   Tissue loss description Full thickness 08/29/22 1130   Black (%), Wound Tissue Color 0 % 08/29/22 1130   Red (%), Wound Tissue Color 100 % 08/29/22 1130   Yellow (%), Wound Tissue Color 0 % 08/29/22 1130   Periwound Area Dry 08/30/22 1130   Care Cleansed with:;Soap and water 08/30/22 1130   Number of days: 4            Altered Skin Integrity 08/29/22 1130 Right medial Buttocks Full thickness tissue loss. Subcutaneous fat may be visible but bone, tendon or muscle are not exposed (Active)   08/29/22 1130   Altered Skin Integrity Present on Admission: yes   Side: Right   Orientation: medial   Location: Buttocks   Wound Number:    Is this injury device related?:    Primary Wound Type:    Description of Altered Skin Integrity: Full thickness tissue loss. Subcutaneous fat may be visible but bone, tendon or muscle are not exposed   Ankle-Brachial Index:    Pulses:    Removal Indication and Assessment:    Wound Outcome:    (Retired) Wound Length (cm):    (Retired) Wound Width (cm):    (Retired)  Depth (cm):    Wound Description (Comments):    Removal Indications:    Wound Image    08/29/22 1130   Description of Altered Skin Integrity Full thickness tissue loss. Subcutaneous fat may be visible but bone, tendon or muscle are not exposed 09/01/22 1207   Dressing Appearance Moist drainage 09/01/22 1207   Drainage Amount Scant 09/01/22 1207   Drainage Characteristics/Odor Serosanguineous;No odor 09/01/22 1207   Appearance Red;Moist 09/01/22 1207   Tissue loss description Full thickness 09/01/22 1207   Black (%), Wound Tissue Color 0 % 08/29/22 1130   Red (%), Wound Tissue Color 50 % 08/29/22 1130   Yellow (%), Wound Tissue Color 50 % 08/29/22 1130   Periwound Area Dry 09/01/22 1207   Wound Edges Defined;Open 09/01/22 1207   Wound Length (cm) 2 cm 08/29/22 1130   Wound Width (cm) 1 cm 08/29/22 1130   Wound Depth (cm) 0.5 cm 08/29/22 1130   Wound Volume (cm^3) 1 cm^3 08/29/22 1130   Wound Surface Area (cm^2) 2 cm^2 08/29/22 1130   Care Cleansed with:;Soap and water;Wound cleanser;Applied:;Removed:;Skin Barrier;Moisturizing agent 09/01/22 1207   Dressing Removed;Applied;Changed;Gauze, wet to moist;Collagen;Gauze 09/01/22 1207   Periwound Care Moisture barrier applied;Skin barrier film applied 09/01/22 1207   Dressing Change Due 09/02/22 09/01/22 1207   Number of days: 4            Altered Skin Integrity 08/31/22 1020 Right Clavicle Full thickness tissue loss. Base is covered by slough and/or eschar in the wound bed (Active)   08/31/22 1020   Altered Skin Integrity Present on Admission:    Side: Right   Orientation:    Location: Clavicle   Wound Number:    Is this injury device related?:    Primary Wound Type:    Description of Altered Skin Integrity: Full thickness tissue loss. Base is covered by slough and/or eschar in the wound bed   Ankle-Brachial Index:    Pulses:    Removal Indication and Assessment:    Wound Outcome:    (Retired) Wound Length (cm):    (Retired) Wound Width (cm):    (Retired) Depth (cm):     Wound Description (Comments):    Removal Indications:    Description of Altered Skin Integrity Full thickness tissue loss. Base is covered by slough and/or eschar in the wound bed 08/31/22 1615   Dressing Appearance Clean 08/31/22 1615   Drainage Amount Scant 08/31/22 1615   Appearance [place];Pink 08/31/22 1615   Care Cleansed with:;Soap and water 08/31/22 1615   Dressing Applied;Other (comment) 08/31/22 1615   Number of days: 2            Altered Skin Integrity 09/01/22 1207 Left posterior Hip Ulceration (Active)   09/01/22 1207   Altered Skin Integrity Present on Admission:    Side: Left   Orientation: posterior   Location: Hip   Wound Number:    Is this injury device related?: No   Primary Wound Type: Ulceration   Description of Altered Skin Integrity:    Ankle-Brachial Index:    Pulses:    Removal Indication and Assessment:    Wound Outcome:    (Retired) Wound Length (cm):    (Retired) Wound Width (cm):    (Retired) Depth (cm):    Wound Description (Comments):    Removal Indications:    Wound Image    09/01/22 1207   Dressing Appearance Dry 09/01/22 1207   Drainage Amount None 09/01/22 1207   Appearance Purple;Dry 09/01/22 1207   Tissue loss description Not applicable 09/01/22 1207   Periwound Area Dry;Indurated 09/01/22 1207   Wound Edges Undefined 09/01/22 1207   Care Cleansed with:;Soap and water 09/01/22 1207   Number of days: 1         Assessment and Plan      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  Clean with vashe  Apply vashe moistened drawtex to wound bed  Cover with secure wit 4x4s, abd pad, and paper tape  Change daily and PRN for drainage  Cultures positive for Escherichia coli Proteus mirabilis Staphylococcus aureus Enterococcus faecalis   IV antibiotics     Pressure injury of buttock, unstageable  Clean wound with vashe  Apply sensicare around wound  Apply santyl (esther thickness) to wound bed, cover with vashe moistened 4x4  Cover and secure  with abd pad and paper tape  Change daily and PRN for soilage  Turn every two hours  Low air loss mattress  Keep pressure off wound  TAP system      Diabetic ulcer of right heel associated with type 2 diabetes mellitus  Clean wound with vashe  Apply sensicare around wound  Apply santyl (esther thickness) to wound bed, cover with vashe moistened 4x4  Cover and secure with 4x4s, oralia, and paper tape  Change daily and PRN for soilage  X-ray today  Bedside debridement today  Cultures positive for Proteus mirabilis, Escherichia coli, and Enterococcus faecalis                 Signature:  KARELY Fonseca  Wound Care    Date of encounter: 09/01/2022

## 2022-09-01 NOTE — PROCEDURES
"Michelle Nunes is a 66 y.o. female patient.    Temp: 99.2 °F (37.3 °C) (09/01/22 0800)  Pulse: 105 (09/01/22 1000)  Resp: 12 (09/01/22 1000)  BP: 109/85 (09/01/22 1000)  SpO2: 95 % (09/01/22 1000)  Weight: 132.2 kg (291 lb 7.2 oz) (08/31/22 0600)  Height: 5' 3" (160 cm) (08/27/22 1415)       Debridement    Date/Time: 9/1/2022 11:49 AM  Performed by: KARELY Fonseca  Authorized by: KARELY Fonseca     Consent Done?:  Yes (Written)    Wound Details:    Location:  Right foot    Location:  Right Heel    Type of Debridement:  Excisional       Length (cm):  5       Area (sq cm):  25       Width (cm):  5       Percent Debrided (%):  100       Depth (cm):  1.5       Total Area Debrided (sq cm):  25    Depth of debridement:  Subcutaneous tissue    Tissue debrided:  Adipose, Dermis, Epidermis and Subcutaneous    Devitalized tissue debrided:  Biofilm, Clots, Exudate, Fibrin, Necrotic/Eschar and Slough    Instruments:  Scissors    Bleeding:  None  Patient tolerance:  Patient tolerated the procedure well with no immediate complications     Assistant Akiko Diehl LPN    9/1/2022    "

## 2022-09-02 LAB
GLUCOSE SERPL-MCNC: 129 MG/DL (ref 70–105)
GLUCOSE SERPL-MCNC: 131 MG/DL (ref 70–105)
GLUCOSE SERPL-MCNC: 143 MG/DL (ref 70–105)
HCO3 UR-SCNC: 27.1 MMOL/L (ref 21–28)
PCO2 BLDA: 48 MMHG (ref 35–48)
PH SMN: 7.36 [PH] (ref 7.35–7.45)
PO2 BLDA: 78 MMHG (ref 83–108)
POC BASE EXCESS: 1.1 MMOL/L (ref -2–3)
POC SATURATED O2: 95 %

## 2022-09-02 PROCEDURE — 63600175 PHARM REV CODE 636 W HCPCS: Performed by: FAMILY MEDICINE

## 2022-09-02 PROCEDURE — 25000003 PHARM REV CODE 250: Performed by: FAMILY MEDICINE

## 2022-09-02 PROCEDURE — 27000940

## 2022-09-02 PROCEDURE — 25000003 PHARM REV CODE 250: Performed by: INTERNAL MEDICINE

## 2022-09-02 PROCEDURE — 25000003 PHARM REV CODE 250: Performed by: NURSE PRACTITIONER

## 2022-09-02 PROCEDURE — 99900035 HC TECH TIME PER 15 MIN (STAT)

## 2022-09-02 PROCEDURE — 97110 THERAPEUTIC EXERCISES: CPT | Mod: CO

## 2022-09-02 PROCEDURE — 27000221 HC OXYGEN, UP TO 24 HOURS

## 2022-09-02 PROCEDURE — 80100014 HC HEMODIALYSIS 1:1

## 2022-09-02 PROCEDURE — 82803 BLOOD GASES ANY COMBINATION: CPT

## 2022-09-02 PROCEDURE — 27000952

## 2022-09-02 PROCEDURE — 99233 SBSQ HOSP IP/OBS HIGH 50: CPT | Mod: ,,, | Performed by: NURSE PRACTITIONER

## 2022-09-02 PROCEDURE — 36600 WITHDRAWAL OF ARTERIAL BLOOD: CPT

## 2022-09-02 PROCEDURE — 94660 CPAP INITIATION&MGMT: CPT

## 2022-09-02 PROCEDURE — 82962 GLUCOSE BLOOD TEST: CPT

## 2022-09-02 PROCEDURE — 27000934

## 2022-09-02 PROCEDURE — 99233 PR SUBSEQUENT HOSPITAL CARE,LEVL III: ICD-10-PCS | Mod: ,,, | Performed by: NURSE PRACTITIONER

## 2022-09-02 PROCEDURE — 11000008

## 2022-09-02 PROCEDURE — 94761 N-INVAS EAR/PLS OXIMETRY MLT: CPT

## 2022-09-02 RX ORDER — BUPROPION HYDROCHLORIDE 75 MG/1
75 TABLET ORAL 2 TIMES DAILY
COMMUNITY
Start: 2022-08-03

## 2022-09-02 RX ORDER — POLYETHYLENE GLYCOL 3350 17 G/17G
17 POWDER, FOR SOLUTION ORAL DAILY
COMMUNITY
Start: 2022-08-02

## 2022-09-02 RX ADMIN — ESCITALOPRAM OXALATE 10 MG: 10 TABLET ORAL at 08:09

## 2022-09-02 RX ADMIN — HYDROCODONE BITARTRATE AND ACETAMINOPHEN 1 TABLET: 10; 325 TABLET ORAL at 06:09

## 2022-09-02 RX ADMIN — APIXABAN 2.5 MG: 2.5 TABLET, FILM COATED ORAL at 09:09

## 2022-09-02 RX ADMIN — GABAPENTIN 200 MG: 100 CAPSULE ORAL at 08:09

## 2022-09-02 RX ADMIN — HYDROCORTISONE ACETATE 25 MG: 25 SUPPOSITORY RECTAL at 09:09

## 2022-09-02 RX ADMIN — MIDODRINE HYDROCHLORIDE 10 MG: 5 TABLET ORAL at 09:09

## 2022-09-02 RX ADMIN — HEPARIN SODIUM 4000 UNITS: 1000 INJECTION INTRAVENOUS; SUBCUTANEOUS at 11:09

## 2022-09-02 RX ADMIN — ASPIRIN 81 MG: 81 TABLET, COATED ORAL at 09:09

## 2022-09-02 RX ADMIN — PANTOPRAZOLE SODIUM 40 MG: 40 TABLET, DELAYED RELEASE ORAL at 09:09

## 2022-09-02 RX ADMIN — Medication 3 MG: at 08:09

## 2022-09-02 RX ADMIN — NOREPINEPHRINE BITARTRATE 0.06 MCG/KG/MIN: 1 SOLUTION INTRAVENOUS at 04:09

## 2022-09-02 RX ADMIN — ATORVASTATIN CALCIUM 40 MG: 40 TABLET, FILM COATED ORAL at 08:09

## 2022-09-02 RX ADMIN — MEGESTROL ACETATE 40 MG: 40 TABLET ORAL at 07:09

## 2022-09-02 RX ADMIN — FOLIC ACID 1000 MCG: 1 TABLET ORAL at 07:09

## 2022-09-02 RX ADMIN — ALLOPURINOL 100 MG: 100 TABLET ORAL at 09:09

## 2022-09-02 RX ADMIN — APIXABAN 2.5 MG: 2.5 TABLET, FILM COATED ORAL at 08:09

## 2022-09-02 RX ADMIN — INSULIN DETEMIR 10 UNITS: 100 INJECTION, SOLUTION SUBCUTANEOUS at 08:09

## 2022-09-02 RX ADMIN — HYDROCORTISONE ACETATE 25 MG: 25 SUPPOSITORY RECTAL at 08:09

## 2022-09-02 RX ADMIN — LEVOTHYROXINE SODIUM 100 MCG: 100 TABLET ORAL at 07:09

## 2022-09-02 RX ADMIN — Medication 5000 UNITS: at 09:09

## 2022-09-02 RX ADMIN — HYDROCODONE BITARTRATE AND ACETAMINOPHEN 1 TABLET: 10; 325 TABLET ORAL at 03:09

## 2022-09-02 RX ADMIN — MIDODRINE HYDROCHLORIDE 10 MG: 5 TABLET ORAL at 03:09

## 2022-09-02 RX ADMIN — MIDODRINE HYDROCHLORIDE 10 MG: 5 TABLET ORAL at 08:09

## 2022-09-02 RX ADMIN — TRAZODONE HYDROCHLORIDE 50 MG: 50 TABLET ORAL at 10:09

## 2022-09-02 RX ADMIN — SODIUM CHLORIDE: 9 INJECTION, SOLUTION INTRAVENOUS at 11:09

## 2022-09-02 NOTE — PLAN OF CARE
Per MD notes pt continues on BiPap and oxygen nc. Continue with wound care and abx tx. Continue monitoring labs with tx prn. Will follow dc needs as arise.

## 2022-09-02 NOTE — SUBJECTIVE & OBJECTIVE
Interval History: No acute events overnight. Resting quietly- does have some slight confusion. She is afebrile. Levophed continues- BP readings are labile.     Objective:     Vital Signs (Most Recent):  Temp: 98.5 °F (36.9 °C) (09/02/22 1335)  Pulse: 93 (09/02/22 1335)  Resp: 10 (09/02/22 1335)  BP: (!) 155/61 (09/02/22 1335)  SpO2: 100 % (09/02/22 1335)   Vital Signs (24h Range):  Temp:  [97.7 °F (36.5 °C)-98.6 °F (37 °C)] 98.5 °F (36.9 °C)  Pulse:  [] 93  Resp:  [9-25] 10  SpO2:  [96 %-100 %] 100 %  BP: ()/() 155/61     Weight: 132.2 kg (291 lb 7.2 oz)  Body mass index is 51.63 kg/m².      Intake/Output Summary (Last 24 hours) at 9/2/2022 1427  Last data filed at 9/2/2022 1335  Gross per 24 hour   Intake 500 ml   Output 1500 ml   Net -1000 ml       Physical Exam  Vitals and nursing note reviewed.   Constitutional:       General: She is not in acute distress.     Appearance: She is obese. She is not ill-appearing.   HENT:      Head: Normocephalic and atraumatic.      Right Ear: External ear normal.      Left Ear: External ear normal.      Mouth/Throat:      Mouth: Mucous membranes are moist.      Pharynx: Oropharynx is clear.   Eyes:      Extraocular Movements: Extraocular movements intact.      Conjunctiva/sclera: Conjunctivae normal.      Pupils: Pupils are equal, round, and reactive to light.   Cardiovascular:      Rate and Rhythm: Regular rhythm. Tachycardia present.      Heart sounds: Normal heart sounds.   Pulmonary:      Effort: No respiratory distress.      Breath sounds: Normal breath sounds. No wheezing or rales.   Abdominal:      General: Bowel sounds are normal.      Palpations: Abdomen is soft.   Musculoskeletal:         General: Normal range of motion.      Cervical back: Normal range of motion.      Comments: Left amputation    Skin:     General: Skin is warm.      Coloration: Skin is not pale.      Comments: Sacral, right heel, amputation site with wounds   Neurological:       General: No focal deficit present.      Mental Status: She is alert and oriented to person, place, and time. Mental status is at baseline.   Psychiatric:         Behavior: Behavior normal.       Vents:  Oxygen Concentration (%): 30 (09/02/22 0815)    Lines/Drains/Airways       Central Venous Catheter Line  Duration                  Hemodialysis Catheter left subclavian -- days              Peripheral Intravenous Line  Duration                  Peripheral IV - Single Lumen 08/28/22 1000 22 G Posterior;Right Wrist 5 days                    Significant Labs:    CBC/Anemia Profile:  No results for input(s): WBC, HGB, HCT, PLT, MCV, RDW, IRON, FERRITIN, RETIC, FOLATE, [id number], OCCULTBLOOD in the last 48 hours.     Chemistries:  No results for input(s): NA, K, CL, CO2, BUN, CREATININE, CALCIUM, ALBUMIN, PROT, BILITOT, ALKPHOS, ALT, AST, GLUCOSE, MG, PHOS in the last 48 hours.    All pertinent labs within the past 24 hours have been reviewed.    Significant Imaging:  I have reviewed all pertinent imaging results/findings within the past 24 hours.

## 2022-09-02 NOTE — DIALYSIS ROUNDING
"          Nephrology Department            NAME: Michelle Nunes   YOB: 1955  MRN: [id number]  NOTE DATE: 09/02/2022       Seen in Dialysis Unit in the ICU.  She is very anxious and wants to go home.  Blood pressure is low.  She is requesting that I call her daughter Barbi.   She remains on pressor medications.  Patient complains:  None.      REVIEW OF SYSTEMS:  she reports no shortness of breath, nausea or vomiting. No acute changes.    VITALS:  height is 5' 3" (1.6 m) and weight is 132.2 kg (291 lb 7.2 oz). Her temperature is 97.7 °F (36.5 °C). Her blood pressure is 106/68 and her pulse is 103. Her respiration is 14 and oxygen saturation is 100%.  Hemodialysis documentation flowsheet reviewed with dialysis nurse, including weight and vitals.    Resting comfortably, NAD.  Respiration unlabored. Lungs clear.  Heart regular, no rub.  Abdomen soft, nontender, positive bowl sounds  2 plus edema.    Recent Labs   Lab 08/27/22  0538 08/29/22  0710 08/31/22  0248   * 132* 127*   K 4.1 4.2 5.5*   CL 98 98 99   CO2 26 27 19*   BUN 25* 25* 33*   CREATININE 6.65* 6.67* 7.64*   CALCIUM 9.0 9.3 9.2     Recent Labs   Lab 08/27/22  0538   WBC 15.24*   HGB 8.6*   HCT 27.7*          ASSESSMENT/PLAN:  Continue with scheduled hemodialysis for this patient.    Jef Romero Jr, MD   "

## 2022-09-02 NOTE — PT/OT/SLP PROGRESS
Occupational Therapy   Treatment    Name: Michelle Nunes  MRN: [id number]  Admitting Diagnosis:  Infection of amputation stump       Recommendations:     Discharge Recommendations: home with home health, nursing facility, skilled  Discharge Equipment Recommendations:  none  Barriers to discharge:       Assessment:     Michelle Nunes is a 66 y.o. female with a medical diagnosis of Infection of amputation stump.  . Performance deficits affecting function are weakness, impaired endurance, impaired self care skills, impaired cognition.     Rehab Prognosis:  Fair; patient would benefit from acute skilled OT services to address these deficits and reach maximum level of function.       Plan:     Patient to be seen 5 x/week to address the above listed problems via self-care/home management, therapeutic activities, therapeutic exercises  Plan of Care Expires: 09/05/22  Plan of Care Reviewed with: patient    Subjective     Pain/Comfort:  Location - Side 1: Left  Location 1: shoulder  Pain Addressed 1: Distraction    Objective:     Communicated with: JAY Gaines prior to session.  Patient found HOB elevated with pulse ox (continuous), telemetry, peripheral IV, blood pressure cuff upon OT entry to room.    General Precautions: Standard, fall, respiratory   Orthopedic Precautions:N/A   Braces: N/A  Respiratory Status: Nasal cannula, flow 3 L/min     Occupational Performance:     Bed Mobility:        Functional Mobility/Transfers:  Functional Mobility:     Activities of Daily Living:  Set up with v/c's  to wash her face      AMPAC 6 Click ADL:      Treatment & Education:  Pt performed hand helper with 1 rubber band resistances 20 reps , aarom with 1 lb wt 15 reps x 2 B elbow  flex/ext,abd/add,10reps B shld flex    Patient left HOB elevated with all lines intact and  student nurse present    GOALS:   Multidisciplinary Problems       Occupational Therapy Goals          Problem: Occupational Therapy    Goal Priority  Disciplines Outcome Interventions   Occupational Therapy Goal     OT, PT/OT Ongoing, Not Progressing    Description: STG:  Pt will perform grooming with setup  Pt will bathe with setup and min(A) for upper body anterior  Pt will perform UE dressing with setup  Pt will perform self- feeding with setup  Pt will perform HEP independently  Pt will tolerate 20 minutes of tx without fatigue      LT.Restore to max I with self care and mobility.                          Time Tracking:     OT Date of Treatment: 22  OT Start Time: 922  OT Stop Time: 947  OT Total Time (min): 25 min    Billable Minutes:Therapeutic Exercise 15    OT/TARYN: TARYN          2022

## 2022-09-02 NOTE — ASSESSMENT & PLAN NOTE
Clean wound with vashe  Apply sensicare around wound  Apply santyl (esther thickness) to wound bed, cover with vashe moistened 4x4  Cover and secure with 4x4s, oralia, and paper tape  Change daily and PRN for soilage  X-ray today  Bedside debridement today  Cultures positive for Proteus mirabilis, Escherichia coli, and Enterococcus faecalis

## 2022-09-02 NOTE — ASSESSMENT & PLAN NOTE
BP very labile -- seems to get worse after dialysis - started on Levophed and transferred to [facility] last night  - midodrine increased yesterday    8/29 BP looks ok this am  Now hypertensive at times  8/31- she remains on pressor. Weaning as able. MAP goal of 60  9/1- likely not getting accurate blood pressures as she has wide fluctuations in her pressures. She remains on some levophed  9/2- wean levophed as able; BP with large fluctuations in readings- questionable accuracy

## 2022-09-02 NOTE — PROGRESS NOTES
[facility]  Pulmonology  Progress Note    Patient Name: Michelle Nunes  MRN: [id number]  Admission Date: 8/27/2022  Hospital Length of Stay: 6 days  Code Status: Full Code  Attending Provider: Donis Shi DO  Primary Care Provider: Primary Doctor No   Principal Problem: Infection of amputation stump    Subjective:     Interval History: No acute events overnight. Resting quietly- does have some slight confusion. She is afebrile. Levophed continues- BP readings are labile.     Objective:     Vital Signs (Most Recent):  Temp: 98.5 °F (36.9 °C) (09/02/22 1335)  Pulse: 93 (09/02/22 1335)  Resp: 10 (09/02/22 1335)  BP: (!) 155/61 (09/02/22 1335)  SpO2: 100 % (09/02/22 1335)   Vital Signs (24h Range):  Temp:  [97.7 °F (36.5 °C)-98.6 °F (37 °C)] 98.5 °F (36.9 °C)  Pulse:  [] 93  Resp:  [9-25] 10  SpO2:  [96 %-100 %] 100 %  BP: ()/() 155/61     Weight: 132.2 kg (291 lb 7.2 oz)  Body mass index is 51.63 kg/m².      Intake/Output Summary (Last 24 hours) at 9/2/2022 1427  Last data filed at 9/2/2022 1335  Gross per 24 hour   Intake 500 ml   Output 1500 ml   Net -1000 ml       Physical Exam  Vitals and nursing note reviewed.   Constitutional:       General: She is not in acute distress.     Appearance: She is obese. She is not ill-appearing.   HENT:      Head: Normocephalic and atraumatic.      Right Ear: External ear normal.      Left Ear: External ear normal.      Mouth/Throat:      Mouth: Mucous membranes are moist.      Pharynx: Oropharynx is clear.   Eyes:      Extraocular Movements: Extraocular movements intact.      Conjunctiva/sclera: Conjunctivae normal.      Pupils: Pupils are equal, round, and reactive to light.   Cardiovascular:      Rate and Rhythm: Regular rhythm. Tachycardia present.      Heart sounds: Normal heart sounds.   Pulmonary:      Effort: No respiratory distress.      Breath sounds: Normal breath sounds. No wheezing or rales.   Abdominal:       General: Bowel sounds are normal.      Palpations: Abdomen is soft.   Musculoskeletal:         General: Normal range of motion.      Cervical back: Normal range of motion.      Comments: Left amputation    Skin:     General: Skin is warm.      Coloration: Skin is not pale.      Comments: Sacral, right heel, amputation site with wounds   Neurological:      General: No focal deficit present.      Mental Status: She is alert and oriented to person, place, and time. Mental status is at baseline.   Psychiatric:         Behavior: Behavior normal.       Vents:  Oxygen Concentration (%): 30 (09/02/22 0815)    Lines/Drains/Airways       Central Venous Catheter Line  Duration                  Hemodialysis Catheter left subclavian -- days              Peripheral Intravenous Line  Duration                  Peripheral IV - Single Lumen 08/28/22 1000 22 G Posterior;Right Wrist 5 days                    Significant Labs:    CBC/Anemia Profile:  No results for input(s): WBC, HGB, HCT, PLT, MCV, RDW, IRON, FERRITIN, RETIC, FOLATE, [id number], OCCULTBLOOD in the last 48 hours.     Chemistries:  No results for input(s): NA, K, CL, CO2, BUN, CREATININE, CALCIUM, ALBUMIN, PROT, BILITOT, ALKPHOS, ALT, AST, GLUCOSE, MG, PHOS in the last 48 hours.    All pertinent labs within the past 24 hours have been reviewed.    Significant Imaging:  I have reviewed all pertinent imaging results/findings within the past 24 hours.    Assessment/Plan:     * Infection of amputation stump  Wound care - cultures reviewed - sensitive to Azactam   9/2- has completed abx     Morbid obesity with BMI of 50.0-59.9, adult  Complicates all levels of care    Acute hypercapnic respiratory failure  Hx ROXANN, [facility] -- needs to use bipap every night   Pt placed on Bipap this morning --- ABGs returned - 7.27/62/150   - will change settings and repeat labs in 1 hour   8/29 Awake and alert this am. Placing on nasal cannula during the day and bipap at night  On nasal cannula  this am. Slept with the bipap overnight. Continue with current care  8/31- Did not wear bipap overnight and now she is confused. I am going to check an ABG. Place back on bipap. She had an episode of emesis last night and they held the bipap. We are not going to be able to hold the bipap overnight or she is going to have issues with CO2 retention  9/1- better this am. We are going to place back on nasal cannula this am. She will continue with bipap at night.  9/2- NC at present; continue bipap overnight     Hypotension  BP very labile -- seems to get worse after dialysis - started on Levophed and transferred to [facility] last night  - midodrine increased yesterday    8/29 BP looks ok this am  Now hypertensive at times  8/31- she remains on pressor. Weaning as able. MAP goal of 60  9/1- likely not getting accurate blood pressures as she has wide fluctuations in her pressures. She remains on some levophed  9/2- wean levophed as able; BP with large fluctuations in readings- questionable accuracy     Atrial fibrillation, controlled  Rate controlled- continue eliquis    Pressure injury of buttock, unstageable  Continue with wound care    Type 2 diabetes mellitus  Glucose <180  Continue with current care  She is on 10 units of levemir and mild sliding scale  9/2 glucose controlled with current treatment     ESRD on dialysis  Continue with dialysis per nephrology  T/TH/ Sat  9/2- MWF HD     Deep vein thrombosis (DVT) of lower extremity  Dopplers negative here   She is on eliquis 2.5 bid    Diabetic ulcer of right heel associated with type 2 diabetes mellitus  Continue with wound care          KARELY Asencio/GRANT  Pulmonology  [facility]

## 2022-09-02 NOTE — ASSESSMENT & PLAN NOTE
Hx ROXANN, [facility] -- needs to use bipap every night   Pt placed on Bipap this morning --- ABGs returned - 7.27/62/150   - will change settings and repeat labs in 1 hour   8/29 Awake and alert this am. Placing on nasal cannula during the day and bipap at night  On nasal cannula this am. Slept with the bipap overnight. Continue with current care  8/31- Did not wear bipap overnight and now she is confused. I am going to check an ABG. Place back on bipap. She had an episode of emesis last night and they held the bipap. We are not going to be able to hold the bipap overnight or she is going to have issues with CO2 retention  9/1- better this am. We are going to place back on nasal cannula this am. She will continue with bipap at night.  9/2- NC at present; continue bipap overnight

## 2022-09-02 NOTE — ASSESSMENT & PLAN NOTE
Glucose <180  Continue with current care  She is on 10 units of levemir and mild sliding scale  9/2 glucose controlled with current treatment

## 2022-09-02 NOTE — PROGRESS NOTES
Wound care note;  Patient has indurated purple discolored area to Left hip. Open to air.  Bedside debridement per Teresa Dye FNP, to Right  heel wound boggy , cont to evolve, painful .   Wound care cont to evaluate/assess

## 2022-09-02 NOTE — ASSESSMENT & PLAN NOTE
Clean with vashe  Apply vashe moistened drawtex to wound bed  Cover with secure wit 4x4s, abd pad, and paper tape  Change daily and PRN for drainage  Cultures positive for Escherichia coli Proteus mirabilis Staphylococcus aureus Enterococcus faecalis   IV antibiotics

## 2022-09-03 LAB
ALBUMIN SERPL BCP-MCNC: 1.7 G/DL (ref 3.5–5)
ALBUMIN/GLOB SERPL: 0.4 {RATIO}
ALP SERPL-CCNC: 88 U/L (ref 55–142)
ALT SERPL W P-5'-P-CCNC: 13 U/L (ref 13–56)
ANION GAP SERPL CALCULATED.3IONS-SCNC: 13 MMOL/L (ref 7–16)
AST SERPL W P-5'-P-CCNC: 24 U/L (ref 15–37)
BILIRUB SERPL-MCNC: 0.5 MG/DL (ref ?–1.2)
BUN SERPL-MCNC: 24 MG/DL (ref 7–18)
BUN/CREAT SERPL: 4 (ref 6–20)
CALCIUM SERPL-MCNC: 8.7 MG/DL (ref 8.5–10.1)
CHLORIDE SERPL-SCNC: 103 MMOL/L (ref 98–107)
CO2 SERPL-SCNC: 22 MMOL/L (ref 21–32)
CREAT SERPL-MCNC: 5.45 MG/DL (ref 0.55–1.02)
EGFR (NO RACE VARIABLE) (RUSH/TITUS): 8 ML/MIN/1.73M²
GLOBULIN SER-MCNC: 4.4 G/DL (ref 2–4)
GLUCOSE SERPL-MCNC: 110 MG/DL (ref 70–105)
GLUCOSE SERPL-MCNC: 123 MG/DL (ref 70–105)
GLUCOSE SERPL-MCNC: 89 MG/DL (ref 70–105)
GLUCOSE SERPL-MCNC: 89 MG/DL (ref 74–106)
MAGNESIUM SERPL-MCNC: 1.8 MG/DL (ref 1.7–2.3)
PHOSPHATE SERPL-MCNC: 3.2 MG/DL (ref 2.5–4.5)
POTASSIUM SERPL-SCNC: 4.8 MMOL/L (ref 3.5–5.1)
PROT SERPL-MCNC: 6.1 G/DL (ref 6.4–8.2)
SODIUM SERPL-SCNC: 133 MMOL/L (ref 136–145)

## 2022-09-03 PROCEDURE — 84100 ASSAY OF PHOSPHORUS: CPT | Performed by: HOSPITALIST

## 2022-09-03 PROCEDURE — 11000008

## 2022-09-03 PROCEDURE — 25000003 PHARM REV CODE 250: Performed by: INTERNAL MEDICINE

## 2022-09-03 PROCEDURE — 83735 ASSAY OF MAGNESIUM: CPT | Performed by: HOSPITALIST

## 2022-09-03 PROCEDURE — 27000221 HC OXYGEN, UP TO 24 HOURS

## 2022-09-03 PROCEDURE — 99233 SBSQ HOSP IP/OBS HIGH 50: CPT | Mod: ,,, | Performed by: NURSE PRACTITIONER

## 2022-09-03 PROCEDURE — 27000934

## 2022-09-03 PROCEDURE — 94761 N-INVAS EAR/PLS OXIMETRY MLT: CPT

## 2022-09-03 PROCEDURE — 99233 PR SUBSEQUENT HOSPITAL CARE,LEVL III: ICD-10-PCS | Mod: ,,, | Performed by: NURSE PRACTITIONER

## 2022-09-03 PROCEDURE — 80053 COMPREHEN METABOLIC PANEL: CPT | Performed by: HOSPITALIST

## 2022-09-03 PROCEDURE — 82962 GLUCOSE BLOOD TEST: CPT

## 2022-09-03 PROCEDURE — 94660 CPAP INITIATION&MGMT: CPT

## 2022-09-03 PROCEDURE — 25000003 PHARM REV CODE 250: Performed by: NURSE PRACTITIONER

## 2022-09-03 PROCEDURE — 25000003 PHARM REV CODE 250: Performed by: FAMILY MEDICINE

## 2022-09-03 PROCEDURE — 27000940

## 2022-09-03 PROCEDURE — 36415 COLL VENOUS BLD VENIPUNCTURE: CPT | Performed by: HOSPITALIST

## 2022-09-03 PROCEDURE — 27000952

## 2022-09-03 PROCEDURE — 63600175 PHARM REV CODE 636 W HCPCS: Performed by: NURSE PRACTITIONER

## 2022-09-03 PROCEDURE — 99900035 HC TECH TIME PER 15 MIN (STAT)

## 2022-09-03 PROCEDURE — 27000716 HC OXISENSOR PROBE, ANY SIZE

## 2022-09-03 RX ADMIN — TRAZODONE HYDROCHLORIDE 50 MG: 50 TABLET ORAL at 10:09

## 2022-09-03 RX ADMIN — Medication 5000 UNITS: at 08:09

## 2022-09-03 RX ADMIN — ALLOPURINOL 100 MG: 100 TABLET ORAL at 08:09

## 2022-09-03 RX ADMIN — HYDROCORTISONE ACETATE 25 MG: 25 SUPPOSITORY RECTAL at 09:09

## 2022-09-03 RX ADMIN — ACETAMINOPHEN 650 MG: 325 TABLET, FILM COATED ORAL at 10:09

## 2022-09-03 RX ADMIN — FOLIC ACID 1000 MCG: 1 TABLET ORAL at 06:09

## 2022-09-03 RX ADMIN — INSULIN DETEMIR 5 UNITS: 100 INJECTION, SOLUTION SUBCUTANEOUS at 09:09

## 2022-09-03 RX ADMIN — GABAPENTIN 200 MG: 100 CAPSULE ORAL at 09:09

## 2022-09-03 RX ADMIN — Medication 3 MG: at 09:09

## 2022-09-03 RX ADMIN — NOREPINEPHRINE BITARTRATE 0.04 MCG/KG/MIN: 1 SOLUTION INTRAVENOUS at 04:09

## 2022-09-03 RX ADMIN — ESCITALOPRAM OXALATE 10 MG: 10 TABLET ORAL at 09:09

## 2022-09-03 RX ADMIN — MEGESTROL ACETATE 40 MG: 40 TABLET ORAL at 06:09

## 2022-09-03 RX ADMIN — APIXABAN 2.5 MG: 2.5 TABLET, FILM COATED ORAL at 09:09

## 2022-09-03 RX ADMIN — HYDROCODONE BITARTRATE AND ACETAMINOPHEN 1 TABLET: 10; 325 TABLET ORAL at 01:09

## 2022-09-03 RX ADMIN — HYDROCORTISONE ACETATE 25 MG: 25 SUPPOSITORY RECTAL at 08:09

## 2022-09-03 RX ADMIN — ATORVASTATIN CALCIUM 40 MG: 40 TABLET, FILM COATED ORAL at 09:09

## 2022-09-03 RX ADMIN — MIDODRINE HYDROCHLORIDE 10 MG: 5 TABLET ORAL at 08:09

## 2022-09-03 RX ADMIN — MIDODRINE HYDROCHLORIDE 10 MG: 5 TABLET ORAL at 09:09

## 2022-09-03 RX ADMIN — LEVOTHYROXINE SODIUM 100 MCG: 100 TABLET ORAL at 06:09

## 2022-09-03 RX ADMIN — PANTOPRAZOLE SODIUM 40 MG: 40 TABLET, DELAYED RELEASE ORAL at 08:09

## 2022-09-03 RX ADMIN — ASPIRIN 81 MG: 81 TABLET, COATED ORAL at 08:09

## 2022-09-03 RX ADMIN — APIXABAN 2.5 MG: 2.5 TABLET, FILM COATED ORAL at 08:09

## 2022-09-03 RX ADMIN — MIDODRINE HYDROCHLORIDE 10 MG: 5 TABLET ORAL at 04:09

## 2022-09-03 RX ADMIN — HYDROCODONE BITARTRATE AND ACETAMINOPHEN 1 TABLET: 10; 325 TABLET ORAL at 10:09

## 2022-09-03 NOTE — ASSESSMENT & PLAN NOTE
BP very labile -- seems to get worse after dialysis - started on Levophed and transferred to \[facility]"" last night  - midodrine increased yesterday    8/29 BP looks ok this am  Now hypertensive at times  8/31- she remains on pressor. Weaning as able. MAP goal of 60  9/1- likely not getting accurate blood pressures as she has wide fluctuations in her pressures. She remains on some levophed  9/2- wean levophed as able; BP with large fluctuations in readings- questionable accuracy   9/3- will hold levo as BP is improved today with smaller fluctuations in readings; maintain MAP >60

## 2022-09-03 NOTE — SUBJECTIVE & OBJECTIVE
Interval History: Pt resting in bed. Levophed infusion has been paused with adequate BP's. No acute event overnight. No distress noted. Unable to obtain labs secondary to difficult stick per lab- will continue to attempt.     Objective:     Vital Signs (Most Recent):  Temp: 98.5 °F (36.9 °C) (09/03/22 1200)  Pulse: 94 (09/03/22 1200)  Resp: (!) 22 (09/03/22 1316)  BP: (!) 121/44 (09/03/22 1200)  SpO2: 96 % (09/03/22 1200)   Vital Signs (24h Range):  Temp:  [97.5 °F (36.4 °C)-99.6 °F (37.6 °C)] 98.5 °F (36.9 °C)  Pulse:  [] 94  Resp:  [8-22] 22  SpO2:  [92 %-100 %] 96 %  BP: ()/() 121/44     Weight: 126.4 kg (278 lb 10.6 oz)  Body mass index is 49.36 kg/m².    No intake or output data in the 24 hours ending 09/03/22 1457    Physical Exam  Vitals and nursing note reviewed.   Constitutional:       General: She is not in acute distress.     Appearance: She is obese. She is not ill-appearing.   HENT:      Head: Normocephalic and atraumatic.      Right Ear: External ear normal.      Left Ear: External ear normal.      Mouth/Throat:      Mouth: Mucous membranes are moist.      Pharynx: Oropharynx is clear.   Eyes:      Extraocular Movements: Extraocular movements intact.      Conjunctiva/sclera: Conjunctivae normal.      Pupils: Pupils are equal, round, and reactive to light.   Cardiovascular:      Rate and Rhythm: Normal rate and regular rhythm.      Heart sounds: Normal heart sounds.   Pulmonary:      Effort: No respiratory distress.      Breath sounds: Normal breath sounds. No wheezing or rales.   Abdominal:      General: Bowel sounds are normal.      Palpations: Abdomen is soft.   Musculoskeletal:         General: Normal range of motion.      Cervical back: Normal range of motion.      Comments: Left amputation    Skin:     General: Skin is warm.      Coloration: Skin is not pale.      Comments: Sacral, right heel, amputation site with wounds   Neurological:      General: No focal deficit present.       Mental Status: She is alert and oriented to person, place, and time. Mental status is at baseline.   Psychiatric:         Behavior: Behavior normal.       Vents:  Oxygen Concentration (%): 32 (09/03/22 0830)    Lines/Drains/Airways       Central Venous Catheter Line  Duration                  Hemodialysis Catheter left subclavian -- days              Peripheral Intravenous Line  Duration                  Peripheral IV - Single Lumen 08/28/22 1000 22 G Posterior;Right Wrist 6 days                    Significant Labs:    CBC/Anemia Profile:  No results for input(s): WBC, HGB, HCT, PLT, MCV, RDW, IRON, FERRITIN, RETIC, FOLATE, [id number], OCCULTBLOOD in the last 48 hours.     Chemistries:  No results for input(s): NA, K, CL, CO2, BUN, CREATININE, CALCIUM, ALBUMIN, PROT, BILITOT, ALKPHOS, ALT, AST, GLUCOSE, MG, PHOS in the last 48 hours.    All pertinent labs within the past 24 hours have been reviewed.  Unable to obtain labs at present-- pt is a hard stick.     Significant Imaging:  I have reviewed all pertinent imaging results/findings within the past 24 hours.

## 2022-09-03 NOTE — NURSING
Pt resting in bed at this time, right wrist IV site is intact and hand very swollen, flushed without any difficulty without blood return (levophed stopped at this time due to inadequate IV site, KARELY Asencio at the bedside and assessed the patient informed that IV levophed was stopped at this time.  Informed that patient is a very hard stick and need some type of line. States she will work on getting someone to come stick the patient.  The patient is stable at this time with a MAP between 60-65. NAD. Will con't to monitor.

## 2022-09-03 NOTE — PROGRESS NOTES
[facility]  Pulmonology  Progress Note    Patient Name: Michelle Nunes  MRN: [id number]  Admission Date: 8/27/2022  Hospital Length of Stay: 7 days  Code Status: Full Code  Attending Provider: Donis Shi DO  Primary Care Provider: Primary Doctor No   Principal Problem: Infection of amputation stump    Subjective:     Interval History: Pt resting in bed. Levophed infusion has been paused with adequate BP's. No acute event overnight. No distress noted. Unable to obtain labs secondary to difficult stick per lab- will continue to attempt.     Objective:     Vital Signs (Most Recent):  Temp: 98.5 °F (36.9 °C) (09/03/22 1200)  Pulse: 94 (09/03/22 1200)  Resp: (!) 22 (09/03/22 1316)  BP: (!) 121/44 (09/03/22 1200)  SpO2: 96 % (09/03/22 1200)   Vital Signs (24h Range):  Temp:  [97.5 °F (36.4 °C)-99.6 °F (37.6 °C)] 98.5 °F (36.9 °C)  Pulse:  [] 94  Resp:  [8-22] 22  SpO2:  [92 %-100 %] 96 %  BP: ()/() 121/44     Weight: 126.4 kg (278 lb 10.6 oz)  Body mass index is 49.36 kg/m².    No intake or output data in the 24 hours ending 09/03/22 1457    Physical Exam  Vitals and nursing note reviewed.   Constitutional:       General: She is not in acute distress.     Appearance: She is obese. She is not ill-appearing.   HENT:      Head: Normocephalic and atraumatic.      Right Ear: External ear normal.      Left Ear: External ear normal.      Mouth/Throat:      Mouth: Mucous membranes are moist.      Pharynx: Oropharynx is clear.   Eyes:      Extraocular Movements: Extraocular movements intact.      Conjunctiva/sclera: Conjunctivae normal.      Pupils: Pupils are equal, round, and reactive to light.   Cardiovascular:      Rate and Rhythm: Normal rate and regular rhythm.      Heart sounds: Normal heart sounds.   Pulmonary:      Effort: No respiratory distress.      Breath sounds: Normal breath sounds. No wheezing or rales.   Abdominal:      General: Bowel sounds are normal.       Palpations: Abdomen is soft.   Musculoskeletal:         General: Normal range of motion.      Cervical back: Normal range of motion.      Comments: Left amputation    Skin:     General: Skin is warm.      Coloration: Skin is not pale.      Comments: Sacral, right heel, amputation site with wounds   Neurological:      General: No focal deficit present.      Mental Status: She is alert and oriented to person, place, and time. Mental status is at baseline.   Psychiatric:         Behavior: Behavior normal.       Vents:  Oxygen Concentration (%): 32 (09/03/22 0830)    Lines/Drains/Airways       Central Venous Catheter Line  Duration                  Hemodialysis Catheter left subclavian -- days              Peripheral Intravenous Line  Duration                  Peripheral IV - Single Lumen 08/28/22 1000 22 G Posterior;Right Wrist 6 days                    Significant Labs:    CBC/Anemia Profile:  No results for input(s): WBC, HGB, HCT, PLT, MCV, RDW, IRON, FERRITIN, RETIC, FOLATE, [id number], OCCULTBLOOD in the last 48 hours.     Chemistries:  No results for input(s): NA, K, CL, CO2, BUN, CREATININE, CALCIUM, ALBUMIN, PROT, BILITOT, ALKPHOS, ALT, AST, GLUCOSE, MG, PHOS in the last 48 hours.    All pertinent labs within the past 24 hours have been reviewed.  Unable to obtain labs at present-- pt is a hard stick.     Significant Imaging:  I have reviewed all pertinent imaging results/findings within the past 24 hours.    Assessment/Plan:     * Infection of amputation stump  Wound care - cultures reviewed - sensitive to Azactam   9/2- has completed abx     Morbid obesity with BMI of 50.0-59.9, adult  Complicates all levels of care    Acute hypercapnic respiratory failure  Hx ROXANN, OHS -- needs to use bipap every night   Pt placed on Bipap this morning --- ABGs returned - 7.27/62/150   - will change settings and repeat labs in 1 hour   8/29 Awake and alert this am. Placing on nasal cannula during the day and bipap at  night  On nasal cannula this am. Slept with the bipap overnight. Continue with current care  8/31- Did not wear bipap overnight and now she is confused. I am going to check an ABG. Place back on bipap. She had an episode of emesis last night and they held the bipap. We are not going to be able to hold the bipap overnight or she is going to have issues with CO2 retention  9/1- better this am. We are going to place back on nasal cannula this am. She will continue with bipap at night.  9/2- NC at present; continue bipap overnight     Hypotension  BP very labile -- seems to get worse after dialysis - started on Levophed and transferred to [facility] last night  - midodrine increased yesterday    8/29 BP looks ok this am  Now hypertensive at times  8/31- she remains on pressor. Weaning as able. MAP goal of 60  9/1- likely not getting accurate blood pressures as she has wide fluctuations in her pressures. She remains on some levophed  9/2- wean levophed as able; BP with large fluctuations in readings- questionable accuracy   9/3- will hold levo as BP is improved today with smaller fluctuations in readings; maintain MAP >60     Atrial fibrillation, controlled  Rate controlled- continue eliquis    Pressure injury of buttock, unstageable  Continue with wound care    Type 2 diabetes mellitus  Glucose <180  Continue with current care  She is on 10 units of levemir and mild sliding scale  9/2 glucose controlled with current treatment     ESRD on dialysis  Continue with dialysis per nephrology  T/TH/ Sat  9/2- MWF HD     Deep vein thrombosis (DVT) of lower extremity  Dopplers negative here   She is on eliquis 2.5 bid    Diabetic ulcer of right heel associated with type 2 diabetes mellitus  Continue with wound care          KARELY Asencio/GRANT  Pulmonology  [facility]

## 2022-09-04 LAB
GLUCOSE SERPL-MCNC: 138 MG/DL (ref 70–105)
GLUCOSE SERPL-MCNC: 181 MG/DL (ref 70–105)
GLUCOSE SERPL-MCNC: 203 MG/DL (ref 70–105)
GLUCOSE SERPL-MCNC: 94 MG/DL (ref 70–105)

## 2022-09-04 PROCEDURE — 27000940

## 2022-09-04 PROCEDURE — 25000003 PHARM REV CODE 250: Performed by: FAMILY MEDICINE

## 2022-09-04 PROCEDURE — 11000008

## 2022-09-04 PROCEDURE — 27000934

## 2022-09-04 PROCEDURE — 82962 GLUCOSE BLOOD TEST: CPT

## 2022-09-04 PROCEDURE — 27000952

## 2022-09-04 PROCEDURE — 25000003 PHARM REV CODE 250: Performed by: INTERNAL MEDICINE

## 2022-09-04 PROCEDURE — 27000221 HC OXYGEN, UP TO 24 HOURS

## 2022-09-04 PROCEDURE — 99233 PR SUBSEQUENT HOSPITAL CARE,LEVL III: ICD-10-PCS | Mod: ,,, | Performed by: NURSE PRACTITIONER

## 2022-09-04 PROCEDURE — 99233 SBSQ HOSP IP/OBS HIGH 50: CPT | Mod: ,,, | Performed by: NURSE PRACTITIONER

## 2022-09-04 PROCEDURE — 25000003 PHARM REV CODE 250: Performed by: NURSE PRACTITIONER

## 2022-09-04 PROCEDURE — 94761 N-INVAS EAR/PLS OXIMETRY MLT: CPT

## 2022-09-04 PROCEDURE — 63600175 PHARM REV CODE 636 W HCPCS: Performed by: FAMILY MEDICINE

## 2022-09-04 PROCEDURE — 94660 CPAP INITIATION&MGMT: CPT

## 2022-09-04 PROCEDURE — 99900035 HC TECH TIME PER 15 MIN (STAT)

## 2022-09-04 RX ADMIN — ACETAMINOPHEN 650 MG: 325 TABLET, FILM COATED ORAL at 01:09

## 2022-09-04 RX ADMIN — APIXABAN 2.5 MG: 2.5 TABLET, FILM COATED ORAL at 09:09

## 2022-09-04 RX ADMIN — FOLIC ACID 1000 MCG: 1 TABLET ORAL at 06:09

## 2022-09-04 RX ADMIN — MIDODRINE HYDROCHLORIDE 10 MG: 5 TABLET ORAL at 08:09

## 2022-09-04 RX ADMIN — MIDODRINE HYDROCHLORIDE 10 MG: 5 TABLET ORAL at 03:09

## 2022-09-04 RX ADMIN — Medication 3 MG: at 09:09

## 2022-09-04 RX ADMIN — LEVOTHYROXINE SODIUM 100 MCG: 100 TABLET ORAL at 06:09

## 2022-09-04 RX ADMIN — ATORVASTATIN CALCIUM 40 MG: 40 TABLET, FILM COATED ORAL at 09:09

## 2022-09-04 RX ADMIN — APIXABAN 2.5 MG: 2.5 TABLET, FILM COATED ORAL at 08:09

## 2022-09-04 RX ADMIN — ASPIRIN 81 MG: 81 TABLET, COATED ORAL at 08:09

## 2022-09-04 RX ADMIN — HYDROCORTISONE ACETATE 25 MG: 25 SUPPOSITORY RECTAL at 09:09

## 2022-09-04 RX ADMIN — MIDODRINE HYDROCHLORIDE 10 MG: 5 TABLET ORAL at 09:09

## 2022-09-04 RX ADMIN — MEGESTROL ACETATE 40 MG: 40 TABLET ORAL at 06:09

## 2022-09-04 RX ADMIN — HYDROCODONE BITARTRATE AND ACETAMINOPHEN 1 TABLET: 10; 325 TABLET ORAL at 04:09

## 2022-09-04 RX ADMIN — GABAPENTIN 200 MG: 100 CAPSULE ORAL at 09:09

## 2022-09-04 RX ADMIN — ESCITALOPRAM OXALATE 10 MG: 10 TABLET ORAL at 09:09

## 2022-09-04 RX ADMIN — PANTOPRAZOLE SODIUM 40 MG: 40 TABLET, DELAYED RELEASE ORAL at 08:09

## 2022-09-04 RX ADMIN — HYDROCODONE BITARTRATE AND ACETAMINOPHEN 1 TABLET: 10; 325 TABLET ORAL at 06:09

## 2022-09-04 RX ADMIN — Medication 5000 UNITS: at 08:09

## 2022-09-04 RX ADMIN — TRAZODONE HYDROCHLORIDE 50 MG: 50 TABLET ORAL at 09:09

## 2022-09-04 RX ADMIN — INSULIN ASPART 1 UNITS: 100 INJECTION, SOLUTION INTRAVENOUS; SUBCUTANEOUS at 10:09

## 2022-09-04 RX ADMIN — ALLOPURINOL 100 MG: 100 TABLET ORAL at 08:09

## 2022-09-04 RX ADMIN — HYDROCORTISONE ACETATE 25 MG: 25 SUPPOSITORY RECTAL at 08:09

## 2022-09-04 NOTE — NURSING
09/04/2022 0600 Pt was crying and begging to go to a regular room. Pt also stated she was going to call her brother and tell him to bring her a gun and she was going to blow her head off.    0730 Notified Carlton Asencio pt situation and what she stated per secure chat. KARELY Asencio stated ok. Reported to JAY Valdez and voiced understanding.

## 2022-09-04 NOTE — PLAN OF CARE
Problem: Bariatric Environmental Safety  Goal: Safety Maintained with Care  Outcome: Ongoing, Progressing     Problem: Impaired Wound Healing  Goal: Optimal Wound Healing  Outcome: Ongoing, Progressing     Problem: Device-Related Complication Risk (Hemodialysis)  Goal: Safe, Effective Therapy Delivery  Outcome: Ongoing, Progressing     Problem: Infection (Hemodialysis)  Goal: Absence of Infection Signs and Symptoms  Outcome: Ongoing, Progressing

## 2022-09-04 NOTE — PROGRESS NOTES
[facility]  Pulmonology  Progress Note    Patient Name: Michelle Nunes  MRN: [id number]  Admission Date: 8/27/2022  Hospital Length of Stay: 8 days  Code Status: Full Code  Attending Provider: Donis Shi DO  Primary Care Provider: Primary Doctor No   Principal Problem: Infection of amputation stump    Subjective:     Interval History: Pt resting in bed. Had some confusion overnight- oriented at present. No distress noted. Wants to go to regular floor. Remains off levophed with wide variations in BP     Discussed plan of care with Barbi- daughter, verbalizes understanding. Questions answered.     Objective:     Vital Signs (Most Recent):  Temp: 98.3 °F (36.8 °C) (09/04/22 0300)  Pulse: 106 (09/04/22 0700)  Resp: (!) 9 (09/04/22 0700)  BP: 105/86 (09/04/22 0700)  SpO2: 98 % (09/04/22 0700)   Vital Signs (24h Range):  Temp:  [98.3 °F (36.8 °C)-98.6 °F (37 °C)] 98.3 °F (36.8 °C)  Pulse:  [] 106  Resp:  [9-22] 9  SpO2:  [90 %-100 %] 98 %  BP: ()/() 105/86     Weight: 122.7 kg (270 lb 8.1 oz)  Body mass index is 47.92 kg/m².    No intake or output data in the 24 hours ending 09/04/22 1116    Physical Exam  Vitals and nursing note reviewed.   Constitutional:       General: She is not in acute distress.     Appearance: She is obese. She is not ill-appearing.   HENT:      Head: Normocephalic and atraumatic.      Right Ear: External ear normal.      Left Ear: External ear normal.      Mouth/Throat:      Mouth: Mucous membranes are moist.      Pharynx: Oropharynx is clear.   Eyes:      Extraocular Movements: Extraocular movements intact.      Conjunctiva/sclera: Conjunctivae normal.      Pupils: Pupils are equal, round, and reactive to light.   Cardiovascular:      Rate and Rhythm: Normal rate and regular rhythm.      Heart sounds: Normal heart sounds.   Pulmonary:      Effort: No respiratory distress.      Breath sounds: Normal breath sounds. No wheezing or rales.    Abdominal:      General: Bowel sounds are normal.      Palpations: Abdomen is soft.   Musculoskeletal:         General: Normal range of motion.      Cervical back: Normal range of motion.      Comments: Left amputation    Skin:     General: Skin is warm.      Coloration: Skin is not pale.      Comments: Sacral, right heel, amputation site with wounds   Neurological:      Mental Status: She is alert.      Comments: Intermittent confusion    Psychiatric:         Behavior: Behavior normal.       Vents:  Oxygen Concentration (%): 32 (09/04/22 0800)    Lines/Drains/Airways       Central Venous Catheter Line  Duration                  Hemodialysis Catheter left subclavian -- days              Peripheral Intravenous Line  Duration                  Peripheral IV - Single Lumen 08/28/22 1000 22 G Posterior;Right Wrist 7 days                    Significant Labs:    CBC/Anemia Profile:  No results for input(s): WBC, HGB, HCT, PLT, MCV, RDW, IRON, FERRITIN, RETIC, FOLATE, [id number], OCCULTBLOOD in the last 48 hours.     Chemistries:  Recent Labs   Lab 09/03/22  1834   *   K 4.8      CO2 22   BUN 24*   CREATININE 5.45*   CALCIUM 8.7   ALBUMIN 1.7*   PROT 6.1*   BILITOT 0.5   ALKPHOS 88   ALT 13   AST 24   MG 1.8   PHOS 3.2       All pertinent labs within the past 24 hours have been reviewed.    Significant Imaging:  I have reviewed all pertinent imaging results/findings within the past 24 hours.    Assessment/Plan:     * Infection of amputation stump  Wound care - cultures reviewed - sensitive to Azactam   9/2- has completed abx     Morbid obesity with BMI of 50.0-59.9, adult  Complicates all levels of care    Acute hypercapnic respiratory failure  Hx ROXANN, OHS -- needs to use bipap every night   Pt placed on Bipap this morning --- ABGs returned - 7.27/62/150   - will change settings and repeat labs in 1 hour   8/29 Awake and alert this am. Placing on nasal cannula during the day and bipap at night  On nasal  cannula this am. Slept with the bipap overnight. Continue with current care  8/31- Did not wear bipap overnight and now she is confused. I am going to check an ABG. Place back on bipap. She had an episode of emesis last night and they held the bipap. We are not going to be able to hold the bipap overnight or she is going to have issues with CO2 retention  9/1- better this am. We are going to place back on nasal cannula this am. She will continue with bipap at night.  9/2- NC at present; continue bipap overnight     Hypotension  BP very labile -- seems to get worse after dialysis - started on Levophed and transferred to [facility] last night  - midodrine increased yesterday    8/29 BP looks ok this am  Now hypertensive at times  8/31- she remains on pressor. Weaning as able. MAP goal of 60  9/1- likely not getting accurate blood pressures as she has wide fluctuations in her pressures. 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  Continue wound care     Atrial fibrillation, controlled  Rate controlled- continue eliquis    Type 2 diabetes mellitus  Glucose <180  Continue with current care  She is on 10 units of levemir and mild sliding scale  9/2 glucose controlled with current treatment  9/4  - levemir has been decreased as BG trend had been less than 180  - BG trend <120 at present   - will d/c levemir and cover with SSI if needed     ESRD on dialysis  Continue with dialysis per nephrology  T/TH/ Sat  9/2- MWF HD     Deep vein thrombosis (DVT) of lower extremity  Dopplers negative here   She is on eliquis 2.5 bid    Diabetic ulcer of right heel associated with type 2 diabetes mellitus  Continue with  wound care       Anticipate transfer to floor tomorrow with hospitalist to assume care if BP reading remain stable.     KARELY Asencio/GRANT  Pulmonology  [facility]

## 2022-09-04 NOTE — ASSESSMENT & PLAN NOTE
Glucose <180  Continue with current care  She is on 10 units of levemir and mild sliding scale  9/2 glucose controlled with current treatment  9/4  - levemir has been decreased as BG trend had been less than 180  - BG trend <120 at present   - will d/c levemir and cover with SSI if needed

## 2022-09-04 NOTE — ASSESSMENT & PLAN NOTE
BP very labile -- seems to get worse after dialysis - started on Levophed and transferred to [facility] last night  - midodrine increased yesterday    8/29 BP looks ok this am  Now hypertensive at times  8/31- she remains on pressor. Weaning as able. MAP goal of 60  9/1- likely not getting accurate blood pressures as she has wide fluctuations in her pressures. She remains on some levophed  9/2- wean levophed as able; BP with large fluctuations in readings- questionable accuracy   9/3- will hold levo as BP is improved today with smaller fluctuations in readings; maintain MAP >60   9/4- BP remains stable with levophed infusion off

## 2022-09-04 NOTE — SUBJECTIVE & OBJECTIVE
Interval History: Pt resting in bed. Had some confusion overnight- oriented at present. No distress noted. Wants to go to regular floor. Remains off levophed with wide variations in BP     Discussed plan of care with Barbi- daughter, verbalizes understanding. Questions answered.     Objective:     Vital Signs (Most Recent):  Temp: 98.3 °F (36.8 °C) (09/04/22 0300)  Pulse: 106 (09/04/22 0700)  Resp: (!) 9 (09/04/22 0700)  BP: 105/86 (09/04/22 0700)  SpO2: 98 % (09/04/22 0700)   Vital Signs (24h Range):  Temp:  [98.3 °F (36.8 °C)-98.6 °F (37 °C)] 98.3 °F (36.8 °C)  Pulse:  [] 106  Resp:  [9-22] 9  SpO2:  [90 %-100 %] 98 %  BP: ()/() 105/86     Weight: 122.7 kg (270 lb 8.1 oz)  Body mass index is 47.92 kg/m².    No intake or output data in the 24 hours ending 09/04/22 1116    Physical Exam  Vitals and nursing note reviewed.   Constitutional:       General: She is not in acute distress.     Appearance: She is obese. She is not ill-appearing.   HENT:      Head: Normocephalic and atraumatic.      Right Ear: External ear normal.      Left Ear: External ear normal.      Mouth/Throat:      Mouth: Mucous membranes are moist.      Pharynx: Oropharynx is clear.   Eyes:      Extraocular Movements: Extraocular movements intact.      Conjunctiva/sclera: Conjunctivae normal.      Pupils: Pupils are equal, round, and reactive to light.   Cardiovascular:      Rate and Rhythm: Normal rate and regular rhythm.      Heart sounds: Normal heart sounds.   Pulmonary:      Effort: No respiratory distress.      Breath sounds: Normal breath sounds. No wheezing or rales.   Abdominal:      General: Bowel sounds are normal.      Palpations: Abdomen is soft.   Musculoskeletal:         General: Normal range of motion.      Cervical back: Normal range of motion.      Comments: Left amputation    Skin:     General: Skin is warm.      Coloration: Skin is not pale.      Comments: Sacral, right heel, amputation site with wounds    Neurological:      Mental Status: She is alert.      Comments: Intermittent confusion    Psychiatric:         Behavior: Behavior normal.       Vents:  Oxygen Concentration (%): 32 (09/04/22 0800)    Lines/Drains/Airways       Central Venous Catheter Line  Duration                  Hemodialysis Catheter left subclavian -- days              Peripheral Intravenous Line  Duration                  Peripheral IV - Single Lumen 08/28/22 1000 22 G Posterior;Right Wrist 7 days                    Significant Labs:    CBC/Anemia Profile:  No results for input(s): WBC, HGB, HCT, PLT, MCV, RDW, IRON, FERRITIN, RETIC, FOLATE, [id number], OCCULTBLOOD in the last 48 hours.     Chemistries:  Recent Labs   Lab 09/03/22  1834   *   K 4.8      CO2 22   BUN 24*   CREATININE 5.45*   CALCIUM 8.7   ALBUMIN 1.7*   PROT 6.1*   BILITOT 0.5   ALKPHOS 88   ALT 13   AST 24   MG 1.8   PHOS 3.2       All pertinent labs within the past 24 hours have been reviewed.    Significant Imaging:  I have reviewed all pertinent imaging results/findings within the past 24 hours.

## 2022-09-04 NOTE — PROGRESS NOTES
[facility]  Nephrology  Progress Note    Patient Name: Michelle Nunes  MRN: [id number]  Admission Date: 8/27/2022  Hospital Length of Stay: 7 days  Attending Provider: Donis Shi DO   Primary Care Physician: Primary Doctor No  Principal Problem:Infection of amputation stump    Consults  Subjective:     Interval History:The patient is now off vasopressors.She reports having severe pain    Review of patient's allergies indicates:   Allergen Reactions    Milk containing products     Pcn [penicillins]      Current Facility-Administered Medications   Medication Frequency    0.9%  NaCl infusion PRN    acetaminophen tablet 650 mg Q4H PRN    allopurinoL tablet 100 mg Daily    apixaban tablet 2.5 mg BID    aspirin EC tablet 81 mg Daily    atorvastatin tablet 40 mg QHS    bisacodyL EC tablet 10 mg Daily PRN    collagenase ointment Daily PRN    dextromethorphan-guaiFENesin  mg/5 ml liquid 10 mL Q6H PRN    dextrose 50% injection 12.5 g PRN    dextrose 50% injection 25 g PRN    EScitalopram oxalate tablet 10 mg QHS    folic acid tablet 1,000 mcg QAM    gabapentin capsule 200 mg QHS    glucagon (human recombinant) injection 1 mg PRN    heparin (porcine) injection 4,000 Units PRN    HYDROcodone-acetaminophen  mg per tablet 1 tablet Q8H PRN    hydrocortisone suppository 25 mg BID    insulin aspart U-100 injection 0-5 Units QID (AC + HS) PRN    insulin detemir U-100 injection 5 Units QHS    levothyroxine tablet 100 mcg QAM    megestroL tablet 40 mg QAM    melatonin tablet 3 mg QHS    midodrine tablet 10 mg TID    NORepinephrine 8 mg in dextrose 5 % 250 mL infusion Continuous    ondansetron injection 4 mg Q8H PRN    pantoprazole EC tablet 40 mg Daily    simethicone chewable tablet 80 mg TID PRN    sodium chloride 0.9% flush 10 mL Q12H PRN    traZODone tablet 50 mg Nightly PRN    vitamin D 1000 units tablet 5,000 Units Daily       Objective:     Vital Signs (Most Recent):  Temp:  98.5 °F (36.9 °C) (09/03/22 1930)  Pulse: 96 (09/03/22 1930)  Resp: 11 (09/03/22 1930)  BP: (!) 124/102 (09/03/22 1930)  SpO2: 100 % (09/03/22 1930)  O2 Device (Oxygen Therapy): nasal cannula (09/03/22 1947)   Vital Signs (24h Range):  Temp:  [97.5 °F (36.4 °C)-99.5 °F (37.5 °C)] 98.5 °F (36.9 °C)  Pulse:  [] 96  Resp:  [8-22] 11  SpO2:  [90 %-100 %] 100 %  BP: ()/() 124/102     Weight: 126.4 kg (278 lb 10.6 oz) (09/03/22 0628)  Body mass index is 49.36 kg/m².  Body surface area is 2.37 meters squared.    I/O last 3 completed shifts:  In: 500 [Other:500]  Out: 1500 [Other:1500]    Physical Exam  Lungs-clear  Cor-no murmur  Abd-soft    Significant Labs:sureCMP:   Recent Labs   Lab 09/03/22  1834   GLU 89   CALCIUM 8.7   ALBUMIN 1.7*   PROT 6.1*   *   K 4.8   CO2 22      BUN 24*   CREATININE 5.45*   ALKPHOS 88   ALT 13   AST 24   BILITOT 0.5     All labs within the past 24 hours have been reviewed.    Significant Imaging:      Assessment/Plan:     Active Diagnoses:    Diagnosis Date Noted POA    PRINCIPAL PROBLEM:  Infection of amputation stump [T87.40] 08/25/2022 Yes    Morbid obesity with BMI of 50.0-59.9, adult [E66.01, Z68.43] 08/29/2022 Not Applicable    Acute hypercapnic respiratory failure [J96.02] 08/28/2022 Yes    PVD (peripheral vascular disease) [I73.9] 08/27/2022 Unknown    Hypotension [I95.9] 08/26/2022 Yes    Diabetic ulcer of right heel associated with type 2 diabetes mellitus [E11.621, L97.419] 08/25/2022 Yes    Deep vein thrombosis (DVT) of lower extremity [I82.409] 08/25/2022 Yes    ESRD on dialysis [N18.6, Z99.2] 08/25/2022 Not Applicable    Pressure injury of buttock, unstageable [L89.300] 08/25/2022 Yes    Atrial fibrillation, controlled [I48.91] 08/25/2022 Yes    Type 2 diabetes mellitus [E11.9] 08/25/2022 Yes    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 I will follow-up with patient. Please contact us if you have any additional questions.    Jose Carlos Bishop MD  Nephrology  [facility]

## 2022-09-05 LAB
ANION GAP SERPL CALCULATED.3IONS-SCNC: 12 MMOL/L (ref 7–16)
BASOPHILS # BLD AUTO: 0.04 K/UL (ref 0–0.2)
BASOPHILS NFR BLD AUTO: 0.3 % (ref 0–1)
BUN SERPL-MCNC: 25 MG/DL (ref 7–18)
BUN/CREAT SERPL: 5 (ref 6–20)
CALCIUM SERPL-MCNC: 9.1 MG/DL (ref 8.5–10.1)
CHLORIDE SERPL-SCNC: 101 MMOL/L (ref 98–107)
CO2 SERPL-SCNC: 28 MMOL/L (ref 21–32)
CREAT SERPL-MCNC: 4.83 MG/DL (ref 0.55–1.02)
DIFFERENTIAL METHOD BLD: ABNORMAL
EGFR (NO RACE VARIABLE) (RUSH/TITUS): 9 ML/MIN/1.73M²
EOSINOPHIL # BLD AUTO: 0.06 K/UL (ref 0–0.5)
EOSINOPHIL NFR BLD AUTO: 0.4 % (ref 1–4)
ERYTHROCYTE [DISTWIDTH] IN BLOOD BY AUTOMATED COUNT: 18.1 % (ref 11.5–14.5)
GLUCOSE SERPL-MCNC: 115 MG/DL (ref 70–105)
GLUCOSE SERPL-MCNC: 144 MG/DL (ref 70–105)
GLUCOSE SERPL-MCNC: 147 MG/DL (ref 70–105)
GLUCOSE SERPL-MCNC: 155 MG/DL (ref 70–105)
GLUCOSE SERPL-MCNC: 161 MG/DL (ref 74–106)
HCT VFR BLD AUTO: 29.2 % (ref 38–47)
HGB BLD-MCNC: 8.8 G/DL (ref 12–16)
IMM GRANULOCYTES # BLD AUTO: 0.13 K/UL (ref 0–0.04)
IMM GRANULOCYTES NFR BLD: 0.9 % (ref 0–0.4)
LYMPHOCYTES # BLD AUTO: 0.91 K/UL (ref 1–4.8)
LYMPHOCYTES NFR BLD AUTO: 6.6 % (ref 27–41)
MCH RBC QN AUTO: 30.2 PG (ref 27–31)
MCHC RBC AUTO-ENTMCNC: 30.1 G/DL (ref 32–36)
MCV RBC AUTO: 100.3 FL (ref 80–96)
MONOCYTES # BLD AUTO: 0.41 K/UL (ref 0–0.8)
MONOCYTES NFR BLD AUTO: 3 % (ref 2–6)
MPC BLD CALC-MCNC: 10.2 FL (ref 9.4–12.4)
NEUTROPHILS # BLD AUTO: 12.26 K/UL (ref 1.8–7.7)
NEUTROPHILS NFR BLD AUTO: 88.8 % (ref 53–65)
NRBC # BLD AUTO: 0.04 X10E3/UL
NRBC, AUTO (.00): 0.3 %
PLATELET # BLD AUTO: 223 K/UL (ref 150–400)
POTASSIUM SERPL-SCNC: 3.8 MMOL/L (ref 3.5–5.1)
RBC # BLD AUTO: 2.91 M/UL (ref 4.2–5.4)
SODIUM SERPL-SCNC: 137 MMOL/L (ref 136–145)
WBC # BLD AUTO: 13.81 K/UL (ref 4.5–11)

## 2022-09-05 PROCEDURE — 99233 SBSQ HOSP IP/OBS HIGH 50: CPT | Mod: ,,, | Performed by: NURSE PRACTITIONER

## 2022-09-05 PROCEDURE — 25000003 PHARM REV CODE 250: Performed by: NURSE PRACTITIONER

## 2022-09-05 PROCEDURE — 80100014 HC HEMODIALYSIS 1:1

## 2022-09-05 PROCEDURE — 85025 COMPLETE CBC W/AUTO DIFF WBC: CPT | Performed by: NURSE PRACTITIONER

## 2022-09-05 PROCEDURE — 94660 CPAP INITIATION&MGMT: CPT

## 2022-09-05 PROCEDURE — 82962 GLUCOSE BLOOD TEST: CPT

## 2022-09-05 PROCEDURE — 25000003 PHARM REV CODE 250: Performed by: FAMILY MEDICINE

## 2022-09-05 PROCEDURE — 25000003 PHARM REV CODE 250: Performed by: INTERNAL MEDICINE

## 2022-09-05 PROCEDURE — 27000952

## 2022-09-05 PROCEDURE — 99233 PR SUBSEQUENT HOSPITAL CARE,LEVL III: ICD-10-PCS | Mod: ,,, | Performed by: NURSE PRACTITIONER

## 2022-09-05 PROCEDURE — 36415 COLL VENOUS BLD VENIPUNCTURE: CPT | Performed by: NURSE PRACTITIONER

## 2022-09-05 PROCEDURE — 27000934

## 2022-09-05 PROCEDURE — 27000940

## 2022-09-05 PROCEDURE — 80048 BASIC METABOLIC PNL TOTAL CA: CPT | Performed by: NURSE PRACTITIONER

## 2022-09-05 PROCEDURE — 63600175 PHARM REV CODE 636 W HCPCS: Performed by: FAMILY MEDICINE

## 2022-09-05 PROCEDURE — 27000221 HC OXYGEN, UP TO 24 HOURS

## 2022-09-05 PROCEDURE — 99900035 HC TECH TIME PER 15 MIN (STAT)

## 2022-09-05 PROCEDURE — 94761 N-INVAS EAR/PLS OXIMETRY MLT: CPT

## 2022-09-05 PROCEDURE — 11000008

## 2022-09-05 RX ORDER — HYDROXYZINE PAMOATE 25 MG/1
25 CAPSULE ORAL EVERY 6 HOURS PRN
Status: DISCONTINUED | OUTPATIENT
Start: 2022-09-05 | End: 2022-09-14 | Stop reason: HOSPADM

## 2022-09-05 RX ADMIN — ALLOPURINOL 100 MG: 100 TABLET ORAL at 09:09

## 2022-09-05 RX ADMIN — APIXABAN 2.5 MG: 2.5 TABLET, FILM COATED ORAL at 09:09

## 2022-09-05 RX ADMIN — COLLAGENASE SANTYL: 250 OINTMENT TOPICAL at 03:09

## 2022-09-05 RX ADMIN — HYDROXYZINE PAMOATE 25 MG: 25 CAPSULE ORAL at 10:09

## 2022-09-05 RX ADMIN — Medication 3 MG: at 09:09

## 2022-09-05 RX ADMIN — GABAPENTIN 200 MG: 100 CAPSULE ORAL at 09:09

## 2022-09-05 RX ADMIN — MIDODRINE HYDROCHLORIDE 10 MG: 5 TABLET ORAL at 09:09

## 2022-09-05 RX ADMIN — HYDROCORTISONE ACETATE 25 MG: 25 SUPPOSITORY RECTAL at 09:09

## 2022-09-05 RX ADMIN — PANTOPRAZOLE SODIUM 40 MG: 40 TABLET, DELAYED RELEASE ORAL at 09:09

## 2022-09-05 RX ADMIN — MIDODRINE HYDROCHLORIDE 10 MG: 5 TABLET ORAL at 03:09

## 2022-09-05 RX ADMIN — HEPARIN SODIUM 4000 UNITS: 1000 INJECTION INTRAVENOUS; SUBCUTANEOUS at 10:09

## 2022-09-05 RX ADMIN — LEVOTHYROXINE SODIUM 100 MCG: 100 TABLET ORAL at 06:09

## 2022-09-05 RX ADMIN — ESCITALOPRAM OXALATE 10 MG: 10 TABLET ORAL at 09:09

## 2022-09-05 RX ADMIN — Medication 5000 UNITS: at 09:09

## 2022-09-05 RX ADMIN — MEGESTROL ACETATE 40 MG: 40 TABLET ORAL at 06:09

## 2022-09-05 RX ADMIN — ATORVASTATIN CALCIUM 40 MG: 40 TABLET, FILM COATED ORAL at 09:09

## 2022-09-05 RX ADMIN — HYDROCODONE BITARTRATE AND ACETAMINOPHEN 1 TABLET: 10; 325 TABLET ORAL at 10:09

## 2022-09-05 RX ADMIN — ASPIRIN 81 MG: 81 TABLET, COATED ORAL at 09:09

## 2022-09-05 RX ADMIN — FOLIC ACID 1000 MCG: 1 TABLET ORAL at 06:09

## 2022-09-05 RX ADMIN — HYDROCODONE BITARTRATE AND ACETAMINOPHEN 1 TABLET: 10; 325 TABLET ORAL at 09:09

## 2022-09-05 RX ADMIN — SODIUM CHLORIDE: 9 INJECTION, SOLUTION INTRAVENOUS at 10:09

## 2022-09-05 RX ADMIN — ACETAMINOPHEN 650 MG: 325 TABLET, FILM COATED ORAL at 03:09

## 2022-09-05 NOTE — SUBJECTIVE & OBJECTIVE
Interval History: Pt resting in bed. No acute events overnight. Does have some pain to bottom and legs- norco on profile. Is tearful during exam- anxious about going home and getting out of the hospital- will add vistaril to help.     Objective:     Vital Signs (Most Recent):  Temp: 97.5 °F (36.4 °C) (09/05/22 0800)  Pulse: 100 (09/05/22 1045)  Resp: 10 (09/05/22 1045)  BP: (!) 122/99 (09/05/22 1045)  SpO2: (!) 92 % (09/05/22 1045)   Vital Signs (24h Range):  Temp:  [97.3 °F (36.3 °C)-97.5 °F (36.4 °C)] 97.5 °F (36.4 °C)  Pulse:  [] 100  Resp:  [7-22] 10  SpO2:  [79 %-100 %] 92 %  BP: ()/() 122/99     Weight: 129.9 kg (286 lb 6 oz)  Body mass index is 50.73 kg/m².    No intake or output data in the 24 hours ending 09/05/22 1104    Physical Exam  Vitals and nursing note reviewed.   Constitutional:       General: She is not in acute distress.     Appearance: She is obese. She is not ill-appearing.   HENT:      Head: Normocephalic and atraumatic.      Right Ear: External ear normal.      Left Ear: External ear normal.      Mouth/Throat:      Mouth: Mucous membranes are moist.      Pharynx: Oropharynx is clear.   Eyes:      Extraocular Movements: Extraocular movements intact.      Conjunctiva/sclera: Conjunctivae normal.      Pupils: Pupils are equal, round, and reactive to light.   Cardiovascular:      Rate and Rhythm: Normal rate and regular rhythm.      Heart sounds: Normal heart sounds.   Pulmonary:      Effort: No respiratory distress.      Breath sounds: Normal breath sounds. No wheezing or rales.   Abdominal:      General: Bowel sounds are normal.      Palpations: Abdomen is soft.   Musculoskeletal:         General: Normal range of motion.      Cervical back: Normal range of motion.      Comments: Left amputation    Skin:     General: Skin is warm.      Capillary Refill: Capillary refill takes 2 to 3 seconds.      Coloration: Skin is not pale.      Comments: Sacral, right heel, amputation site  with wounds   Neurological:      Mental Status: She is alert.      Comments: Intermittent confusion    Psychiatric:         Mood and Affect: Affect is labile and tearful.         Behavior: Behavior normal.       Vents:  Oxygen Concentration (%): 30 (09/05/22 0435)    Lines/Drains/Airways       Central Venous Catheter Line  Duration                  Hemodialysis Catheter left subclavian -- days              Peripheral Intravenous Line  Duration                  Peripheral IV - Single Lumen 08/28/22 1000 22 G Posterior;Right Wrist 8 days                    Significant Labs:    CBC/Anemia Profile:  Recent Labs   Lab 09/05/22  1012   WBC 13.81*   HGB 8.8*   HCT 29.2*      .3*   RDW 18.1*        Chemistries:  Recent Labs   Lab 09/03/22  1834 09/05/22  1012   * 137   K 4.8 3.8    101   CO2 22 28   BUN 24* 25*   CREATININE 5.45* 4.83*   CALCIUM 8.7 9.1   ALBUMIN 1.7*  --    PROT 6.1*  --    BILITOT 0.5  --    ALKPHOS 88  --    ALT 13  --    AST 24  --    MG 1.8  --    PHOS 3.2  --        All pertinent labs within the past 24 hours have been reviewed.    Significant Imaging:  I have reviewed all pertinent imaging results/findings within the past 24 hours.

## 2022-09-05 NOTE — PROGRESS NOTES
[facility]  Nephrology  Progress Note    Patient Name: Michelle Nunes  MRN: [id number]  Admission Date: 8/27/2022  Hospital Length of Stay: 8 days  Attending Provider: Donis Shi DO   Primary Care Physician: Primary Doctor No  Principal Problem:Infection of amputation stump    Consults  Subjective:     Interval History: The patient wants to go upstairs      Review of patient's allergies indicates:   Allergen Reactions    Milk containing products     Pcn [penicillins]      Current Facility-Administered Medications   Medication Frequency    0.9%  NaCl infusion PRN    acetaminophen tablet 650 mg Q4H PRN    allopurinoL tablet 100 mg Daily    apixaban tablet 2.5 mg BID    aspirin EC tablet 81 mg Daily    atorvastatin tablet 40 mg QHS    bisacodyL EC tablet 10 mg Daily PRN    collagenase ointment Daily PRN    dextromethorphan-guaiFENesin  mg/5 ml liquid 10 mL Q6H PRN    dextrose 50% injection 12.5 g PRN    dextrose 50% injection 25 g PRN    EScitalopram oxalate tablet 10 mg QHS    folic acid tablet 1,000 mcg QAM    gabapentin capsule 200 mg QHS    glucagon (human recombinant) injection 1 mg PRN    heparin (porcine) injection 4,000 Units PRN    HYDROcodone-acetaminophen  mg per tablet 1 tablet Q8H PRN    hydrocortisone suppository 25 mg BID    insulin aspart U-100 injection 0-5 Units QID (AC + HS) PRN    levothyroxine tablet 100 mcg QAM    megestroL tablet 40 mg QAM    melatonin tablet 3 mg QHS    midodrine tablet 10 mg TID    NORepinephrine 8 mg in dextrose 5 % 250 mL infusion Continuous    ondansetron injection 4 mg Q8H PRN    pantoprazole EC tablet 40 mg Daily    simethicone chewable tablet 80 mg TID PRN    sodium chloride 0.9% flush 10 mL Q12H PRN    traZODone tablet 50 mg Nightly PRN    vitamin D 1000 units tablet 5,000 Units Daily       Objective:     Vital Signs (Most Recent):  Temp: 97.4 °F (36.3 °C) (09/04/22 1930)  Pulse: 91 (09/04/22 1930)  Resp: 12  (09/04/22 1930)  BP: (!) 80/36 (09/04/22 1930)  SpO2: 99 % (09/04/22 1930)  O2 Device (Oxygen Therapy): nasal cannula (09/04/22 1957)   Vital Signs (24h Range):  Temp:  [97.3 °F (36.3 °C)-98.6 °F (37 °C)] 97.4 °F (36.3 °C)  Pulse:  [] 91  Resp:  [8-20] 12  SpO2:  [95 %-100 %] 99 %  BP: ()/() 80/36     Weight: 122.7 kg (270 lb 8.1 oz) (09/04/22 0415)  Body mass index is 47.92 kg/m².  Body surface area is 2.34 meters squared.    No intake/output data recorded.    Physical Exam  Lungs-clear  Cor-no murmur  Abd-soft  Significant Labs:sureCMP:   Recent Labs   Lab 09/03/22  1834   GLU 89   CALCIUM 8.7   ALBUMIN 1.7*   PROT 6.1*   *   K 4.8   CO2 22      BUN 24*   CREATININE 5.45*   ALKPHOS 88   ALT 13   AST 24   BILITOT 0.5     All labs within the past 24 hours have been reviewed.    Significant Imaging:      Assessment/Plan:     Active Diagnoses:    Diagnosis Date Noted POA    PRINCIPAL PROBLEM:  Infection of amputation stump [T87.40] 08/25/2022 Yes    Morbid obesity with BMI of 50.0-59.9, adult [E66.01, Z68.43] 08/29/2022 Not Applicable    Acute hypercapnic respiratory failure [J96.02] 08/28/2022 Yes    PVD (peripheral vascular disease) [I73.9] 08/27/2022 Unknown    Hypotension [I95.9] 08/26/2022 Yes    Diabetic ulcer of right heel associated with type 2 diabetes mellitus [E11.621, L97.419] 08/25/2022 Yes    Deep vein thrombosis (DVT) of lower extremity [I82.409] 08/25/2022 Yes    ESRD on dialysis [N18.6, Z99.2] 08/25/2022 Not Applicable    Pressure injury of buttock, unstageable [L89.300] 08/25/2022 Yes    Atrial fibrillation, controlled [I48.91] 08/25/2022 Yes    Type 2 diabetes mellitus [E11.9] 08/25/2022 Yes    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 I will follow-up with patient. Please contact us if you have any additional questions.    Jose Carlos Bishop MD  Nephrology  [facility]

## 2022-09-05 NOTE — PROGRESS NOTES
[facility]  Pulmonology  Progress Note    Patient Name: Michelle Nunes  MRN: [id number]  Admission Date: 8/27/2022  Hospital Length of Stay: 9 days  Code Status: Full Code  Attending Provider: Donis Shi DO  Primary Care Provider: Primary Doctor No   Principal Problem: Infection of amputation stump    Subjective:     Interval History: Pt resting in bed. No acute events overnight. Does have some pain to bottom and legs- norco on profile. Is tearful during exam- anxious about going home and getting out of the hospital- will add vistaril to help.     Objective:     Vital Signs (Most Recent):  Temp: 97.5 °F (36.4 °C) (09/05/22 0800)  Pulse: 100 (09/05/22 1045)  Resp: 10 (09/05/22 1045)  BP: (!) 122/99 (09/05/22 1045)  SpO2: (!) 92 % (09/05/22 1045)   Vital Signs (24h Range):  Temp:  [97.3 °F (36.3 °C)-97.5 °F (36.4 °C)] 97.5 °F (36.4 °C)  Pulse:  [] 100  Resp:  [7-22] 10  SpO2:  [79 %-100 %] 92 %  BP: ()/() 122/99     Weight: 129.9 kg (286 lb 6 oz)  Body mass index is 50.73 kg/m².    No intake or output data in the 24 hours ending 09/05/22 1104    Physical Exam  Vitals and nursing note reviewed.   Constitutional:       General: She is not in acute distress.     Appearance: She is obese. She is not ill-appearing.   HENT:      Head: Normocephalic and atraumatic.      Right Ear: External ear normal.      Left Ear: External ear normal.      Mouth/Throat:      Mouth: Mucous membranes are moist.      Pharynx: Oropharynx is clear.   Eyes:      Extraocular Movements: Extraocular movements intact.      Conjunctiva/sclera: Conjunctivae normal.      Pupils: Pupils are equal, round, and reactive to light.   Cardiovascular:      Rate and Rhythm: Normal rate and regular rhythm.      Heart sounds: Normal heart sounds.   Pulmonary:      Effort: No respiratory distress.      Breath sounds: Normal breath sounds. No wheezing or rales.   Abdominal:      General: Bowel sounds are  normal.      Palpations: Abdomen is soft.   Musculoskeletal:         General: Normal range of motion.      Cervical back: Normal range of motion.      Comments: Left amputation    Skin:     General: Skin is warm.      Capillary Refill: Capillary refill takes 2 to 3 seconds.      Coloration: Skin is not pale.      Comments: Sacral, right heel, amputation site with wounds   Neurological:      Mental Status: She is alert.      Comments: Intermittent confusion    Psychiatric:         Mood and Affect: Affect is labile and tearful.         Behavior: Behavior normal.       Vents:  Oxygen Concentration (%): 30 (09/05/22 0435)    Lines/Drains/Airways       Central Venous Catheter Line  Duration                  Hemodialysis Catheter left subclavian -- days              Peripheral Intravenous Line  Duration                  Peripheral IV - Single Lumen 08/28/22 1000 22 G Posterior;Right Wrist 8 days                    Significant Labs:    CBC/Anemia Profile:  Recent Labs   Lab 09/05/22  1012   WBC 13.81*   HGB 8.8*   HCT 29.2*      .3*   RDW 18.1*        Chemistries:  Recent Labs   Lab 09/03/22  1834 09/05/22  1012   * 137   K 4.8 3.8    101   CO2 22 28   BUN 24* 25*   CREATININE 5.45* 4.83*   CALCIUM 8.7 9.1   ALBUMIN 1.7*  --    PROT 6.1*  --    BILITOT 0.5  --    ALKPHOS 88  --    ALT 13  --    AST 24  --    MG 1.8  --    PHOS 3.2  --        All pertinent labs within the past 24 hours have been reviewed.    Significant Imaging:  I have reviewed all pertinent imaging results/findings within the past 24 hours.    Assessment/Plan:     * Infection of amputation stump  Wound care - cultures reviewed - sensitive to Azactam   9/2- has completed abx     Morbid obesity with BMI of 50.0-59.9, adult  Complicates all levels of care    Acute hypercapnic respiratory failure  Hx ROXANN, OHS -- needs to use bipap every night   Pt placed on Bipap this morning --- ABGs returned - 7.27/62/150   - will change settings  and repeat labs in 1 hour   8/29 Awake and alert this am. Placing on nasal cannula during the day and bipap at night  On nasal cannula this am. Slept with the bipap overnight. Continue with current care  8/31- Did not wear bipap overnight and now she is confused. I am going to check an ABG. Place back on bipap. She had an episode of emesis last night and they held the bipap. We are not going to be able to hold the bipap overnight or she is going to have issues with CO2 retention  9/1- better this am. We are going to place back on nasal cannula this am. She will continue with bipap at night.  9/2- NC at present; continue bipap overnight     Hypotension  BP very labile -- seems to get worse after dialysis - started on Levophed and transferred to [facility] last night  - midodrine increased yesterday    8/29 BP looks ok this am  Now hypertensive at times  8/31- she remains on pressor. Weaning as able. MAP goal of 60  9/1- likely not getting accurate blood pressures as she has wide fluctuations in her pressures. 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  Continue wound care     Atrial fibrillation, controlled  Rate controlled- continue eliquis    Type 2 diabetes mellitus  Glucose <180  Continue with current care  She is on 10 units of levemir and mild sliding scale  9/2 glucose controlled with current treatment  9/4  - levemir has been decreased to 5u as BG trend had been less than 180  - BG trend <120 at present   - will d/c levemir and cover with SSI if needed   9/5  - BG trend less than 200        ESRD on dialysis  Continue with dialysis per  nephrology  T/TH/ Sat  9/2- MWF HD     Deep vein thrombosis (DVT) of lower extremity  Dopplers negative here   She is on eliquis 2.5 bid    Diabetic ulcer of right heel associated with type 2 diabetes mellitus  Continue with wound care           KARELY Asencio/GRANT  Pulmonology  [facility]

## 2022-09-05 NOTE — PLAN OF CARE
Problem: Adult Inpatient Plan of Care  Goal: Plan of Care Review  9/5/2022 0903 by Jacque Gaines RN  Outcome: Ongoing, Progressing  9/5/2022 0857 by Jacque Gaines RN  Outcome: Ongoing, Not Progressing  Goal: Patient-Specific Goal (Individualized)  9/5/2022 0903 by Jacque Gaines RN  Outcome: Ongoing, Progressing  9/5/2022 0857 by Jacque Gaines RN  Outcome: Ongoing, Not Progressing  Goal: Absence of Hospital-Acquired Illness or Injury  9/5/2022 0903 by Jacque Gaines RN  Outcome: Ongoing, Progressing  9/5/2022 0857 by Jacque Gaines RN  Outcome: Ongoing, Not Progressing  Goal: Optimal Comfort and Wellbeing  9/5/2022 0903 by Jacque Gaines RN  Outcome: Ongoing, Progressing  9/5/2022 0857 by Jacque Gaines RN  Outcome: Ongoing, Not Progressing  Goal: Readiness for Transition of Care  9/5/2022 0903 by Jacque Gaines RN  Outcome: Ongoing, Progressing  9/5/2022 0857 by Jacque Gaines RN  Outcome: Ongoing, Not Progressing     Problem: Diabetes Comorbidity  Goal: Blood Glucose Level Within Targeted Range  9/5/2022 0903 by Jacque Gaines RN  Outcome: Ongoing, Progressing  9/5/2022 0857 by Jacque Gaines RN  Outcome: Ongoing, Not Progressing     Problem: Bariatric Environmental Safety  Goal: Safety Maintained with Care  9/5/2022 0903 by Jacque Gaines RN  Outcome: Ongoing, Progressing  9/5/2022 0857 by Jacque Gaines RN  Outcome: Ongoing, Not Progressing     Problem: Infection  Goal: Absence of Infection Signs and Symptoms  9/5/2022 0903 by Jacque Gaines RN  Outcome: Ongoing, Progressing  9/5/2022 0857 by Jacque Gaines RN  Outcome: Ongoing, Not Progressing     Problem: Impaired Wound Healing  Goal: Optimal Wound Healing  9/5/2022 0903 by Jacque Gaines RN  Outcome: Ongoing, Progressing  9/5/2022 0857 by Jacque Gaines RN  Outcome: Ongoing, Not Progressing     Problem: Device-Related Complication Risk (Hemodialysis)  Goal: Safe, Effective Therapy Delivery  9/5/2022 0903 by Jacque  JAY Gaines  Outcome: Ongoing, Progressing  9/5/2022 0857 by Jacque Gaines RN  Outcome: Ongoing, Not Progressing     Problem: Hemodynamic Instability (Hemodialysis)  Goal: Effective Tissue Perfusion  9/5/2022 0903 by Jacque Gaines RN  Outcome: Ongoing, Progressing  9/5/2022 0857 by Jacque Gaines RN  Outcome: Ongoing, Not Progressing     Problem: Infection (Hemodialysis)  Goal: Absence of Infection Signs and Symptoms  9/5/2022 0903 by Jacque Gaines RN  Outcome: Ongoing, Progressing  9/5/2022 0857 by Jacque Gaines RN  Outcome: Ongoing, Not Progressing     Problem: Fall Injury Risk  Goal: Absence of Fall and Fall-Related Injury  9/5/2022 0903 by Jacque Gaines RN  Outcome: Ongoing, Progressing  9/5/2022 0857 by Jacque Gaines RN  Outcome: Ongoing, Not Progressing     Problem: Skin Injury Risk Increased  Goal: Skin Health and Integrity  9/5/2022 0903 by Jacque Gaines RN  Outcome: Ongoing, Progressing  9/5/2022 0857 by Jacque Gaines RN  Outcome: Ongoing, Not Progressing     Problem: Noninvasive Ventilation Acute  Goal: Effective Unassisted Ventilation and Oxygenation  9/5/2022 0903 by Jacque Gaines RN  Outcome: Ongoing, Progressing  9/5/2022 0857 by Jacque Gaines RN  Outcome: Ongoing, Not Progressing     Problem: Restraint, Nonbehavioral (Nonviolent)  Goal: Absence of Harm or Injury  9/5/2022 0903 by Jacque Gaines RN  Outcome: Ongoing, Progressing  9/5/2022 0857 by Jacque Gaines RN  Outcome: Ongoing, Not Progressing     Problem: Gas Exchange Impaired  Goal: Optimal Gas Exchange  9/5/2022 0903 by Jacque Gaines RN  Outcome: Ongoing, Progressing  9/5/2022 0857 by Jacque Gaines RN  Outcome: Ongoing, Not Progressing

## 2022-09-05 NOTE — NURSING
2020 Lying in bed resting.  No noted distress. Skin warm to touch.  Respiration even and unlabored. Left chest wall HD cath intact and secure  with no IV complications. INT in right hand intact and secure with no iv complications at this time. Dressing to right foot, left aka and bilateral buttocks intact and secure. Safety measures ongoing.      2141 Pt given Trazadone 50 mg po for rest.  Will f/u effectiveness.    2241 Lying in bed resting.  No noted distress. Trazadone effective.  Safety measures ongoing.    0000 Lying in bed resting.  No noted distress. Bipap on and in use with no problems at this time.

## 2022-09-05 NOTE — ASSESSMENT & PLAN NOTE
BP very labile -- seems to get worse after dialysis - started on Levophed and transferred to [facility] last night  - midodrine increased yesterday    8/29 BP looks ok this am  Now hypertensive at times  8/31- she remains on pressor. Weaning as able. MAP goal of 60  9/1- likely not getting accurate blood pressures as she has wide fluctuations in her pressures. She remains on some levophed  9/2- wean levophed as able; BP with large fluctuations in readings- questionable accuracy   9/3- will hold levo as BP is improved today with smaller fluctuations in readings; maintain MAP >60   9/4- BP remains stable with levophed infusion off   9/5- levophed remains off; BP stable; HD today

## 2022-09-05 NOTE — ASSESSMENT & PLAN NOTE
Glucose <180  Continue with current care  She is on 10 units of levemir and mild sliding scale  9/2 glucose controlled with current treatment  9/4  - levemir has been decreased to 5u as BG trend had been less than 180  - BG trend <120 at present   - will d/c levemir and cover with SSI if needed   9/5  - BG trend less than 200

## 2022-09-06 LAB
GLUCOSE SERPL-MCNC: 148 MG/DL (ref 70–105)
GLUCOSE SERPL-MCNC: 182 MG/DL (ref 70–105)
GLUCOSE SERPL-MCNC: 186 MG/DL (ref 70–105)
GLUCOSE SERPL-MCNC: 212 MG/DL (ref 70–105)

## 2022-09-06 PROCEDURE — 99233 SBSQ HOSP IP/OBS HIGH 50: CPT | Mod: ,,, | Performed by: INTERNAL MEDICINE

## 2022-09-06 PROCEDURE — 97110 THERAPEUTIC EXERCISES: CPT | Mod: CO

## 2022-09-06 PROCEDURE — 99233 PR SUBSEQUENT HOSPITAL CARE,LEVL III: ICD-10-PCS | Mod: ,,, | Performed by: INTERNAL MEDICINE

## 2022-09-06 PROCEDURE — 94761 N-INVAS EAR/PLS OXIMETRY MLT: CPT

## 2022-09-06 PROCEDURE — 99900035 HC TECH TIME PER 15 MIN (STAT)

## 2022-09-06 PROCEDURE — 25000003 PHARM REV CODE 250: Performed by: INTERNAL MEDICINE

## 2022-09-06 PROCEDURE — 27000221 HC OXYGEN, UP TO 24 HOURS

## 2022-09-06 PROCEDURE — 27000934

## 2022-09-06 PROCEDURE — 63600175 PHARM REV CODE 636 W HCPCS: Performed by: FAMILY MEDICINE

## 2022-09-06 PROCEDURE — 25000003 PHARM REV CODE 250: Performed by: FAMILY MEDICINE

## 2022-09-06 PROCEDURE — 11000008

## 2022-09-06 PROCEDURE — 27000952

## 2022-09-06 PROCEDURE — 27000940

## 2022-09-06 PROCEDURE — 25000003 PHARM REV CODE 250: Performed by: NURSE PRACTITIONER

## 2022-09-06 PROCEDURE — 94660 CPAP INITIATION&MGMT: CPT

## 2022-09-06 PROCEDURE — 82962 GLUCOSE BLOOD TEST: CPT

## 2022-09-06 RX ADMIN — Medication 3 MG: at 09:09

## 2022-09-06 RX ADMIN — MIDODRINE HYDROCHLORIDE 10 MG: 5 TABLET ORAL at 09:09

## 2022-09-06 RX ADMIN — HYDROCORTISONE ACETATE 25 MG: 25 SUPPOSITORY RECTAL at 09:09

## 2022-09-06 RX ADMIN — LEVOTHYROXINE SODIUM 100 MCG: 100 TABLET ORAL at 06:09

## 2022-09-06 RX ADMIN — FOLIC ACID 1000 MCG: 1 TABLET ORAL at 06:09

## 2022-09-06 RX ADMIN — GABAPENTIN 200 MG: 100 CAPSULE ORAL at 09:09

## 2022-09-06 RX ADMIN — ALLOPURINOL 100 MG: 100 TABLET ORAL at 09:09

## 2022-09-06 RX ADMIN — APIXABAN 2.5 MG: 2.5 TABLET, FILM COATED ORAL at 09:09

## 2022-09-06 RX ADMIN — MIDODRINE HYDROCHLORIDE 10 MG: 5 TABLET ORAL at 03:09

## 2022-09-06 RX ADMIN — Medication 5000 UNITS: at 09:09

## 2022-09-06 RX ADMIN — ATORVASTATIN CALCIUM 40 MG: 40 TABLET, FILM COATED ORAL at 09:09

## 2022-09-06 RX ADMIN — MEGESTROL ACETATE 40 MG: 40 TABLET ORAL at 06:09

## 2022-09-06 RX ADMIN — ESCITALOPRAM OXALATE 10 MG: 10 TABLET ORAL at 09:09

## 2022-09-06 RX ADMIN — PANTOPRAZOLE SODIUM 40 MG: 40 TABLET, DELAYED RELEASE ORAL at 09:09

## 2022-09-06 RX ADMIN — ASPIRIN 81 MG: 81 TABLET, COATED ORAL at 09:09

## 2022-09-06 RX ADMIN — INSULIN ASPART 1 UNITS: 100 INJECTION, SOLUTION INTRAVENOUS; SUBCUTANEOUS at 09:09

## 2022-09-06 RX ADMIN — HYDROCODONE BITARTRATE AND ACETAMINOPHEN 1 TABLET: 10; 325 TABLET ORAL at 09:09

## 2022-09-06 RX ADMIN — HYDROCODONE BITARTRATE AND ACETAMINOPHEN 1 TABLET: 10; 325 TABLET ORAL at 10:09

## 2022-09-06 NOTE — SUBJECTIVE & OBJECTIVE
Interval History: No acute events overnight. The patient is currently resting comfortably this am on nasal cannula. She is alert and oriented this am but did have some confusion earlier. She is afebrile and vital signs are stable.    Objective:     Vital Signs (Most Recent):  Temp: 98.2 °F (36.8 °C) (09/06/22 0400)  Pulse: 97 (09/06/22 0645)  Resp: 10 (09/06/22 0645)  BP: (!) 76/34 (09/06/22 0645)  SpO2: 100 % (09/06/22 0645)   Vital Signs (24h Range):  Temp:  [97.5 °F (36.4 °C)-98.2 °F (36.8 °C)] 98.2 °F (36.8 °C)  Pulse:  [] 97  Resp:  [8-24] 10  SpO2:  [21 %-100 %] 100 %  BP: ()/() 76/34     Weight: 129.8 kg (286 lb 2.5 oz)  Body mass index is 50.69 kg/m².      Intake/Output Summary (Last 24 hours) at 9/6/2022 0757  Last data filed at 9/6/2022 0600  Gross per 24 hour   Intake 1100 ml   Output 2500 ml   Net -1400 ml         Physical Exam  Vitals and nursing note reviewed.   Constitutional:       General: She is not in acute distress.     Appearance: She is obese. She is not ill-appearing.   HENT:      Head: Normocephalic and atraumatic.      Right Ear: External ear normal.      Left Ear: External ear normal.      Mouth/Throat:      Mouth: Mucous membranes are moist.      Pharynx: Oropharynx is clear.   Eyes:      Extraocular Movements: Extraocular movements intact.      Conjunctiva/sclera: Conjunctivae normal.      Pupils: Pupils are equal, round, and reactive to light.   Cardiovascular:      Rate and Rhythm: Regular rhythm. Tachycardia present.      Heart sounds: Normal heart sounds.   Pulmonary:      Effort: No respiratory distress.      Breath sounds: Normal breath sounds. No wheezing or rales.   Abdominal:      General: Bowel sounds are normal.      Palpations: Abdomen is soft.   Musculoskeletal:         General: Normal range of motion.      Cervical back: Normal range of motion.      Comments: Left amputation    Skin:     General: Skin is warm.      Coloration: Skin is not pale.       Comments: Sacral, right heel, amputation site with wounds   Neurological:      General: No focal deficit present.      Mental Status: She is alert and oriented to person, place, and time. Mental status is at baseline.   Psychiatric:         Behavior: Behavior normal.       Vents:  Oxygen Concentration (%): 40 (09/06/22 0500)    Lines/Drains/Airways       Central Venous Catheter Line  Duration                  Hemodialysis Catheter left subclavian -- days              Peripheral Intravenous Line  Duration                  Peripheral IV - Single Lumen 08/28/22 1000 22 G Posterior;Right Wrist 8 days                    Significant Labs:    CBC/Anemia Profile:  Recent Labs   Lab 09/05/22  1012   WBC 13.81*   HGB 8.8*   HCT 29.2*      .3*   RDW 18.1*        Chemistries:  Recent Labs   Lab 09/05/22  1012      K 3.8      CO2 28   BUN 25*   CREATININE 4.83*   CALCIUM 9.1         All pertinent labs within the past 24 hours have been reviewed.    Significant Imaging:  I have reviewed all pertinent imaging results/findings within the past 24 hours.

## 2022-09-06 NOTE — PT/OT/SLP PROGRESS
Occupational Therapy   Treatment    Name: Michelle Nunes  MRN: [id number]  Admitting Diagnosis:  Infection of amputation stump       Recommendations:     Discharge Recommendations: home with home health  Discharge Equipment Recommendations:  none  Barriers to discharge:       Assessment:     Michelle Nunes is a 66 y.o. female with a medical diagnosis of Infection of amputation stump.  . Performance deficits affecting function are weakness, impaired endurance, impaired self care skills, impaired cognition.     Rehab Prognosis:  Fair; patient would benefit from acute skilled OT services to address these deficits and reach maximum level of function.       Plan:     Patient to be seen 5 x/week to address the above listed problems via self-care/home management, therapeutic activities, therapeutic exercises  Plan of Care Expires: 09/05/22  Plan of Care Reviewed with: patient    Subjective     Pain/Comfort:  Pain Rating 1: 0/10    Objective:     Communicated with: JAY Gaines  prior to session.  Patient found HOB elevated with pulse ox (continuous), telemetry, peripheral IV, blood pressure cuff, oxygen upon OT entry to room.    General Precautions: Standard, fall, respiratory   Orthopedic Precautions:N/A   Braces: N/A  Respiratory Status: Nasal cannula, flow 4 L/min     Occupational Performance:     Bed Mobility:         Functional Mobility/Transfers:    Functional Mobility:   Activities of Daily Living:        [facility] 6 Click ADL:      Treatment & Education:  Pt performed hand helper with 1 rubber band resistance 10 reps, aarom 10 reps x 2 B shld flex,abd/add,elbow flex/ext,wrist flex/ext, Pt required numerous v/c's to remain awake     Patient left HOB elevated with all lines intact    GOALS:   Multidisciplinary Problems       Occupational Therapy Goals          Problem: Occupational Therapy    Goal Priority Disciplines Outcome Interventions   Occupational Therapy Goal     OT, PT/OT Ongoing, Progressing     Description: STG:  Pt will perform grooming with setup  Pt will bathe with setup and min(A) for upper body anterior  Pt will perform UE dressing with setup  Pt will perform self- feeding with setup  Pt will perform HEP independently  Pt will tolerate 20 minutes of tx without fatigue      LT.Restore to max I with self care and mobility.                          Time Tracking:     OT Date of Treatment: 22  OT Start Time: 1525  OT Stop Time: 1540  OT Total Time (min): 15 min    Billable Minutes:Therapeutic Exercise 14    OT/TARYN: TARYN          2022

## 2022-09-06 NOTE — PROGRESS NOTES
[facility]  Nephrology  Progress Note    Patient Name: Michelle Nunes  MRN: [id number]  Admission Date: 8/27/2022  Hospital Length of Stay: 10 days  Attending Provider: Donis Shi DO   Primary Care Physician: Primary Doctor No  Principal Problem:Infection of amputation stump    Subjective:     HPI: This patient is known to this nephrology service from a previous consult.  She has a history of ESRD due to HTN and DM.  She has a history of PVD and is s/p left AKA.  The patient presented with a wound on the right foot.  She has required broad spectrum antibiotics.  She has been transferred to Specialty for further care.  She is on pressor medications.      Interval History: The patient is resting.  No acute changes.  Blood pressure appears to be better.    Review of patient's allergies indicates:   Allergen Reactions    Milk containing products     Pcn [penicillins]      Current Facility-Administered Medications   Medication Frequency    0.9%  NaCl infusion PRN    acetaminophen tablet 650 mg Q4H PRN    allopurinoL tablet 100 mg Daily    apixaban tablet 2.5 mg BID    aspirin EC tablet 81 mg Daily    atorvastatin tablet 40 mg QHS    bisacodyL EC tablet 10 mg Daily PRN    collagenase ointment Daily PRN    dextromethorphan-guaiFENesin  mg/5 ml liquid 10 mL Q6H PRN    dextrose 50% injection 12.5 g PRN    dextrose 50% injection 25 g PRN    EScitalopram oxalate tablet 10 mg QHS    folic acid tablet 1,000 mcg QAM    gabapentin capsule 200 mg QHS    glucagon (human recombinant) injection 1 mg PRN    heparin (porcine) injection 4,000 Units PRN    HYDROcodone-acetaminophen  mg per tablet 1 tablet Q8H PRN    hydrocortisone suppository 25 mg BID    hydrOXYzine pamoate capsule 25 mg Q6H PRN    insulin aspart U-100 injection 0-5 Units QID (AC + HS) PRN    levothyroxine tablet 100 mcg QAM    megestroL tablet 40 mg QAM    melatonin tablet 3 mg QHS     midodrine tablet 10 mg TID    NORepinephrine 8 mg in dextrose 5 % 250 mL infusion Continuous    ondansetron injection 4 mg Q8H PRN    pantoprazole EC tablet 40 mg Daily    simethicone chewable tablet 80 mg TID PRN    sodium chloride 0.9% flush 10 mL Q12H PRN    traZODone tablet 50 mg Nightly PRN    vitamin D 1000 units tablet 5,000 Units Daily       Objective:     Vital Signs (Most Recent):  Temp: 97.9 °F (36.6 °C) (09/06/22 1500)  Pulse: 83 (09/06/22 1830)  Resp: (!) 22 (09/06/22 1830)  BP: (!) 132/48 (09/06/22 1830)  SpO2: 100 % (09/06/22 1830)  O2 Device (Oxygen Therapy): nasal cannula (09/06/22 1315)   Vital Signs (24h Range):  Temp:  [97.2 °F (36.2 °C)-98.2 °F (36.8 °C)] 97.9 °F (36.6 °C)  Pulse:  [] 83  Resp:  [8-24] 22  SpO2:  [79 %-100 %] 100 %  BP: ()/() 132/48     Weight: 129.8 kg (286 lb 2.5 oz) (09/06/22 0000)  Body mass index is 50.69 kg/m².  Body surface area is 2.4 meters squared.    I/O last 3 completed shifts:  In: 1100 [P.O.:600; Other:500]  Out: 2500 [Other:2500]    Physical Exam  Vitals reviewed.   Constitutional:       Appearance: She is obese.   HENT:      Head: Normocephalic.   Cardiovascular:      Rate and Rhythm: Regular rhythm.   Pulmonary:      Effort: Pulmonary effort is normal.   Abdominal:      Palpations: Abdomen is soft.   Musculoskeletal:         General: Swelling present.   Skin:     General: Skin is warm.   Neurological:      Mental Status: She is alert.       Significant Labs:  BMP:   Recent Labs   Lab 09/03/22  1834 09/05/22  1012   GLU 89 161*   * 137   K 4.8 3.8    101   CO2 22 28   BUN 24* 25*   CREATININE 5.45* 4.83*   CALCIUM 8.7 9.1   MG 1.8  --      CBC:   Recent Labs   Lab 09/05/22  1012   WBC 13.81*   RBC 2.91*   HGB 8.8*   HCT 29.2*      .3*   MCH 30.2   MCHC 30.1*        Significant Imaging:  Labs: Reviewed    Assessment/Plan:     ESRD on dialysis  Dialysis TTS  Continue with scheduled hemodialysis.    Diabetic ulcer  of right heel associated with type 2 diabetes mellitus  Broad spectrum antitibiotics.        Thank you for your consult. I will follow-up with patient. Please contact us if you have any additional questions.    Jef Romero Jr, MD  Nephrology  [facility]

## 2022-09-06 NOTE — ASSESSMENT & PLAN NOTE
BP very labile -- seems to get worse after dialysis - started on Levophed and transferred to [facility] last night  - midodrine increased yesterday    8/29 BP looks ok this am  Now hypertensive at times  8/31- she remains on pressor. Weaning as able. MAP goal of 60  9/1- likely not getting accurate blood pressures as she has wide fluctuations in her pressures. She remains on some levophed  9/2- wean levophed as able; BP with large fluctuations in readings- questionable accuracy   9/3- will hold levo as BP is improved today with smaller fluctuations in readings; maintain MAP >60   9/4- BP remains stable with levophed infusion off   9/6- levophed remains off; BP stable

## 2022-09-06 NOTE — DIALYSIS ROUNDING
The patient was dialyzed today with a net fluid of 2 liters.  PE  Lungs-clear  Cor-no murmur   Abd-soft    Plan:  Continue the present care

## 2022-09-06 NOTE — SUBJECTIVE & OBJECTIVE
Interval History: The patient is resting.  No acute changes.  Blood pressure appears to be better.    Review of patient's allergies indicates:   Allergen Reactions    Milk containing products     Pcn [penicillins]      Current Facility-Administered Medications   Medication Frequency    0.9%  NaCl infusion PRN    acetaminophen tablet 650 mg Q4H PRN    allopurinoL tablet 100 mg Daily    apixaban tablet 2.5 mg BID    aspirin EC tablet 81 mg Daily    atorvastatin tablet 40 mg QHS    bisacodyL EC tablet 10 mg Daily PRN    collagenase ointment Daily PRN    dextromethorphan-guaiFENesin  mg/5 ml liquid 10 mL Q6H PRN    dextrose 50% injection 12.5 g PRN    dextrose 50% injection 25 g PRN    EScitalopram oxalate tablet 10 mg QHS    folic acid tablet 1,000 mcg QAM    gabapentin capsule 200 mg QHS    glucagon (human recombinant) injection 1 mg PRN    heparin (porcine) injection 4,000 Units PRN    HYDROcodone-acetaminophen  mg per tablet 1 tablet Q8H PRN    hydrocortisone suppository 25 mg BID    hydrOXYzine pamoate capsule 25 mg Q6H PRN    insulin aspart U-100 injection 0-5 Units QID (AC + HS) PRN    levothyroxine tablet 100 mcg QAM    megestroL tablet 40 mg QAM    melatonin tablet 3 mg QHS    midodrine tablet 10 mg TID    NORepinephrine 8 mg in dextrose 5 % 250 mL infusion Continuous    ondansetron injection 4 mg Q8H PRN    pantoprazole EC tablet 40 mg Daily    simethicone chewable tablet 80 mg TID PRN    sodium chloride 0.9% flush 10 mL Q12H PRN    traZODone tablet 50 mg Nightly PRN    vitamin D 1000 units tablet 5,000 Units Daily       Objective:     Vital Signs (Most Recent):  Temp: 97.9 °F (36.6 °C) (09/06/22 1500)  Pulse: 83 (09/06/22 1830)  Resp: (!) 22 (09/06/22 1830)  BP: (!) 132/48 (09/06/22 1830)  SpO2: 100 % (09/06/22 1830)  O2 Device (Oxygen Therapy): nasal cannula (09/06/22 1315)   Vital Signs (24h Range):  Temp:  [97.2 °F (36.2 °C)-98.2 °F (36.8 °C)] 97.9 °F (36.6 °C)  Pulse:  [] 83  Resp:   [8-24] 22  SpO2:  [79 %-100 %] 100 %  BP: ()/() 132/48     Weight: 129.8 kg (286 lb 2.5 oz) (09/06/22 0000)  Body mass index is 50.69 kg/m².  Body surface area is 2.4 meters squared.    I/O last 3 completed shifts:  In: 1100 [P.O.:600; Other:500]  Out: 2500 [Other:2500]    Physical Exam  Vitals reviewed.   Constitutional:       Appearance: She is obese.   HENT:      Head: Normocephalic.   Cardiovascular:      Rate and Rhythm: Regular rhythm.   Pulmonary:      Effort: Pulmonary effort is normal.   Abdominal:      Palpations: Abdomen is soft.   Musculoskeletal:         General: Swelling present.   Skin:     General: Skin is warm.   Neurological:      Mental Status: She is alert.       Significant Labs:  BMP:   Recent Labs   Lab 09/03/22  1834 09/05/22  1012   GLU 89 161*   * 137   K 4.8 3.8    101   CO2 22 28   BUN 24* 25*   CREATININE 5.45* 4.83*   CALCIUM 8.7 9.1   MG 1.8  --      CBC:   Recent Labs   Lab 09/05/22  1012   WBC 13.81*   RBC 2.91*   HGB 8.8*   HCT 29.2*      .3*   MCH 30.2   MCHC 30.1*        Significant Imaging:  Labs: Reviewed

## 2022-09-06 NOTE — ASSESSMENT & PLAN NOTE
Glucose <180  Continue with current care  She is on 10 units of levemir and mild sliding scale  9/2 glucose controlled with current treatment  9/4  - levemir has been decreased to 5u as BG trend had been less than 180  - BG trend <120 at present   - will d/c levemir and cover with SSI if needed   9/6  - BG trend less than 200

## 2022-09-06 NOTE — ASSESSMENT & PLAN NOTE
Hx ROXANN, [facility] -- needs to use bipap every night   Pt placed on Bipap this morning --- ABGs returned - 7.27/62/150   - will change settings and repeat labs in 1 hour   8/29 Awake and alert this am. Placing on nasal cannula during the day and bipap at night  On nasal cannula this am. Slept with the bipap overnight. Continue with current care  8/31- Did not wear bipap overnight and now she is confused. I am going to check an ABG. Place back on bipap. She had an episode of emesis last night and they held the bipap. We are not going to be able to hold the bipap overnight or she is going to have issues with CO2 retention  9/1- better this am. We are going to place back on nasal cannula this am. She will continue with bipap at night.  9/6- NC at present; continue bipap overnight

## 2022-09-06 NOTE — PROGRESS NOTES
[facility]  Adult Nutrition  First Assessment Note         Reason for Assessment  Reason For Assessment: length of stay   Nutrition Risk Screen: large or nonhealing wound, burn or pressure injury     Patient seen for LOS. She is 286#. Her weight is down 20# since admission. She is ESRD and receives dialysis. Weight fluctuations expected. She has a left AKA and IBW has been adjusted. She is on a renal diet. Suggest adding diabetic diet due to elevated blood glucose. Her intake is good at breakfast and 50% lunch and supper meals. Suggest adding Nepro BID and Matteo BID to provide extra nutrition needed for wound healing and dialysis. RD following.  Malnutrition  Is Patient Malnourished: No  Skin Integrity  Jarrett Risk Assessment  Sensory Perception: 2-->very limited  Moisture: 3-->occasionally moist  Activity: 1-->bedfast  Mobility: 2-->very limited  Nutrition: 2-->probably inadequate  Friction and Shear: 2-->potential problem  Jarrett Score: 12  Comments on skin integrity: breakdown noted  Nutrition Diagnosis  Increased protein Needs   related to Renal dysfunction and Wound healing as evidenced by ESRD with wounds    Nutrition Diagnosis Status: Chronic/ continues      Comments: supplements needed  Nutrition Risk  Level of Risk/Frequency of Follow-up: moderate - high  Comments on nutrition risk: fair intake, dialysis, wounds   Recent Labs   Lab 09/05/22  1012 09/05/22  1306 09/06/22  1131   *  --   --    POCGLU  --    < > 182*    < > = values in this interval not displayed.     Comments on Glucose: elevated due to diabetes  Nutrition Prescription / Recommendations  Recommendation/Intervention: Recommend adding diabetic diet to diet order. Suggest adding Matteo and  Nepro BID. Increased protein needed for wound healing.  Goals: weight miantenance, wound helaing and % of meals  Nutrition Goal Status: new  Communication of RD Recs: reviewed with RN  Current Diet Order: Renal  diet  Chewing or Swallowing Difficulty?: No Chewing or swallowing difficulty  Recommended Diet: Consistent Carbohydrate 1800 (60g Carbs) and Renal (High Protein)  Recommended Oral Supplement: Matteo [90 kcals, 2.5g Protein, 10g Carbs(3g Sugar), 7g L-Arginine, 7g L-Glutamine, Vitamin C 300mg, 9.5mg Zinc] twice daily and Nepro [420 kcals, 19g Protein, 38g Carbs(6g Fiber, 8g Sugar), 23g Fat] twice daily  Is Nutrition Support Recommended: No  Is Education Recommended: No  Monitor and Evaluation  % current Intake: P.O. intake of 50 - 75 %  % intake to meet estimated needs: P.O. + Supplements  Food and Nutrient Intake: energy intake  Anthropometric Measurements: weight, weight change, body mass index  Biochemical Data, Medical Tests and Procedures: lipid profile, gastrointestinal profile, electrolyte and renal panel, glucose/endocrine profile, inflammatory profile  Nutrition-Focused Physical Findings: overall appearance, skin  Energy Calories Required: not meeting needs  Protein Required: not meeting needs  Tolerance: tolerating  Current Medical Diagnosis and Past Medical History  Diagnosis: renal disease, infection/sepsis, diabetes diagnosis/complications  Past Medical History:   Diagnosis Date    A-fib     Blind right eye     Diabetes mellitus     ESRD (end stage renal disease) on dialysis     GERD (gastroesophageal reflux disease)     Gout, unspecified     Heart attack     HTN (hypertension)     Kidney failure     Neuropathy      Nutrition/Diet History  Spiritual, Cultural Beliefs, Yazdanism Practices, Values that Affect Care: no  Factors Affecting Nutritional Intake: None identified at this time  Lab/Procedures/Meds  Recent Labs   Lab 09/03/22  1834 09/05/22  1012   * 137   K 4.8 3.8   BUN 24* 25*   CREATININE 5.45* 4.83*   CALCIUM 8.7 9.1   ALBUMIN 1.7*  --     101   ALT 13  --    AST 24  --    PHOS 3.2  --      Last A1c:   Lab Results   Component Value Date    HGBA1C 5.3 08/25/2022     Lab  "Results   Component Value Date    RBC 2.91 (L) 09/05/2022    HGB 8.8 (L) 09/05/2022    HCT 29.2 (L) 09/05/2022    .3 (H) 09/05/2022    MCH 30.2 09/05/2022    MCHC 30.1 (L) 09/05/2022     Pertinent Labs Reviewed: reviewed  Pertinent Labs Comments: 09/05/22 10:12  Sodium: 137  Potassium: 3.8  Chloride: 101  CO2: 28  Anion Gap: 12  BUN: 25 (H)  Creatinine: 4.83 (H)  BUN/CREAT RATIO: 5 (L)  eGFR: 9 (L)  Glucose: 161 (H)  Calcium: 9.1      (H): Data is abnormally high  (L): Data is abnormally low  Pertinent Medications Reviewed: reviewed  Pertinent Medications Comments: allopurinol, apixaban, atrovastatin, lexapro, folic acid, levothyroxine  Anthropometrics  Temp: 97.9 °F (36.6 °C)  Height Method: Stated  Height: 5' 3" (160 cm)  Height (inches): 63 in  Weight Method: Bed Scale  Weight: 129.8 kg (286 lb 2.5 oz)  Weight (lb): 286.16 lb  Ideal Body Weight (IBW), Female: 115 lb  % Ideal Body Weight, Female (lb): 266.64 %  BMI (Calculated): 50.7  BMI Grade: greater than 40 - morbid obesity  Amputation %: 16  Total Amputation %: 16     Estimated/Assessed Needs      Temp: 97.9 °F (36.6 °C)Axillary  Weight Used For Calorie Calculations: 64 kg (141 lb 1.5 oz) (adjusted weight for obesity and amputation)   Energy Need Method: Kcal/kg Energy Calorie Requirements (kcal): 1303-8501  Weight Used For Protein Calculations: 64 kg (141 lb 1.5 oz) (adjusted for obesity and amputation)  Protein Requirements: 77-96g (1.2-1.5g pro/kg)  Estimated Fluid Requirement Method: RDA Method    RDA Method (mL): 1600     Nutrition by Nursing  Diet/Nutrition Received: other (see comments) (RENAL DIET)  Intake (%): 50%  Diet/Feeding Assistance: total feed  Diet/Feeding Tolerance: poor  Last Bowel Movement: (P) 09/05/22 (pt has small bm on 9/5/22)              Nutrition Follow-Up  RD Follow-up?: Yes                  "

## 2022-09-06 NOTE — PROGRESS NOTES
[facility]  Pulmonology  Progress Note    Patient Name: Michelle Nunes  MRN: [id number]  Admission Date: 8/27/2022  Hospital Length of Stay: 10 days  Code Status: Full Code  Attending Provider: Donis Shi DO  Primary Care Provider: Primary Doctor No   Principal Problem: Infection of amputation stump    Subjective:     Interval History: No acute events overnight. The patient is currently resting comfortably this am on nasal cannula. She is alert and oriented this am but did have some confusion earlier. She is afebrile and vital signs are stable.    Objective:     Vital Signs (Most Recent):  Temp: 98.2 °F (36.8 °C) (09/06/22 0400)  Pulse: 97 (09/06/22 0645)  Resp: 10 (09/06/22 0645)  BP: (!) 76/34 (09/06/22 0645)  SpO2: 100 % (09/06/22 0645)   Vital Signs (24h Range):  Temp:  [97.5 °F (36.4 °C)-98.2 °F (36.8 °C)] 98.2 °F (36.8 °C)  Pulse:  [] 97  Resp:  [8-24] 10  SpO2:  [21 %-100 %] 100 %  BP: ()/() 76/34     Weight: 129.8 kg (286 lb 2.5 oz)  Body mass index is 50.69 kg/m².      Intake/Output Summary (Last 24 hours) at 9/6/2022 0757  Last data filed at 9/6/2022 0600  Gross per 24 hour   Intake 1100 ml   Output 2500 ml   Net -1400 ml         Physical Exam  Vitals and nursing note reviewed.   Constitutional:       General: She is not in acute distress.     Appearance: She is obese. She is not ill-appearing.   HENT:      Head: Normocephalic and atraumatic.      Right Ear: External ear normal.      Left Ear: External ear normal.      Mouth/Throat:      Mouth: Mucous membranes are moist.      Pharynx: Oropharynx is clear.   Eyes:      Extraocular Movements: Extraocular movements intact.      Conjunctiva/sclera: Conjunctivae normal.      Pupils: Pupils are equal, round, and reactive to light.   Cardiovascular:      Rate and Rhythm: Regular rhythm. Tachycardia present.      Heart sounds: Normal heart sounds.   Pulmonary:      Effort: No respiratory distress.       Breath sounds: Normal breath sounds. No wheezing or rales.   Abdominal:      General: Bowel sounds are normal.      Palpations: Abdomen is soft.   Musculoskeletal:         General: Normal range of motion.      Cervical back: Normal range of motion.      Comments: Left amputation    Skin:     General: Skin is warm.      Coloration: Skin is not pale.      Comments: Sacral, right heel, amputation site with wounds   Neurological:      General: No focal deficit present.      Mental Status: She is alert and oriented to person, place, and time. Mental status is at baseline.   Psychiatric:         Behavior: Behavior normal.       Vents:  Oxygen Concentration (%): 40 (09/06/22 0500)    Lines/Drains/Airways       Central Venous Catheter Line  Duration                  Hemodialysis Catheter left subclavian -- days              Peripheral Intravenous Line  Duration                  Peripheral IV - Single Lumen 08/28/22 1000 22 G Posterior;Right Wrist 8 days                    Significant Labs:    CBC/Anemia Profile:  Recent Labs   Lab 09/05/22  1012   WBC 13.81*   HGB 8.8*   HCT 29.2*      .3*   RDW 18.1*        Chemistries:  Recent Labs   Lab 09/05/22  1012      K 3.8      CO2 28   BUN 25*   CREATININE 4.83*   CALCIUM 9.1         All pertinent labs within the past 24 hours have been reviewed.    Significant Imaging:  I have reviewed all pertinent imaging results/findings within the past 24 hours.    Assessment/Plan:     * Infection of amputation stump  Wound care - cultures reviewed - sensitive to Azactam   9/2- has completed abx     Morbid obesity with BMI of 50.0-59.9, adult  Complicates all levels of care    Acute hypercapnic respiratory failure  Hx ROXANN, [facility] -- needs to use bipap every night   Pt placed on Bipap this morning --- ABGs returned - 7.27/62/150   - will change settings and repeat labs in 1 hour   8/29 Awake and alert this am. Placing on nasal cannula during the day and bipap at  night  On nasal cannula this am. Slept with the bipap overnight. Continue with current care  8/31- Did not wear bipap overnight and now she is confused. I am going to check an ABG. Place back on bipap. She had an episode of emesis last night and they held the bipap. We are not going to be able to hold the bipap overnight or she is going to have issues with CO2 retention  9/1- better this am. We are going to place back on nasal cannula this am. She will continue with bipap at night.  9/6- NC at present; continue bipap overnight     Hypotension  BP very labile -- seems to get worse after dialysis - started on Levophed and transferred to hospitals last night  - midodrine increased yesterday    8/29 BP looks ok this am  Now hypertensive at times  8/31- she remains on pressor. Weaning as able. MAP goal of 60  9/1- likely not getting accurate blood pressures as she has wide fluctuations in her pressures. She remains on some levophed  9/2- wean levophed as able; BP with large fluctuations in readings- questionable accuracy   9/3- will hold levo as BP is improved today with smaller fluctuations in readings; maintain MAP >60   9/4- BP remains stable with levophed infusion off   9/6- levophed remains off; BP stable    Atrial fibrillation, controlled  Rate controlled- continue eliquis    Pressure injury of buttock, unstageable  Continue with wound care    Type 2 diabetes mellitus  Glucose <180  Continue with current care  She is on 10 units of levemir and mild sliding scale  9/2 glucose controlled with current treatment  9/4  - levemir has been decreased to 5u as BG trend had been less than 180  - BG trend <120 at present   - will d/c levemir and cover with SSI if needed   9/6  - BG trend less than 200        ESRD on dialysis  Continue with dialysis per nephrology  T/TH/ Sat  9/2- MWF HD     Deep vein thrombosis (DVT) of lower extremity  Dopplers negative here   She is on eliquis 2.5 bid    Diabetic ulcer of right heel associated  with type 2 diabetes mellitus  Continue with wound care                  Donis Shi, DO  Pulmonology  [facility]

## 2022-09-06 NOTE — PLAN OF CARE
Problem: Occupational Therapy  Goal: Occupational Therapy Goal  Description: STG:  Pt will perform grooming with setup progressing   Pt will bathe with setup and min(A) for upper body anterior nt   Pt will perform UE dressing with setup nt   Pt will perform self- feeding with setup nt   Pt will perform HEP independently progressing  Pt will tolerate 20 minutes of tx without fatigue progressing      LT.Restore to max I with self care and mobility.     Outcome: Ongoing, Progressing   Set up with v/c's  to wash her face    Pt has made some progress with therapy. Plan is to cont with OT poc

## 2022-09-07 LAB
GLUCOSE SERPL-MCNC: 181 MG/DL (ref 70–105)
GLUCOSE SERPL-MCNC: 191 MG/DL (ref 70–105)
GLUCOSE SERPL-MCNC: 203 MG/DL (ref 70–105)
GLUCOSE SERPL-MCNC: 233 MG/DL (ref 70–105)

## 2022-09-07 PROCEDURE — 96372 THER/PROPH/DIAG INJ SC/IM: CPT

## 2022-09-07 PROCEDURE — 25000003 PHARM REV CODE 250: Performed by: FAMILY MEDICINE

## 2022-09-07 PROCEDURE — 63600175 PHARM REV CODE 636 W HCPCS: Performed by: FAMILY MEDICINE

## 2022-09-07 PROCEDURE — 99900035 HC TECH TIME PER 15 MIN (STAT)

## 2022-09-07 PROCEDURE — 11000008

## 2022-09-07 PROCEDURE — 27000221 HC OXYGEN, UP TO 24 HOURS

## 2022-09-07 PROCEDURE — 97110 THERAPEUTIC EXERCISES: CPT | Mod: CO

## 2022-09-07 PROCEDURE — 82962 GLUCOSE BLOOD TEST: CPT

## 2022-09-07 PROCEDURE — 99233 SBSQ HOSP IP/OBS HIGH 50: CPT | Mod: ,,, | Performed by: INTERNAL MEDICINE

## 2022-09-07 PROCEDURE — 94660 CPAP INITIATION&MGMT: CPT

## 2022-09-07 PROCEDURE — 27000952

## 2022-09-07 PROCEDURE — 99233 PR SUBSEQUENT HOSPITAL CARE,LEVL III: ICD-10-PCS | Mod: ,,, | Performed by: INTERNAL MEDICINE

## 2022-09-07 PROCEDURE — 27000940

## 2022-09-07 PROCEDURE — 80100014 HC HEMODIALYSIS 1:1

## 2022-09-07 PROCEDURE — 25000003 PHARM REV CODE 250: Performed by: INTERNAL MEDICINE

## 2022-09-07 PROCEDURE — 25000003 PHARM REV CODE 250: Performed by: NURSE PRACTITIONER

## 2022-09-07 PROCEDURE — 27000934

## 2022-09-07 RX ADMIN — ESCITALOPRAM OXALATE 10 MG: 10 TABLET ORAL at 08:09

## 2022-09-07 RX ADMIN — INSULIN ASPART 2 UNITS: 100 INJECTION, SOLUTION INTRAVENOUS; SUBCUTANEOUS at 06:09

## 2022-09-07 RX ADMIN — MEGESTROL ACETATE 40 MG: 40 TABLET ORAL at 06:09

## 2022-09-07 RX ADMIN — HEPARIN SODIUM 4000 UNITS: 1000 INJECTION INTRAVENOUS; SUBCUTANEOUS at 09:09

## 2022-09-07 RX ADMIN — PANTOPRAZOLE SODIUM 40 MG: 40 TABLET, DELAYED RELEASE ORAL at 08:09

## 2022-09-07 RX ADMIN — MIDODRINE HYDROCHLORIDE 10 MG: 5 TABLET ORAL at 08:09

## 2022-09-07 RX ADMIN — HYDROCORTISONE ACETATE 25 MG: 25 SUPPOSITORY RECTAL at 08:09

## 2022-09-07 RX ADMIN — APIXABAN 2.5 MG: 2.5 TABLET, FILM COATED ORAL at 08:09

## 2022-09-07 RX ADMIN — Medication 3 MG: at 08:09

## 2022-09-07 RX ADMIN — LEVOTHYROXINE SODIUM 100 MCG: 100 TABLET ORAL at 06:09

## 2022-09-07 RX ADMIN — ASPIRIN 81 MG: 81 TABLET, COATED ORAL at 08:09

## 2022-09-07 RX ADMIN — Medication 5000 UNITS: at 08:09

## 2022-09-07 RX ADMIN — HYDROCODONE BITARTRATE AND ACETAMINOPHEN 1 TABLET: 10; 325 TABLET ORAL at 12:09

## 2022-09-07 RX ADMIN — FOLIC ACID 1000 MCG: 1 TABLET ORAL at 06:09

## 2022-09-07 RX ADMIN — MIDODRINE HYDROCHLORIDE 10 MG: 5 TABLET ORAL at 03:09

## 2022-09-07 RX ADMIN — ALLOPURINOL 100 MG: 100 TABLET ORAL at 08:09

## 2022-09-07 RX ADMIN — INSULIN ASPART 2 UNITS: 100 INJECTION, SOLUTION INTRAVENOUS; SUBCUTANEOUS at 12:09

## 2022-09-07 RX ADMIN — GABAPENTIN 200 MG: 100 CAPSULE ORAL at 08:09

## 2022-09-07 RX ADMIN — SODIUM CHLORIDE: 9 INJECTION, SOLUTION INTRAVENOUS at 09:09

## 2022-09-07 RX ADMIN — ATORVASTATIN CALCIUM 40 MG: 40 TABLET, FILM COATED ORAL at 08:09

## 2022-09-07 NOTE — PROGRESS NOTES
[facility]  Nephrology  Progress Note    Patient Name: Michelle Nunes  MRN: [id number]  Admission Date: 8/27/2022  Hospital Length of Stay: 11 days  Attending Provider: Donis Shi DO   Primary Care Physician: Primary Doctor No  Principal Problem:Infection of amputation stump    Subjective:     HPI: This patient is known to this nephrology service from a previous consult.  She has a history of ESRD due to HTN and DM.  She has a history of PVD and is s/p left AKA.  The patient presented with a wound on the right foot.  She has required broad spectrum antibiotics.  She has been transferred to Specialty for further care.  She is on pressor medications.      Interval History: The patient is resting.  She is much more interactive today.  Her family is at the bedside.  Blood pressure is a little better.    Review of patient's allergies indicates:   Allergen Reactions    Milk containing products     Pcn [penicillins]      Current Facility-Administered Medications   Medication Frequency    0.9%  NaCl infusion PRN    acetaminophen tablet 650 mg Q4H PRN    allopurinoL tablet 100 mg Daily    apixaban tablet 2.5 mg BID    aspirin EC tablet 81 mg Daily    atorvastatin tablet 40 mg QHS    bisacodyL EC tablet 10 mg Daily PRN    collagenase ointment Daily PRN    dextromethorphan-guaiFENesin  mg/5 ml liquid 10 mL Q6H PRN    dextrose 50% injection 12.5 g PRN    dextrose 50% injection 25 g PRN    EScitalopram oxalate tablet 10 mg QHS    folic acid tablet 1,000 mcg QAM    gabapentin capsule 200 mg QHS    glucagon (human recombinant) injection 1 mg PRN    heparin (porcine) injection 4,000 Units PRN    HYDROcodone-acetaminophen  mg per tablet 1 tablet Q8H PRN    hydrocortisone suppository 25 mg BID    hydrOXYzine pamoate capsule 25 mg Q6H PRN    insulin aspart U-100 injection 0-5 Units QID (AC + HS) PRN    levothyroxine tablet 100 mcg QAM    megestroL tablet 40  mg QAM    melatonin tablet 3 mg QHS    midodrine tablet 10 mg TID    NORepinephrine 8 mg in dextrose 5 % 250 mL infusion Continuous    ondansetron injection 4 mg Q8H PRN    pantoprazole EC tablet 40 mg Daily    simethicone chewable tablet 80 mg TID PRN    sodium chloride 0.9% flush 10 mL Q12H PRN    traZODone tablet 50 mg Nightly PRN    vitamin D 1000 units tablet 5,000 Units Daily       Objective:     Vital Signs (Most Recent):  Temp: 97.3 °F (36.3 °C) (09/07/22 1600)  Pulse: 95 (09/07/22 1600)  Resp: 12 (09/07/22 1600)  BP: (!) 85/52 (09/07/22 1600)  SpO2: 100 % (09/07/22 1600)  O2 Device (Oxygen Therapy): nasal cannula (09/07/22 1213)   Vital Signs (24h Range):  Temp:  [97 °F (36.1 °C)-98.4 °F (36.9 °C)] 97.3 °F (36.3 °C)  Pulse:  [] 95  Resp:  [6-22] 12  SpO2:  [73 %-100 %] 100 %  BP: ()/(39-78) 85/52     Weight: 129.8 kg (286 lb 2.5 oz) (09/06/22 0000)  Body mass index is 50.69 kg/m².  Body surface area is 2.4 meters squared.    I/O last 3 completed shifts:  In: 1000 [P.O.:1000]  Out: -     Physical Exam  Vitals reviewed.   Constitutional:       Appearance: She is obese.   HENT:      Head: Normocephalic and atraumatic.      Mouth/Throat:      Mouth: Mucous membranes are moist.   Cardiovascular:      Rate and Rhythm: Regular rhythm.   Pulmonary:      Effort: Pulmonary effort is normal.   Abdominal:      Palpations: Abdomen is soft.   Skin:     General: Skin is warm.   Neurological:      Mental Status: She is alert.       Significant Labs:  BMP:   Recent Labs   Lab 09/03/22  1834 09/05/22  1012   GLU 89 161*   * 137   K 4.8 3.8    101   CO2 22 28   BUN 24* 25*   CREATININE 5.45* 4.83*   CALCIUM 8.7 9.1   MG 1.8  --      CBC:   Recent Labs   Lab 09/05/22  1012   WBC 13.81*   RBC 2.91*   HGB 8.8*   HCT 29.2*      .3*   MCH 30.2   MCHC 30.1*        Significant Imaging:  Labs: Reviewed    Assessment/Plan:     Type 2 diabetes mellitus  Chronic condition    ESRD on  dialysis  Dialysis TTS  Continue with scheduled hemodialysis.  9/7/2022  Dialysis as scheduled.    Diabetic ulcer of right heel associated with type 2 diabetes mellitus  Broad spectrum antitibiotics.  The condition is about the same. The patient is getting antibiotics        Thank you for your consult. I will follow-up with patient. Please contact us if you have any additional questions.    Jef Romero Jr, MD  Nephrology  [facility]

## 2022-09-07 NOTE — PROGRESS NOTES
[facility] [place] [facility]\""  Pulmonology  Progress Note    Patient Name: Michelle Nunes  MRN: [id number]  Admission Date: 8/27/2022  Hospital Length of Stay: 11 days  Code Status: Full Code  Attending Provider: Donis Shi DO  Primary Care Provider: Primary Doctor No   Principal Problem: Infection of amputation stump    Subjective:     Interval History: No acute events overnight. The patient is currently resting comfortably this am. She did sleep with the bipap overnight She is afebrile and vital signs are stable.    Objective:     Vital Signs (Most Recent):  Temp: 97.2 °F (36.2 °C) (09/07/22 0722)  Pulse: 92 (09/07/22 0722)  Resp: 12 (09/07/22 0722)  BP: (!) 106/53 (09/07/22 0722)  SpO2: 100 % (09/07/22 0722)   Vital Signs (24h Range):  Temp:  [97.2 °F (36.2 °C)-98.4 °F (36.9 °C)] 97.2 °F (36.2 °C)  Pulse:  [] 92  Resp:  [6-24] 12  SpO2:  [79 %-100 %] 100 %  BP: ()/() 106/53     Weight: 129.8 kg (286 lb 2.5 oz)  Body mass index is 50.69 kg/m².      Intake/Output Summary (Last 24 hours) at 9/7/2022 0818  Last data filed at 9/7/2022 0700  Gross per 24 hour   Intake 400 ml   Output --   Net 400 ml         Physical Exam  Vitals and nursing note reviewed.   Constitutional:       General: She is not in acute distress.     Appearance: She is obese. She is not ill-appearing.   HENT:      Head: Normocephalic and atraumatic.      Right Ear: External ear normal.      Left Ear: External ear normal.      Mouth/Throat:      Mouth: Mucous membranes are moist.      Pharynx: Oropharynx is clear.   Eyes:      Extraocular Movements: Extraocular movements intact.      Conjunctiva/sclera: Conjunctivae normal.      Pupils: Pupils are equal, round, and reactive to light.   Cardiovascular:      Rate and Rhythm: Regular rhythm. Tachycardia present.      Heart sounds: Normal heart sounds.   Pulmonary:      Effort: No respiratory distress.      Breath sounds: Normal breath sounds. No wheezing or  rales.   Abdominal:      General: Bowel sounds are normal.      Palpations: Abdomen is soft.   Musculoskeletal:         General: Normal range of motion.      Cervical back: Normal range of motion.      Comments: Left amputation    Skin:     General: Skin is warm.      Coloration: Skin is not pale.      Comments: Sacral, right heel, amputation site with wounds   Neurological:      General: No focal deficit present.      Mental Status: She is alert and oriented to person, place, and time. Mental status is at baseline.   Psychiatric:         Behavior: Behavior normal.       Vents:  Oxygen Concentration (%): 45 (09/06/22 2300)    Lines/Drains/Airways       Central Venous Catheter Line  Duration                  Hemodialysis Catheter left subclavian -- days              Peripheral Intravenous Line  Duration                  Peripheral IV - Single Lumen 09/06/22 0945 20 G Left;Posterior Hand <1 day                    Significant Labs:    CBC/Anemia Profile:  Recent Labs   Lab 09/05/22  1012   WBC 13.81*   HGB 8.8*   HCT 29.2*      .3*   RDW 18.1*          Chemistries:  Recent Labs   Lab 09/05/22  1012      K 3.8      CO2 28   BUN 25*   CREATININE 4.83*   CALCIUM 9.1         All pertinent labs within the past 24 hours have been reviewed.    Significant Imaging:  I have reviewed all pertinent imaging results/findings within the past 24 hours.    Assessment/Plan:     * Infection of amputation stump  Wound care - cultures reviewed - sensitive to Azactam   9/2- has completed abx     Morbid obesity with BMI of 50.0-59.9, adult  Complicates all levels of care    Acute hypercapnic respiratory failure  Hx ROXANN, OHS -- needs to use bipap every night   Pt placed on Bipap this morning --- ABGs returned - 7.27/62/150   - will change settings and repeat labs in 1 hour   8/29 Awake and alert this am. Placing on nasal cannula during the day and bipap at night  On nasal cannula this am. Slept with the bipap  overnight. Continue with current care  8/31- Did not wear bipap overnight and now she is confused. I am going to check an ABG. Place back on bipap. She had an episode of emesis last night and they held the bipap. We are not going to be able to hold the bipap overnight or she is going to have issues with CO2 retention  9/1- better this am. We are going to place back on nasal cannula this am. She will continue with bipap at night.  9/6- NC at present; continue bipap overnight     Hypotension  BP very labile -- seems to get worse after dialysis - started on Levophed and transferred to [facility] last night  - midodrine increased yesterday    8/29 BP looks ok this am  Now hypertensive at times  8/31- she remains on pressor. Weaning as able. MAP goal of 60  9/1- likely not getting accurate blood pressures as she has wide fluctuations in her pressures. She remains on some levophed  9/2- wean levophed as able; BP with large fluctuations in readings- questionable accuracy   9/3- will hold levo as BP is improved today with smaller fluctuations in readings; maintain MAP >60   9/4- BP remains stable with levophed infusion off   9/7- levophed remains off; BP stable    Atrial fibrillation, controlled  Rate controlled- continue eliquis    Pressure injury of buttock, unstageable  Continue with wound care    Type 2 diabetes mellitus  Glucose <180  Continue with current care  She is on 10 units of levemir and mild sliding scale  9/2 glucose controlled with current treatment  9/4  - levemir has been decreased to 5u as BG trend had been less than 180  - BG trend <120 at present   - will d/c levemir and cover with SSI if needed   9/6  - BG trend less than 200        ESRD on dialysis  Continue with dialysis per nephrology  T/TH/ Sat  - MWF HD     Deep vein thrombosis (DVT) of lower extremity  Dopplers negative here   She is on eliquis 2.5 bid    Diabetic ulcer of right heel associated with type 2 diabetes mellitus  Continue with wound care                   Donis Shi, DO  Pulmonology  [facility]

## 2022-09-07 NOTE — SUBJECTIVE & OBJECTIVE
Interval History: No acute events overnight. The patient is currently resting comfortably this am. She did sleep with the bipap overnight She is afebrile and vital signs are stable.    Objective:     Vital Signs (Most Recent):  Temp: 97.2 °F (36.2 °C) (09/07/22 0722)  Pulse: 92 (09/07/22 0722)  Resp: 12 (09/07/22 0722)  BP: (!) 106/53 (09/07/22 0722)  SpO2: 100 % (09/07/22 0722)   Vital Signs (24h Range):  Temp:  [97.2 °F (36.2 °C)-98.4 °F (36.9 °C)] 97.2 °F (36.2 °C)  Pulse:  [] 92  Resp:  [6-24] 12  SpO2:  [79 %-100 %] 100 %  BP: ()/() 106/53     Weight: 129.8 kg (286 lb 2.5 oz)  Body mass index is 50.69 kg/m².      Intake/Output Summary (Last 24 hours) at 9/7/2022 0818  Last data filed at 9/7/2022 0700  Gross per 24 hour   Intake 400 ml   Output --   Net 400 ml         Physical Exam  Vitals and nursing note reviewed.   Constitutional:       General: She is not in acute distress.     Appearance: She is obese. She is not ill-appearing.   HENT:      Head: Normocephalic and atraumatic.      Right Ear: External ear normal.      Left Ear: External ear normal.      Mouth/Throat:      Mouth: Mucous membranes are moist.      Pharynx: Oropharynx is clear.   Eyes:      Extraocular Movements: Extraocular movements intact.      Conjunctiva/sclera: Conjunctivae normal.      Pupils: Pupils are equal, round, and reactive to light.   Cardiovascular:      Rate and Rhythm: Regular rhythm. Tachycardia present.      Heart sounds: Normal heart sounds.   Pulmonary:      Effort: No respiratory distress.      Breath sounds: Normal breath sounds. No wheezing or rales.   Abdominal:      General: Bowel sounds are normal.      Palpations: Abdomen is soft.   Musculoskeletal:         General: Normal range of motion.      Cervical back: Normal range of motion.      Comments: Left amputation    Skin:     General: Skin is warm.      Coloration: Skin is not pale.      Comments: Sacral, right heel, amputation site with wounds    Neurological:      General: No focal deficit present.      Mental Status: She is alert and oriented to person, place, and time. Mental status is at baseline.   Psychiatric:         Behavior: Behavior normal.       Vents:  Oxygen Concentration (%): 45 (09/06/22 2300)    Lines/Drains/Airways       Central Venous Catheter Line  Duration                  Hemodialysis Catheter left subclavian -- days              Peripheral Intravenous Line  Duration                  Peripheral IV - Single Lumen 09/06/22 0945 20 G Left;Posterior Hand <1 day                    Significant Labs:    CBC/Anemia Profile:  Recent Labs   Lab 09/05/22  1012   WBC 13.81*   HGB 8.8*   HCT 29.2*      .3*   RDW 18.1*          Chemistries:  Recent Labs   Lab 09/05/22  1012      K 3.8      CO2 28   BUN 25*   CREATININE 4.83*   CALCIUM 9.1         All pertinent labs within the past 24 hours have been reviewed.    Significant Imaging:  I have reviewed all pertinent imaging results/findings within the past 24 hours.

## 2022-09-07 NOTE — ASSESSMENT & PLAN NOTE
Broad spectrum antitibiotics.  The condition is about the same. The patient is getting antibiotics

## 2022-09-07 NOTE — PT/OT/SLP PROGRESS
Occupational Therapy   Treatment    Name: Michelle Nunes  MRN: [id number]  Admitting Diagnosis:  Infection of amputation stump       Recommendations:     Discharge Recommendations: home with home health, nursing facility, skilled  Discharge Equipment Recommendations:  none  Barriers to discharge:       Assessment:     Michelle Nunes is a 66 y.o. female with a medical diagnosis of Infection of amputation stump.  Performance deficits affecting function are weakness, impaired endurance, impaired self care skills, impaired cognition.     Rehab Prognosis:  Fair; patient would benefit from acute skilled OT services to address these deficits and reach maximum level of function.       Plan:     Patient to be seen 5 x/week to address the above listed problems via self-care/home management, therapeutic activities, therapeutic exercises  Plan of Care Expires: 09/05/22  Plan of Care Reviewed with: patient    Subjective     Pain/Comfort:  Location 1: back  Pain Addressed 1: Distraction (pain medicine given)    Objective:     Communicated with: JAY Gaines prior to session.  Patient found HOB elevated with pulse ox (continuous), telemetry, peripheral IV, blood pressure cuff, oxygen upon OT entry to room.    General Precautions: Standard, fall, respiratory   Orthopedic Precautions:N/A   Braces: N/A  Respiratory Status: Nasal cannula, flow 4 L/min     Occupational Performance:     Bed Mobility:        Functional Mobility/Transfers:  Rolling max a x 2  Functional Mobility:     Activities of Daily Living:        [facility] 6 Click ADL:      Treatment & Education:  Pt performed aarom 10 reps x 2  shld flex,abd/add,elbow flex/ext,wrist flex/ext. Pt emotional and crying a lot today( nurse stated that she had been this way since she had a visitor earlier)Pt required encouragement to participate with ex's.     Patient left right sidelying with all lines intact    GOALS:   Multidisciplinary Problems       Occupational Therapy Goals           Problem: Occupational Therapy    Goal Priority Disciplines Outcome Interventions   Occupational Therapy Goal     OT, PT/OT Ongoing, Progressing    Description: STG:  Pt will perform grooming with setup  Pt will bathe with setup and min(A) for upper body anterior  Pt will perform UE dressing with setup  Pt will perform self- feeding with setup  Pt will perform HEP independently  Pt will tolerate 20 minutes of tx without fatigue      LT.Restore to max I with self care and mobility.                          Time Tracking:     OT Date of Treatment: 22  OT Start Time: 1419  OT Stop Time: 143  OT Total Time (min): 13 min    Billable Minutes:Therapeutic Exercise 10    OT/TARYN: TARYN          2022

## 2022-09-07 NOTE — ASSESSMENT & PLAN NOTE
BP very labile -- seems to get worse after dialysis - started on Levophed and transferred to [facility] last night  - midodrine increased yesterday    8/29 BP looks ok this am  Now hypertensive at times  8/31- she remains on pressor. Weaning as able. MAP goal of 60  9/1- likely not getting accurate blood pressures as she has wide fluctuations in her pressures. She remains on some levophed  9/2- wean levophed as able; BP with large fluctuations in readings- questionable accuracy   9/3- will hold levo as BP is improved today with smaller fluctuations in readings; maintain MAP >60   9/4- BP remains stable with levophed infusion off   9/7- levophed remains off; BP stable

## 2022-09-07 NOTE — SUBJECTIVE & OBJECTIVE
Interval History: The patient is resting.  She is much more interactive today.  Her family is at the bedside.  Blood pressure is a little better.    Review of patient's allergies indicates:   Allergen Reactions    Milk containing products     Pcn [penicillins]      Current Facility-Administered Medications   Medication Frequency    0.9%  NaCl infusion PRN    acetaminophen tablet 650 mg Q4H PRN    allopurinoL tablet 100 mg Daily    apixaban tablet 2.5 mg BID    aspirin EC tablet 81 mg Daily    atorvastatin tablet 40 mg QHS    bisacodyL EC tablet 10 mg Daily PRN    collagenase ointment Daily PRN    dextromethorphan-guaiFENesin  mg/5 ml liquid 10 mL Q6H PRN    dextrose 50% injection 12.5 g PRN    dextrose 50% injection 25 g PRN    EScitalopram oxalate tablet 10 mg QHS    folic acid tablet 1,000 mcg QAM    gabapentin capsule 200 mg QHS    glucagon (human recombinant) injection 1 mg PRN    heparin (porcine) injection 4,000 Units PRN    HYDROcodone-acetaminophen  mg per tablet 1 tablet Q8H PRN    hydrocortisone suppository 25 mg BID    hydrOXYzine pamoate capsule 25 mg Q6H PRN    insulin aspart U-100 injection 0-5 Units QID (AC + HS) PRN    levothyroxine tablet 100 mcg QAM    megestroL tablet 40 mg QAM    melatonin tablet 3 mg QHS    midodrine tablet 10 mg TID    NORepinephrine 8 mg in dextrose 5 % 250 mL infusion Continuous    ondansetron injection 4 mg Q8H PRN    pantoprazole EC tablet 40 mg Daily    simethicone chewable tablet 80 mg TID PRN    sodium chloride 0.9% flush 10 mL Q12H PRN    traZODone tablet 50 mg Nightly PRN    vitamin D 1000 units tablet 5,000 Units Daily       Objective:     Vital Signs (Most Recent):  Temp: 97.3 °F (36.3 °C) (09/07/22 1600)  Pulse: 95 (09/07/22 1600)  Resp: 12 (09/07/22 1600)  BP: (!) 85/52 (09/07/22 1600)  SpO2: 100 % (09/07/22 1600)  O2 Device (Oxygen Therapy): nasal cannula (09/07/22 1213)   Vital Signs (24h Range):  Temp:  [97 °F (36.1 °C)-98.4 °F (36.9 °C)] 97.3 °F  (36.3 °C)  Pulse:  [] 95  Resp:  [6-22] 12  SpO2:  [73 %-100 %] 100 %  BP: ()/(39-78) 85/52     Weight: 129.8 kg (286 lb 2.5 oz) (09/06/22 0000)  Body mass index is 50.69 kg/m².  Body surface area is 2.4 meters squared.    I/O last 3 completed shifts:  In: 1000 [P.O.:1000]  Out: -     Physical Exam  Vitals reviewed.   Constitutional:       Appearance: She is obese.   HENT:      Head: Normocephalic and atraumatic.      Mouth/Throat:      Mouth: Mucous membranes are moist.   Cardiovascular:      Rate and Rhythm: Regular rhythm.   Pulmonary:      Effort: Pulmonary effort is normal.   Abdominal:      Palpations: Abdomen is soft.   Skin:     General: Skin is warm.   Neurological:      Mental Status: She is alert.       Significant Labs:  BMP:   Recent Labs   Lab 09/03/22  1834 09/05/22  1012   GLU 89 161*   * 137   K 4.8 3.8    101   CO2 22 28   BUN 24* 25*   CREATININE 5.45* 4.83*   CALCIUM 8.7 9.1   MG 1.8  --      CBC:   Recent Labs   Lab 09/05/22  1012   WBC 13.81*   RBC 2.91*   HGB 8.8*   HCT 29.2*      .3*   MCH 30.2   MCHC 30.1*        Significant Imaging:  Labs: Reviewed

## 2022-09-08 LAB
GLUCOSE SERPL-MCNC: 173 MG/DL (ref 70–105)
GLUCOSE SERPL-MCNC: 190 MG/DL (ref 70–105)
GLUCOSE SERPL-MCNC: 220 MG/DL (ref 70–105)
GLUCOSE SERPL-MCNC: 223 MG/DL (ref 70–105)

## 2022-09-08 PROCEDURE — 99233 PR SUBSEQUENT HOSPITAL CARE,LEVL III: ICD-10-PCS | Mod: ,,, | Performed by: INTERNAL MEDICINE

## 2022-09-08 PROCEDURE — 82962 GLUCOSE BLOOD TEST: CPT

## 2022-09-08 PROCEDURE — 96372 THER/PROPH/DIAG INJ SC/IM: CPT

## 2022-09-08 PROCEDURE — 25000003 PHARM REV CODE 250: Performed by: FAMILY MEDICINE

## 2022-09-08 PROCEDURE — 99233 SBSQ HOSP IP/OBS HIGH 50: CPT | Mod: ,,, | Performed by: INTERNAL MEDICINE

## 2022-09-08 PROCEDURE — 94660 CPAP INITIATION&MGMT: CPT

## 2022-09-08 PROCEDURE — 27000221 HC OXYGEN, UP TO 24 HOURS

## 2022-09-08 PROCEDURE — 25000003 PHARM REV CODE 250: Performed by: INTERNAL MEDICINE

## 2022-09-08 PROCEDURE — 25000003 PHARM REV CODE 250: Performed by: NURSE PRACTITIONER

## 2022-09-08 PROCEDURE — 97110 THERAPEUTIC EXERCISES: CPT | Mod: CO

## 2022-09-08 PROCEDURE — 27000934

## 2022-09-08 PROCEDURE — 27000937

## 2022-09-08 PROCEDURE — 63600175 PHARM REV CODE 636 W HCPCS: Performed by: FAMILY MEDICINE

## 2022-09-08 PROCEDURE — 11000008

## 2022-09-08 PROCEDURE — 27000940

## 2022-09-08 PROCEDURE — 99900035 HC TECH TIME PER 15 MIN (STAT)

## 2022-09-08 RX ADMIN — MIDODRINE HYDROCHLORIDE 10 MG: 5 TABLET ORAL at 08:09

## 2022-09-08 RX ADMIN — APIXABAN 2.5 MG: 2.5 TABLET, FILM COATED ORAL at 09:09

## 2022-09-08 RX ADMIN — HYDROCORTISONE ACETATE 25 MG: 25 SUPPOSITORY RECTAL at 09:09

## 2022-09-08 RX ADMIN — MIDODRINE HYDROCHLORIDE 10 MG: 5 TABLET ORAL at 03:09

## 2022-09-08 RX ADMIN — FOLIC ACID 1000 MCG: 1 TABLET ORAL at 06:09

## 2022-09-08 RX ADMIN — MIDODRINE HYDROCHLORIDE 10 MG: 5 TABLET ORAL at 09:09

## 2022-09-08 RX ADMIN — MEGESTROL ACETATE 40 MG: 40 TABLET ORAL at 06:09

## 2022-09-08 RX ADMIN — INSULIN ASPART 1 UNITS: 100 INJECTION, SOLUTION INTRAVENOUS; SUBCUTANEOUS at 10:09

## 2022-09-08 RX ADMIN — ACETAMINOPHEN 650 MG: 325 TABLET, FILM COATED ORAL at 11:09

## 2022-09-08 RX ADMIN — Medication 5000 UNITS: at 08:09

## 2022-09-08 RX ADMIN — ESCITALOPRAM OXALATE 10 MG: 10 TABLET ORAL at 09:09

## 2022-09-08 RX ADMIN — ATORVASTATIN CALCIUM 40 MG: 40 TABLET, FILM COATED ORAL at 09:09

## 2022-09-08 RX ADMIN — ALLOPURINOL 100 MG: 100 TABLET ORAL at 08:09

## 2022-09-08 RX ADMIN — HYDROCODONE BITARTRATE AND ACETAMINOPHEN 1 TABLET: 10; 325 TABLET ORAL at 12:09

## 2022-09-08 RX ADMIN — ASPIRIN 81 MG: 81 TABLET, COATED ORAL at 09:09

## 2022-09-08 RX ADMIN — PANTOPRAZOLE SODIUM 40 MG: 40 TABLET, DELAYED RELEASE ORAL at 09:09

## 2022-09-08 RX ADMIN — LEVOTHYROXINE SODIUM 100 MCG: 100 TABLET ORAL at 06:09

## 2022-09-08 RX ADMIN — APIXABAN 2.5 MG: 2.5 TABLET, FILM COATED ORAL at 08:09

## 2022-09-08 RX ADMIN — Medication 3 MG: at 09:09

## 2022-09-08 RX ADMIN — INSULIN ASPART 2 UNITS: 100 INJECTION, SOLUTION INTRAVENOUS; SUBCUTANEOUS at 06:09

## 2022-09-08 RX ADMIN — GABAPENTIN 200 MG: 100 CAPSULE ORAL at 09:09

## 2022-09-08 RX ADMIN — HYDROCODONE BITARTRATE AND ACETAMINOPHEN 1 TABLET: 10; 325 TABLET ORAL at 08:09

## 2022-09-08 NOTE — PROGRESS NOTES
[facility]  Nephrology  Progress Note    Patient Name: Michelle Nunes  MRN: [id number]  Admission Date: 8/27/2022  Hospital Length of Stay: 12 days  Attending Provider: Donis Shi DO   Primary Care Physician: Primary Doctor No  Principal Problem:Infection of amputation stump    Subjective:     HPI: This patient is known to this nephrology service from a previous consult.  She has a history of ESRD due to HTN and DM.  She has a history of PVD and is s/p left AKA.  The patient presented with a wound on the right foot.  She has required broad spectrum antibiotics.  She has been transferred to Specialty for further care.  She is on pressor medications.      Interval History: The patient is not as talkative with me today.  No other changes.  No fevers or chills.    Review of patient's allergies indicates:   Allergen Reactions    Milk containing products     Pcn [penicillins]      Current Facility-Administered Medications   Medication Frequency    0.9%  NaCl infusion PRN    acetaminophen tablet 650 mg Q4H PRN    allopurinoL tablet 100 mg Daily    apixaban tablet 2.5 mg BID    aspirin EC tablet 81 mg Daily    atorvastatin tablet 40 mg QHS    bisacodyL EC tablet 10 mg Daily PRN    collagenase ointment Daily PRN    dextromethorphan-guaiFENesin  mg/5 ml liquid 10 mL Q6H PRN    dextrose 50% injection 12.5 g PRN    dextrose 50% injection 25 g PRN    EScitalopram oxalate tablet 10 mg QHS    folic acid tablet 1,000 mcg QAM    gabapentin capsule 200 mg QHS    glucagon (human recombinant) injection 1 mg PRN    heparin (porcine) injection 4,000 Units PRN    HYDROcodone-acetaminophen  mg per tablet 1 tablet Q8H PRN    hydrocortisone suppository 25 mg BID    hydrOXYzine pamoate capsule 25 mg Q6H PRN    insulin aspart U-100 injection 0-5 Units QID (AC + HS) PRN    levothyroxine tablet 100 mcg QAM    megestroL tablet 40 mg QAM    melatonin tablet 3 mg QHS     midodrine tablet 10 mg TID    ondansetron injection 4 mg Q8H PRN    pantoprazole EC tablet 40 mg Daily    simethicone chewable tablet 80 mg TID PRN    sodium chloride 0.9% flush 10 mL Q12H PRN    traZODone tablet 50 mg Nightly PRN    vitamin D 1000 units tablet 5,000 Units Daily       Objective:     Vital Signs (Most Recent):  Temp: 97.5 °F (36.4 °C) (09/08/22 1513)  Pulse: 92 (09/08/22 1513)  Resp: 13 (09/08/22 1513)  BP: (!) 104/51 (09/08/22 1500)  SpO2: 100 % (09/08/22 1513)  O2 Device (Oxygen Therapy): BiPAP (09/08/22 0730)   Vital Signs (24h Range):  Temp:  [97.5 °F (36.4 °C)-99.6 °F (37.6 °C)] 97.5 °F (36.4 °C)  Pulse:  [] 92  Resp:  [8-20] 13  SpO2:  [85 %-100 %] 100 %  BP: ()/(29-88) 104/51     Weight: 130.9 kg (288 lb 9.3 oz) (09/08/22 0600)  Body mass index is 51.12 kg/m².  Body surface area is 2.41 meters squared.    I/O last 3 completed shifts:  In: 1160 [P.O.:660; Other:500]  Out: 1800 [Other:1800]    Physical Exam  Vitals reviewed.   HENT:      Head: Normocephalic.      Mouth/Throat:      Mouth: Mucous membranes are moist.   Cardiovascular:      Rate and Rhythm: Regular rhythm.   Pulmonary:      Effort: Pulmonary effort is normal.   Abdominal:      Palpations: Abdomen is soft.   Neurological:      Mental Status: She is alert.       Significant Labs:  BMP:   Recent Labs   Lab 09/03/22  1834 09/05/22  1012   GLU 89 161*   * 137   K 4.8 3.8    101   CO2 22 28   BUN 24* 25*   CREATININE 5.45* 4.83*   CALCIUM 8.7 9.1   MG 1.8  --      CBC:   Recent Labs   Lab 09/05/22  1012   WBC 13.81*   RBC 2.91*   HGB 8.8*   HCT 29.2*      .3*   MCH 30.2   MCHC 30.1*        Significant Imaging:  Labs: Reviewed    Assessment/Plan:     Type 2 diabetes mellitus  Chronic condition  Continue with current management    ESRD on dialysis  Dialysis TTS  Continue with scheduled hemodialysis.  9/7/2022  Dialysis as scheduled.  9/8/2022 At this time, continue with scheduled  dialysis.    Diabetic ulcer of right heel associated with type 2 diabetes mellitus  Broad spectrum antitibiotics.  The condition is about the same. The patient is getting antibiotics        Thank you for your consult. I will follow-up with patient. Please contact us if you have any additional questions.    Jef Romero Jr, MD  Nephrology  [facility]

## 2022-09-08 NOTE — PLAN OF CARE
Problem: Adult Inpatient Plan of Care  Goal: Plan of Care Review  Outcome: Ongoing, Progressing  Goal: Patient-Specific Goal (Individualized)  Outcome: Ongoing, Progressing  Goal: Absence of Hospital-Acquired Illness or Injury  Outcome: Ongoing, Progressing  Goal: Optimal Comfort and Wellbeing  Outcome: Ongoing, Progressing     Problem: Impaired Wound Healing  Goal: Optimal Wound Healing  Outcome: Ongoing, Progressing     Problem: Skin Injury Risk Increased  Goal: Skin Health and Integrity  Outcome: Ongoing, Progressing     Problem: Noninvasive Ventilation Acute  Goal: Effective Unassisted Ventilation and Oxygenation  Outcome: Ongoing, Progressing     Problem: Gas Exchange Impaired  Goal: Optimal Gas Exchange  Outcome: Ongoing, Progressing

## 2022-09-08 NOTE — ASSESSMENT & PLAN NOTE
Dialysis TTS  Continue with scheduled hemodialysis.  9/7/2022  Dialysis as scheduled.  9/8/2022 At this time, continue with scheduled dialysis.

## 2022-09-08 NOTE — ASSESSMENT & PLAN NOTE
Glucose <180  Continue with current care  She is on 10 units of levemir and mild sliding scale  9/2 glucose controlled with current treatment  9/4  - levemir has been decreased to 5u as BG trend had been less than 180  - BG trend <120 at present   - will d/c levemir and cover with SSI if needed   9/6  - BG trend less than 200  9/8- accuchecks up a little over the last 24 hours.Will monitor through today and if needed will adjust therapy

## 2022-09-08 NOTE — PLAN OF CARE
Per IDTM, continue with current plan of care. Pt on dialysis, o2 and BIPAP at HS, wound care, working with therapy.

## 2022-09-08 NOTE — PROGRESS NOTES
[facility]  Pulmonology  Progress Note    Patient Name: Michelle Nunes  MRN: [id number]  Admission Date: 8/27/2022  Hospital Length of Stay: 12 days  Code Status: Full Code  Attending Provider: Donis Shi DO  Primary Care Provider: Primary Doctor No   Principal Problem: Infection of amputation stump    Subjective:     Interval History: No acute events overnight. The patient is currently resting comfortably this am. She did sleep with the bipap overnight She is afebrile and vital signs are stable.    Objective:     Vital Signs (Most Recent):  Temp: 97.5 °F (36.4 °C) (09/08/22 0700)  Pulse: 96 (09/08/22 0727)  Resp: 11 (09/08/22 0727)  BP: (!) 82/52 (09/08/22 0700)  SpO2: 100 % (09/08/22 0727)   Vital Signs (24h Range):  Temp:  [97 °F (36.1 °C)-99.6 °F (37.6 °C)] 97.5 °F (36.4 °C)  Pulse:  [] 96  Resp:  [8-20] 11  SpO2:  [70 %-100 %] 100 %  BP: ()/() 82/52     Weight: 130.9 kg (288 lb 9.3 oz)  Body mass index is 51.12 kg/m².      Intake/Output Summary (Last 24 hours) at 9/8/2022 0755  Last data filed at 9/8/2022 0048  Gross per 24 hour   Intake 860 ml   Output 1800 ml   Net -940 ml         Physical Exam  Vitals and nursing note reviewed.   Constitutional:       General: She is not in acute distress.     Appearance: She is obese. She is not ill-appearing.   HENT:      Head: Normocephalic and atraumatic.      Right Ear: External ear normal.      Left Ear: External ear normal.      Mouth/Throat:      Mouth: Mucous membranes are moist.      Pharynx: Oropharynx is clear.   Eyes:      Extraocular Movements: Extraocular movements intact.      Conjunctiva/sclera: Conjunctivae normal.      Pupils: Pupils are equal, round, and reactive to light.   Cardiovascular:      Rate and Rhythm: Regular rhythm. Tachycardia present.      Heart sounds: Normal heart sounds.   Pulmonary:      Effort: No respiratory distress.      Breath sounds: Normal breath sounds. No wheezing or  rales.   Abdominal:      General: Bowel sounds are normal.      Palpations: Abdomen is soft.   Musculoskeletal:         General: Normal range of motion.      Cervical back: Normal range of motion.      Comments: Left amputation    Skin:     General: Skin is warm.      Coloration: Skin is not pale.      Comments: Sacral, right heel, amputation site with wounds   Neurological:      General: No focal deficit present.      Mental Status: She is alert and oriented to person, place, and time. Mental status is at baseline.   Psychiatric:         Behavior: Behavior normal.       Vents:  Oxygen Concentration (%): 40 (09/08/22 0420)    Lines/Drains/Airways       Central Venous Catheter Line  Duration                  Hemodialysis Catheter left subclavian -- days              Peripheral Intravenous Line  Duration                  Peripheral IV - Single Lumen 09/06/22 0945 20 G Left;Posterior Hand 1 day                    Significant Labs:    CBC/Anemia Profile:  No results for input(s): WBC, HGB, HCT, PLT, MCV, RDW, IRON, FERRITIN, RETIC, FOLATE, [id number], OCCULTBLOOD in the last 48 hours.       Chemistries:  No results for input(s): NA, K, CL, CO2, BUN, CREATININE, CALCIUM, ALBUMIN, PROT, BILITOT, ALKPHOS, ALT, AST, GLUCOSE, MG, PHOS in the last 48 hours.      All pertinent labs within the past 24 hours have been reviewed.    Significant Imaging:  I have reviewed all pertinent imaging results/findings within the past 24 hours.    Assessment/Plan:     * Infection of amputation stump  Wound care - cultures reviewed - sensitive to Azactam   9/2- has completed abx     Hypotension  BP very labile -- seems to get worse after dialysis - started on Levophed and transferred to [facility] last night  - midodrine increased yesterday    8/29 BP looks ok this am  Now hypertensive at times  8/31- she remains on pressor. Weaning as able. MAP goal of 60  9/1- likely not getting accurate blood pressures as she has wide fluctuations in her  pressures. She remains on some levophed  9/2- wean levophed as able; BP with large fluctuations in readings- questionable accuracy   9/3- will hold levo as BP is improved today with smaller fluctuations in readings; maintain MAP >60   9/4- BP remains stable with levophed infusion off   9/8- levophed remains off; BP stable    Atrial fibrillation, controlled  Rate controlled- continue eliquis    Pressure injury of buttock, unstageable  Continue with wound care    Type 2 diabetes mellitus  Glucose <180  Continue with current care  She is on 10 units of levemir and mild sliding scale  9/2 glucose controlled with current treatment  9/4  - levemir has been decreased to 5u as BG trend had been less than 180  - BG trend <120 at present   - will d/c levemir and cover with SSI if needed   9/6  - BG trend less than 200  9/8- accuchecks up a little over the last 24 hours.Will monitor through today and if needed will adjust therapy    ESRD on dialysis  Continue with dialysis per nephrology  T/TH/ Sat  - MWF HD     Deep vein thrombosis (DVT) of lower extremity  Dopplers negative here   She is on eliquis 2.5 bid    Diabetic ulcer of right heel associated with type 2 diabetes mellitus  Continue with wound care                  Donis Shi,   Pulmonology  [facility]

## 2022-09-08 NOTE — SUBJECTIVE & OBJECTIVE
Interval History: The patient is not as talkative with me today.  No other changes.  No fevers or chills.    Review of patient's allergies indicates:   Allergen Reactions    Milk containing products     Pcn [penicillins]      Current Facility-Administered Medications   Medication Frequency    0.9%  NaCl infusion PRN    acetaminophen tablet 650 mg Q4H PRN    allopurinoL tablet 100 mg Daily    apixaban tablet 2.5 mg BID    aspirin EC tablet 81 mg Daily    atorvastatin tablet 40 mg QHS    bisacodyL EC tablet 10 mg Daily PRN    collagenase ointment Daily PRN    dextromethorphan-guaiFENesin  mg/5 ml liquid 10 mL Q6H PRN    dextrose 50% injection 12.5 g PRN    dextrose 50% injection 25 g PRN    EScitalopram oxalate tablet 10 mg QHS    folic acid tablet 1,000 mcg QAM    gabapentin capsule 200 mg QHS    glucagon (human recombinant) injection 1 mg PRN    heparin (porcine) injection 4,000 Units PRN    HYDROcodone-acetaminophen  mg per tablet 1 tablet Q8H PRN    hydrocortisone suppository 25 mg BID    hydrOXYzine pamoate capsule 25 mg Q6H PRN    insulin aspart U-100 injection 0-5 Units QID (AC + HS) PRN    levothyroxine tablet 100 mcg QAM    megestroL tablet 40 mg QAM    melatonin tablet 3 mg QHS    midodrine tablet 10 mg TID    ondansetron injection 4 mg Q8H PRN    pantoprazole EC tablet 40 mg Daily    simethicone chewable tablet 80 mg TID PRN    sodium chloride 0.9% flush 10 mL Q12H PRN    traZODone tablet 50 mg Nightly PRN    vitamin D 1000 units tablet 5,000 Units Daily       Objective:     Vital Signs (Most Recent):  Temp: 97.5 °F (36.4 °C) (09/08/22 1513)  Pulse: 92 (09/08/22 1513)  Resp: 13 (09/08/22 1513)  BP: (!) 104/51 (09/08/22 1500)  SpO2: 100 % (09/08/22 1513)  O2 Device (Oxygen Therapy): BiPAP (09/08/22 0730)   Vital Signs (24h Range):  Temp:  [97.5 °F (36.4 °C)-99.6 °F (37.6 °C)] 97.5 °F (36.4 °C)  Pulse:  [] 92  Resp:  [8-20] 13  SpO2:  [85 %-100 %] 100 %  BP: ()/(29-88) 104/51      Weight: 130.9 kg (288 lb 9.3 oz) (09/08/22 0600)  Body mass index is 51.12 kg/m².  Body surface area is 2.41 meters squared.    I/O last 3 completed shifts:  In: 1160 [P.O.:660; Other:500]  Out: 1800 [Other:1800]    Physical Exam  Vitals reviewed.   HENT:      Head: Normocephalic.      Mouth/Throat:      Mouth: Mucous membranes are moist.   Cardiovascular:      Rate and Rhythm: Regular rhythm.   Pulmonary:      Effort: Pulmonary effort is normal.   Abdominal:      Palpations: Abdomen is soft.   Neurological:      Mental Status: She is alert.       Significant Labs:  BMP:   Recent Labs   Lab 09/03/22  1834 09/05/22  1012   GLU 89 161*   * 137   K 4.8 3.8    101   CO2 22 28   BUN 24* 25*   CREATININE 5.45* 4.83*   CALCIUM 8.7 9.1   MG 1.8  --      CBC:   Recent Labs   Lab 09/05/22  1012   WBC 13.81*   RBC 2.91*   HGB 8.8*   HCT 29.2*      .3*   MCH 30.2   MCHC 30.1*        Significant Imaging:  Labs: Reviewed

## 2022-09-08 NOTE — ASSESSMENT & PLAN NOTE
BP very labile -- seems to get worse after dialysis - started on Levophed and transferred to [facility] last night  - midodrine increased yesterday    8/29 BP looks ok this am  Now hypertensive at times  8/31- she remains on pressor. Weaning as able. MAP goal of 60  9/1- likely not getting accurate blood pressures as she has wide fluctuations in her pressures. She remains on some levophed  9/2- wean levophed as able; BP with large fluctuations in readings- questionable accuracy   9/3- will hold levo as BP is improved today with smaller fluctuations in readings; maintain MAP >60   9/4- BP remains stable with levophed infusion off   9/8- levophed remains off; BP stable

## 2022-09-08 NOTE — SUBJECTIVE & OBJECTIVE
Interval History: No acute events overnight. The patient is currently resting comfortably this am. She did sleep with the bipap overnight She is afebrile and vital signs are stable.    Objective:     Vital Signs (Most Recent):  Temp: 97.5 °F (36.4 °C) (09/08/22 0700)  Pulse: 96 (09/08/22 0727)  Resp: 11 (09/08/22 0727)  BP: (!) 82/52 (09/08/22 0700)  SpO2: 100 % (09/08/22 0727)   Vital Signs (24h Range):  Temp:  [97 °F (36.1 °C)-99.6 °F (37.6 °C)] 97.5 °F (36.4 °C)  Pulse:  [] 96  Resp:  [8-20] 11  SpO2:  [70 %-100 %] 100 %  BP: ()/() 82/52     Weight: 130.9 kg (288 lb 9.3 oz)  Body mass index is 51.12 kg/m².      Intake/Output Summary (Last 24 hours) at 9/8/2022 0755  Last data filed at 9/8/2022 0048  Gross per 24 hour   Intake 860 ml   Output 1800 ml   Net -940 ml         Physical Exam  Vitals and nursing note reviewed.   Constitutional:       General: She is not in acute distress.     Appearance: She is obese. She is not ill-appearing.   HENT:      Head: Normocephalic and atraumatic.      Right Ear: External ear normal.      Left Ear: External ear normal.      Mouth/Throat:      Mouth: Mucous membranes are moist.      Pharynx: Oropharynx is clear.   Eyes:      Extraocular Movements: Extraocular movements intact.      Conjunctiva/sclera: Conjunctivae normal.      Pupils: Pupils are equal, round, and reactive to light.   Cardiovascular:      Rate and Rhythm: Regular rhythm. Tachycardia present.      Heart sounds: Normal heart sounds.   Pulmonary:      Effort: No respiratory distress.      Breath sounds: Normal breath sounds. No wheezing or rales.   Abdominal:      General: Bowel sounds are normal.      Palpations: Abdomen is soft.   Musculoskeletal:         General: Normal range of motion.      Cervical back: Normal range of motion.      Comments: Left amputation    Skin:     General: Skin is warm.      Coloration: Skin is not pale.      Comments: Sacral, right heel, amputation site with wounds    Neurological:      General: No focal deficit present.      Mental Status: She is alert and oriented to person, place, and time. Mental status is at baseline.   Psychiatric:         Behavior: Behavior normal.       Vents:  Oxygen Concentration (%): 40 (09/08/22 0420)    Lines/Drains/Airways       Central Venous Catheter Line  Duration                  Hemodialysis Catheter left subclavian -- days              Peripheral Intravenous Line  Duration                  Peripheral IV - Single Lumen 09/06/22 0945 20 G Left;Posterior Hand 1 day                    Significant Labs:    CBC/Anemia Profile:  No results for input(s): WBC, HGB, HCT, PLT, MCV, RDW, IRON, FERRITIN, RETIC, FOLATE, [id number], OCCULTBLOOD in the last 48 hours.       Chemistries:  No results for input(s): NA, K, CL, CO2, BUN, CREATININE, CALCIUM, ALBUMIN, PROT, BILITOT, ALKPHOS, ALT, AST, GLUCOSE, MG, PHOS in the last 48 hours.      All pertinent labs within the past 24 hours have been reviewed.    Significant Imaging:  I have reviewed all pertinent imaging results/findings within the past 24 hours.

## 2022-09-08 NOTE — PROGRESS NOTES
Wound care note;  Patient skin was evaluated today  Patient in bed, alert, talkative   Right heel wound with tan necrotic tissue , outer edges moist, loose peeling skin, Little change noted , evolving   Right lateral buttock wound with black /tan slough noted, cont to evolve.   Left buttock wound with pink/tan tissue, scattered skin, moist, cont Duoderm wafer   Left anterior AKA wound with pink slow granular tissue , cont vashe with drawtex to wound bed.   Right clavicle wound with dark tan tissue   Overall wounds with little improvement noted. Cont sanlty ointment to Right heel and Right lateral buttock   Cont turn protocol  Pillow to Right heel  On Custom Bariatric Low air loss mattress   Wound healing remains complicated due to diabetes , limited mobility, obesity, etc.  Wound care team to follow

## 2022-09-08 NOTE — PT/OT/SLP PROGRESS
Occupational Therapy   Treatment    Name: Michelle Nunes  MRN: [id number]  Admitting Diagnosis:  Infection of amputation stump       Recommendations:     Discharge Recommendations: nursing facility, skilled, home with home health  Discharge Equipment Recommendations:  none  Barriers to discharge:       Assessment:     Michelle Nunes is a 66 y.o. female with a medical diagnosis of Infection of amputation stump.  . Performance deficits affecting function are weakness, impaired endurance, impaired self care skills, impaired cognition.     Rehab Prognosis:  Fair; patient would benefit from acute skilled OT services to address these deficits and reach maximum level of function.       Plan:     Patient to be seen 5 x/week to address the above listed problems via self-care/home management, therapeutic exercises, therapeutic activities  Plan of Care Expires: 09/05/22  Plan of Care Reviewed with: patient    Subjective     Pain/Comfort:  Location 1:  (c/o side hurting)  Pain Addressed 1:  (pain medicine given)    Objective:     Communicated with: JAY Gan prior to session.  Patient found right sidelying with telemetry, peripheral IV, blood pressure cuff, pulse ox (continuous), oxygen upon OT entry to room.    General Precautions: Standard, fall, respiratory   Orthopedic Precautions:N/A   Braces: N/A  Respiratory Status: Nasal cannula, flow 3 L/min     Occupational Performance:     Bed Mobility:        Functional Mobility/Transfers:  Functional Mobility  : Activities of Daily Living:  Set up to perform oral care  Min a to wash her face       AMPAC 6 Click ADL:      Treatment & Education:  Pt performed aarom 15 reps x 2 B shld flex,abd/add,elbow flex/ext,wrist flex/ext, hand helper with 2 rubber band resistances 10 reps on R. Pt crying and emotional today and required encouragement to participate.         Patient left HOB elevated with all lines intact    GOALS:   Multidisciplinary Problems       Occupational Therapy  Goals          Problem: Occupational Therapy    Goal Priority Disciplines Outcome Interventions   Occupational Therapy Goal     OT, PT/OT Ongoing, Progressing    Description: STG:  Pt will perform grooming with setup  Pt will bathe with setup and min(A) for upper body anterior  Pt will perform UE dressing with setup  Pt will perform self- feeding with setup  Pt will perform HEP independently  Pt will tolerate 20 minutes of tx without fatigue      LT.Restore to max I with self care and mobility.                          Time Tracking:     OT Date of Treatment: 22  OT Start Time: 1328  OT Stop Time: 1356  OT Total Time (min): 28 min    Billable Minutes:Therapeutic Exercise 15    OT/TARYN: TARYN          2022

## 2022-09-09 LAB
ANION GAP SERPL CALCULATED.3IONS-SCNC: 16 MMOL/L (ref 7–16)
BASOPHILS # BLD AUTO: 0.07 K/UL (ref 0–0.2)
BASOPHILS NFR BLD AUTO: 0.5 % (ref 0–1)
BUN SERPL-MCNC: 32 MG/DL (ref 7–18)
BUN/CREAT SERPL: 6 (ref 6–20)
CALCIUM SERPL-MCNC: 9 MG/DL (ref 8.5–10.1)
CHLORIDE SERPL-SCNC: 102 MMOL/L (ref 98–107)
CO2 SERPL-SCNC: 25 MMOL/L (ref 21–32)
CREAT SERPL-MCNC: 5.07 MG/DL (ref 0.55–1.02)
DIFFERENTIAL METHOD BLD: ABNORMAL
EGFR (NO RACE VARIABLE) (RUSH/TITUS): 9 ML/MIN/1.73M²
EOSINOPHIL # BLD AUTO: 0.06 K/UL (ref 0–0.5)
EOSINOPHIL NFR BLD AUTO: 0.4 % (ref 1–4)
ERYTHROCYTE [DISTWIDTH] IN BLOOD BY AUTOMATED COUNT: 18.1 % (ref 11.5–14.5)
GLUCOSE SERPL-MCNC: 149 MG/DL (ref 70–105)
GLUCOSE SERPL-MCNC: 152 MG/DL (ref 70–105)
GLUCOSE SERPL-MCNC: 153 MG/DL (ref 70–105)
GLUCOSE SERPL-MCNC: 200 MG/DL (ref 70–105)
GLUCOSE SERPL-MCNC: 205 MG/DL (ref 74–106)
HCT VFR BLD AUTO: 24.8 % (ref 38–47)
HGB BLD-MCNC: 7.6 G/DL (ref 12–16)
IMM GRANULOCYTES # BLD AUTO: 0.1 K/UL (ref 0–0.04)
IMM GRANULOCYTES NFR BLD: 0.7 % (ref 0–0.4)
LYMPHOCYTES # BLD AUTO: 0.86 K/UL (ref 1–4.8)
LYMPHOCYTES NFR BLD AUTO: 6 % (ref 27–41)
MCH RBC QN AUTO: 30.2 PG (ref 27–31)
MCHC RBC AUTO-ENTMCNC: 30.6 G/DL (ref 32–36)
MCV RBC AUTO: 98.4 FL (ref 80–96)
MONOCYTES # BLD AUTO: 0.68 K/UL (ref 0–0.8)
MONOCYTES NFR BLD AUTO: 4.8 % (ref 2–6)
MPC BLD CALC-MCNC: 10.9 FL (ref 9.4–12.4)
NEUTROPHILS # BLD AUTO: 12.47 K/UL (ref 1.8–7.7)
NEUTROPHILS NFR BLD AUTO: 87.6 % (ref 53–65)
NRBC # BLD AUTO: 0.06 X10E3/UL
NRBC, AUTO (.00): 0.4 %
PLATELET # BLD AUTO: 257 K/UL (ref 150–400)
POTASSIUM SERPL-SCNC: 4.5 MMOL/L (ref 3.5–5.1)
RBC # BLD AUTO: 2.52 M/UL (ref 4.2–5.4)
SODIUM SERPL-SCNC: 138 MMOL/L (ref 136–145)
WBC # BLD AUTO: 14.24 K/UL (ref 4.5–11)

## 2022-09-09 PROCEDURE — 25000003 PHARM REV CODE 250: Performed by: NURSE PRACTITIONER

## 2022-09-09 PROCEDURE — 63600175 PHARM REV CODE 636 W HCPCS: Performed by: FAMILY MEDICINE

## 2022-09-09 PROCEDURE — 99900035 HC TECH TIME PER 15 MIN (STAT)

## 2022-09-09 PROCEDURE — 25000003 PHARM REV CODE 250: Performed by: FAMILY MEDICINE

## 2022-09-09 PROCEDURE — 27000940

## 2022-09-09 PROCEDURE — 27000934

## 2022-09-09 PROCEDURE — 99233 SBSQ HOSP IP/OBS HIGH 50: CPT | Mod: ,,, | Performed by: INTERNAL MEDICINE

## 2022-09-09 PROCEDURE — 63600175 PHARM REV CODE 636 W HCPCS: Performed by: INTERNAL MEDICINE

## 2022-09-09 PROCEDURE — 25000003 PHARM REV CODE 250: Performed by: INTERNAL MEDICINE

## 2022-09-09 PROCEDURE — 80048 BASIC METABOLIC PNL TOTAL CA: CPT | Performed by: NURSE PRACTITIONER

## 2022-09-09 PROCEDURE — 80100014 HC HEMODIALYSIS 1:1

## 2022-09-09 PROCEDURE — 11000008

## 2022-09-09 PROCEDURE — 94660 CPAP INITIATION&MGMT: CPT

## 2022-09-09 PROCEDURE — 99233 PR SUBSEQUENT HOSPITAL CARE,LEVL III: ICD-10-PCS | Mod: ,,, | Performed by: INTERNAL MEDICINE

## 2022-09-09 PROCEDURE — 27000221 HC OXYGEN, UP TO 24 HOURS

## 2022-09-09 PROCEDURE — 85025 COMPLETE CBC W/AUTO DIFF WBC: CPT | Performed by: NURSE PRACTITIONER

## 2022-09-09 PROCEDURE — 94761 N-INVAS EAR/PLS OXIMETRY MLT: CPT

## 2022-09-09 PROCEDURE — 36591 DRAW BLOOD OFF VENOUS DEVICE: CPT | Performed by: NURSE PRACTITIONER

## 2022-09-09 PROCEDURE — 82962 GLUCOSE BLOOD TEST: CPT

## 2022-09-09 PROCEDURE — 27000952

## 2022-09-09 RX ADMIN — COLLAGENASE SANTYL: 250 OINTMENT TOPICAL at 02:09

## 2022-09-09 RX ADMIN — HYDROCODONE BITARTRATE AND ACETAMINOPHEN 1 TABLET: 10; 325 TABLET ORAL at 06:09

## 2022-09-09 RX ADMIN — PANTOPRAZOLE SODIUM 40 MG: 40 TABLET, DELAYED RELEASE ORAL at 08:09

## 2022-09-09 RX ADMIN — APIXABAN 2.5 MG: 2.5 TABLET, FILM COATED ORAL at 08:09

## 2022-09-09 RX ADMIN — LEVOTHYROXINE SODIUM 100 MCG: 100 TABLET ORAL at 06:09

## 2022-09-09 RX ADMIN — INSULIN DETEMIR 10 UNITS: 100 INJECTION, SOLUTION SUBCUTANEOUS at 08:09

## 2022-09-09 RX ADMIN — MEGESTROL ACETATE 40 MG: 40 TABLET ORAL at 06:09

## 2022-09-09 RX ADMIN — ESCITALOPRAM OXALATE 10 MG: 10 TABLET ORAL at 08:09

## 2022-09-09 RX ADMIN — ASPIRIN 81 MG: 81 TABLET, COATED ORAL at 08:09

## 2022-09-09 RX ADMIN — ATORVASTATIN CALCIUM 40 MG: 40 TABLET, FILM COATED ORAL at 08:09

## 2022-09-09 RX ADMIN — ALLOPURINOL 100 MG: 100 TABLET ORAL at 08:09

## 2022-09-09 RX ADMIN — Medication 5000 UNITS: at 08:09

## 2022-09-09 RX ADMIN — MIDODRINE HYDROCHLORIDE 10 MG: 5 TABLET ORAL at 08:09

## 2022-09-09 RX ADMIN — HYDROXYZINE PAMOATE 25 MG: 25 CAPSULE ORAL at 08:09

## 2022-09-09 RX ADMIN — SODIUM CHLORIDE: 9 INJECTION, SOLUTION INTRAVENOUS at 09:09

## 2022-09-09 RX ADMIN — GABAPENTIN 200 MG: 100 CAPSULE ORAL at 08:09

## 2022-09-09 RX ADMIN — FOLIC ACID 1000 MCG: 1 TABLET ORAL at 06:09

## 2022-09-09 RX ADMIN — HYDROCORTISONE ACETATE 25 MG: 25 SUPPOSITORY RECTAL at 09:09

## 2022-09-09 RX ADMIN — Medication 3 MG: at 08:09

## 2022-09-09 RX ADMIN — HEPARIN SODIUM 4000 UNITS: 1000 INJECTION INTRAVENOUS; SUBCUTANEOUS at 12:09

## 2022-09-09 NOTE — PLAN OF CARE
Problem: Adult Inpatient Plan of Care  Goal: Optimal Comfort and Wellbeing  Outcome: Ongoing, Progressing  Intervention: Monitor Pain and Promote Comfort  Flowsheets (Taken 9/9/2022 1627)  Pain Management Interventions:   care clustered   medication offered   medication offered but refused   pain management plan reviewed with patient/caregiver   pillow support provided   position adjusted   quiet environment facilitated   relaxation techniques promoted  Intervention: Provide Person-Centered Care  Flowsheets (Taken 9/9/2022 1627)  Trust Relationship/Rapport:   care explained   choices provided   emotional support provided   empathic listening provided   questions answered   questions encouraged   reassurance provided   thoughts/feelings acknowledged

## 2022-09-09 NOTE — ASSESSMENT & PLAN NOTE
Glucose <180  Continue with current care  She is on 10 units of levemir and mild sliding scale  9/2 glucose controlled with current treatment  9/4  - levemir has been decreased to 5u as BG trend had been less than 180  - BG trend <120 at present   - will d/c levemir and cover with SSI if needed   9/6  - BG trend less than 200  9/8- accuchecks up a little over the last 24 hours.Will monitor through today and if needed will adjust therapy  9/9 [id number]. I am going to increase levemir to 14 units

## 2022-09-09 NOTE — ASSESSMENT & PLAN NOTE
BP very labile -- seems to get worse after dialysis - started on Levophed and transferred to [facility] last night  - midodrine increased yesterday    8/29 BP looks ok this am  Now hypertensive at times  8/31- she remains on pressor. Weaning as able. MAP goal of 60  9/1- likely not getting accurate blood pressures as she has wide fluctuations in her pressures. She remains on some levophed  9/2- wean levophed as able; BP with large fluctuations in readings- questionable accuracy   9/3- will hold levo as BP is improved today with smaller fluctuations in readings; maintain MAP >60   9/4- BP remains stable with levophed infusion off   9/9- levophed remains off; BP stable

## 2022-09-09 NOTE — ASSESSMENT & PLAN NOTE
Dialysis TTS  Continue with scheduled hemodialysis.  9/7/2022  Dialysis as scheduled.  9/8/2022 At this time, continue with scheduled dialysis.  9/9/2022  Dialysis as scheduled

## 2022-09-09 NOTE — PROGRESS NOTES
[facility]  Nephrology  Progress Note    Patient Name: Michelle Nunes  MRN: [id number]  Admission Date: 8/27/2022  Hospital Length of Stay: 13 days  Attending Provider: Donis Shi DO   Primary Care Physician: Primary Doctor No  Principal Problem:Infection of amputation stump    Subjective:     HPI: This patient is known to this nephrology service from a previous consult.  She has a history of ESRD due to HTN and DM.  She has a history of PVD and is s/p left AKA.  The patient presented with a wound on the right foot.  She has required broad spectrum antibiotics.  She has been transferred to Specialty for further care.  She is on pressor medications.      Interval History: The patient is visiting with her family.  She is insistent about going home.      Review of patient's allergies indicates:   Allergen Reactions    Milk containing products     Pcn [penicillins]      Current Facility-Administered Medications   Medication Frequency    0.9%  NaCl infusion PRN    acetaminophen tablet 650 mg Q4H PRN    allopurinoL tablet 100 mg Daily    apixaban tablet 2.5 mg BID    aspirin EC tablet 81 mg Daily    atorvastatin tablet 40 mg QHS    bisacodyL EC tablet 10 mg Daily PRN    collagenase ointment Daily PRN    dextromethorphan-guaiFENesin  mg/5 ml liquid 10 mL Q6H PRN    dextrose 50% injection 12.5 g PRN    dextrose 50% injection 25 g PRN    EScitalopram oxalate tablet 10 mg QHS    folic acid tablet 1,000 mcg QAM    gabapentin capsule 200 mg QHS    glucagon (human recombinant) injection 1 mg PRN    heparin (porcine) injection 4,000 Units PRN    HYDROcodone-acetaminophen  mg per tablet 1 tablet Q8H PRN    hydrocortisone suppository 25 mg BID    hydrOXYzine pamoate capsule 25 mg Q6H PRN    insulin aspart U-100 injection 0-5 Units QID (AC + HS) PRN    insulin detemir U-100 injection 10 Units QHS    levothyroxine tablet 100 mcg QAM    megestroL tablet 40  mg QAM    melatonin tablet 3 mg QHS    midodrine tablet 10 mg TID    ondansetron injection 4 mg Q8H PRN    pantoprazole EC tablet 40 mg Daily    simethicone chewable tablet 80 mg TID PRN    sodium chloride 0.9% flush 10 mL Q12H PRN    traZODone tablet 50 mg Nightly PRN    vitamin D 1000 units tablet 5,000 Units Daily       Objective:     Vital Signs (Most Recent):  Temp: 99.1 °F (37.3 °C) (09/09/22 1500)  Pulse: 110 (09/09/22 1600)  Resp: 13 (09/09/22 1600)  BP: 124/86 (09/09/22 1500)  SpO2: 100 % (09/09/22 1600)  O2 Device (Oxygen Therapy): BiPAP (09/09/22 0230) Vital Signs (24h Range):  Temp:  [97.4 °F (36.3 °C)-99.1 °F (37.3 °C)] 99.1 °F (37.3 °C)  Pulse:  [] 110  Resp:  [9-18] 13  SpO2:  [91 %-100 %] 100 %  BP: ()/() 124/86     Weight: 130.9 kg (288 lb 9.3 oz) (09/08/22 0600)  Body mass index is 51.12 kg/m².  Body surface area is 2.41 meters squared.    I/O last 3 completed shifts:  In: 360 [P.O.:360]  Out: -     Physical Exam  Vitals reviewed.   Constitutional:       Appearance: She is obese.   HENT:      Head: Atraumatic.   Cardiovascular:      Rate and Rhythm: Regular rhythm.   Pulmonary:      Effort: Pulmonary effort is normal.   Abdominal:      Palpations: Abdomen is soft.   Musculoskeletal:         General: Swelling present.   Neurological:      Mental Status: She is alert.       Significant Labs:  BMP:   Recent Labs   Lab 09/03/22  1834 09/05/22  1012 09/09/22  0907   GLU 89   < > 205*   *   < > 138   K 4.8   < > 4.5      < > 102   CO2 22   < > 25   BUN 24*   < > 32*   CREATININE 5.45*   < > 5.07*   CALCIUM 8.7   < > 9.0   MG 1.8  --   --     < > = values in this interval not displayed.     CBC:   Recent Labs   Lab 09/09/22  0907   WBC 14.24*   RBC 2.52*   HGB 7.6*   HCT 24.8*      MCV 98.4*   MCH 30.2   MCHC 30.6*        Significant Imaging:  Labs: Reviewed    Assessment/Plan:     PVD (peripheral vascular disease)  Status post left BKA    Type 2 diabetes  mellitus  Chronic condition  Continue with current management    ESRD on dialysis  Dialysis TTS  Continue with scheduled hemodialysis.  9/7/2022  Dialysis as scheduled.  9/8/2022 At this time, continue with scheduled dialysis.  9/9/2022  Dialysis as scheduled        Thank you for your consult. I will follow-up with patient. Please contact us if you have any additional questions.    Jef Romero Jr, MD  Nephrology  [facility]

## 2022-09-09 NOTE — SUBJECTIVE & OBJECTIVE
Interval History: The patient is visiting with her family.  She is insistent about going home.      Review of patient's allergies indicates:   Allergen Reactions    Milk containing products     Pcn [penicillins]      Current Facility-Administered Medications   Medication Frequency    0.9%  NaCl infusion PRN    acetaminophen tablet 650 mg Q4H PRN    allopurinoL tablet 100 mg Daily    apixaban tablet 2.5 mg BID    aspirin EC tablet 81 mg Daily    atorvastatin tablet 40 mg QHS    bisacodyL EC tablet 10 mg Daily PRN    collagenase ointment Daily PRN    dextromethorphan-guaiFENesin  mg/5 ml liquid 10 mL Q6H PRN    dextrose 50% injection 12.5 g PRN    dextrose 50% injection 25 g PRN    EScitalopram oxalate tablet 10 mg QHS    folic acid tablet 1,000 mcg QAM    gabapentin capsule 200 mg QHS    glucagon (human recombinant) injection 1 mg PRN    heparin (porcine) injection 4,000 Units PRN    HYDROcodone-acetaminophen  mg per tablet 1 tablet Q8H PRN    hydrocortisone suppository 25 mg BID    hydrOXYzine pamoate capsule 25 mg Q6H PRN    insulin aspart U-100 injection 0-5 Units QID (AC + HS) PRN    insulin detemir U-100 injection 10 Units QHS    levothyroxine tablet 100 mcg QAM    megestroL tablet 40 mg QAM    melatonin tablet 3 mg QHS    midodrine tablet 10 mg TID    ondansetron injection 4 mg Q8H PRN    pantoprazole EC tablet 40 mg Daily    simethicone chewable tablet 80 mg TID PRN    sodium chloride 0.9% flush 10 mL Q12H PRN    traZODone tablet 50 mg Nightly PRN    vitamin D 1000 units tablet 5,000 Units Daily       Objective:     Vital Signs (Most Recent):  Temp: 99.1 °F (37.3 °C) (09/09/22 1500)  Pulse: 110 (09/09/22 1600)  Resp: 13 (09/09/22 1600)  BP: 124/86 (09/09/22 1500)  SpO2: 100 % (09/09/22 1600)  O2 Device (Oxygen Therapy): BiPAP (09/09/22 0230) Vital Signs (24h Range):  Temp:  [97.4 °F (36.3 °C)-99.1 °F (37.3 °C)] 99.1 °F (37.3 °C)  Pulse:  [] 110  Resp:  [9-18] 13  SpO2:  [91 %-100 %] 100  %  BP: ()/() 124/86     Weight: 130.9 kg (288 lb 9.3 oz) (09/08/22 0600)  Body mass index is 51.12 kg/m².  Body surface area is 2.41 meters squared.    I/O last 3 completed shifts:  In: 360 [P.O.:360]  Out: -     Physical Exam  Vitals reviewed.   Constitutional:       Appearance: She is obese.   HENT:      Head: Atraumatic.   Cardiovascular:      Rate and Rhythm: Regular rhythm.   Pulmonary:      Effort: Pulmonary effort is normal.   Abdominal:      Palpations: Abdomen is soft.   Musculoskeletal:         General: Swelling present.   Neurological:      Mental Status: She is alert.       Significant Labs:  BMP:   Recent Labs   Lab 09/03/22  1834 09/05/22  1012 09/09/22  0907   GLU 89   < > 205*   *   < > 138   K 4.8   < > 4.5      < > 102   CO2 22   < > 25   BUN 24*   < > 32*   CREATININE 5.45*   < > 5.07*   CALCIUM 8.7   < > 9.0   MG 1.8  --   --     < > = values in this interval not displayed.     CBC:   Recent Labs   Lab 09/09/22  0907   WBC 14.24*   RBC 2.52*   HGB 7.6*   HCT 24.8*      MCV 98.4*   MCH 30.2   MCHC 30.6*        Significant Imaging:  Labs: Reviewed

## 2022-09-09 NOTE — PROGRESS NOTES
[facility]  Pulmonology  Progress Note    Patient Name: Michelle Nunes  MRN: [id number]  Admission Date: 8/27/2022  Hospital Length of Stay: 13 days  Code Status: Full Code  Attending Provider: oDnis Shi DO  Primary Care Provider: Primary Doctor No   Principal Problem: Infection of amputation stump    Subjective:     Interval History: No acute events overnight. The patient is currently resting comfortably this am. She did sleep with the bipap overnight. HD today. She is afebrile and vital signs are stable.    Objective:     Vital Signs (Most Recent):  Temp: 98.3 °F (36.8 °C) (09/09/22 0855)  Pulse: 102 (09/09/22 0930)  Resp: 12 (09/09/22 0930)  BP: 93/66 (09/09/22 0930)  SpO2: 100 % (09/09/22 0930)   Vital Signs (24h Range):  Temp:  [97.4 °F (36.3 °C)-98.9 °F (37.2 °C)] 98.3 °F (36.8 °C)  Pulse:  [] 102  Resp:  [9-18] 12  SpO2:  [91 %-100 %] 100 %  BP: ()/() 93/66     Weight: 130.9 kg (288 lb 9.3 oz)  Body mass index is 51.12 kg/m².    No intake or output data in the 24 hours ending 09/09/22 0944      Physical Exam  Vitals and nursing note reviewed.   Constitutional:       General: She is not in acute distress.     Appearance: She is obese. She is not ill-appearing.   HENT:      Head: Normocephalic and atraumatic.      Right Ear: External ear normal.      Left Ear: External ear normal.      Mouth/Throat:      Mouth: Mucous membranes are moist.      Pharynx: Oropharynx is clear.   Eyes:      Extraocular Movements: Extraocular movements intact.      Conjunctiva/sclera: Conjunctivae normal.      Pupils: Pupils are equal, round, and reactive to light.   Cardiovascular:      Rate and Rhythm: Regular rhythm. Tachycardia present.      Heart sounds: Normal heart sounds.   Pulmonary:      Effort: No respiratory distress.      Breath sounds: Normal breath sounds. No wheezing or rales.   Abdominal:      General: Bowel sounds are normal.      Palpations: Abdomen is  soft.   Musculoskeletal:         General: Normal range of motion.      Cervical back: Normal range of motion.      Comments: Left amputation    Skin:     General: Skin is warm.      Coloration: Skin is not pale.      Comments: Sacral, right heel, amputation site with wounds   Neurological:      General: No focal deficit present.      Mental Status: She is alert and oriented to person, place, and time. Mental status is at baseline.   Psychiatric:         Behavior: Behavior normal.       Vents:  Oxygen Concentration (%): 35 (09/09/22 0230)    Lines/Drains/Airways       Central Venous Catheter Line  Duration                  Hemodialysis Catheter left subclavian -- days              Peripheral Intravenous Line  Duration                  Peripheral IV - Single Lumen 09/06/22 0945 20 G Left;Posterior Hand 2 days                    Significant Labs:    CBC/Anemia Profile:  Recent Labs   Lab 09/09/22  0907   WBC 14.24*   HGB 7.6*   HCT 24.8*      MCV 98.4*   RDW 18.1*          Chemistries:  No results for input(s): NA, K, CL, CO2, BUN, CREATININE, CALCIUM, ALBUMIN, PROT, BILITOT, ALKPHOS, ALT, AST, GLUCOSE, MG, PHOS in the last 48 hours.      All pertinent labs within the past 24 hours have been reviewed.    Significant Imaging:  I have reviewed all pertinent imaging results/findings within the past 24 hours.    Assessment/Plan:     * Infection of amputation stump  Wound care - cultures reviewed - sensitive to Azactam   9/2- has completed abx     Morbid obesity with BMI of 50.0-59.9, adult  Complicates all levels of care    Acute hypercapnic respiratory failure  Hx ROXANN, [facility] -- needs to use bipap every night   Pt placed on Bipap this morning --- ABGs returned - 7.27/62/150   - will change settings and repeat labs in 1 hour   8/29 Awake and alert this am. Placing on nasal cannula during the day and bipap at night  On nasal cannula this am. Slept with the bipap overnight. Continue with current care  8/31- Did not wear  bipap overnight and now she is confused. I am going to check an ABG. Place back on bipap. She had an episode of emesis last night and they held the bipap. We are not going to be able to hold the bipap overnight or she is going to have issues with CO2 retention  9/1- better this am. We are going to place back on nasal cannula this am. She will continue with bipap at night.  9/6- NC at present; continue bipap overnight   9/9- continue with bipap at night (not optional), nasal cannula during the day. If she does not wear bipap for even one night she is at increased risk for acute hypercapnea.    Hypotension  BP very labile -- seems to get worse after dialysis - started on Levophed and transferred to [facility] last night  - midodrine increased yesterday    8/29 BP looks ok this am  Now hypertensive at times  8/31- she remains on pressor. Weaning as able. MAP goal of 60  9/1- likely not getting accurate blood pressures as she has wide fluctuations in her pressures. She remains on some levophed  9/2- wean levophed as able; BP with large fluctuations in readings- questionable accuracy   9/3- will hold levo as BP is improved today with smaller fluctuations in readings; maintain MAP >60   9/4- BP remains stable with levophed infusion off   9/9- levophed remains off; BP stable    Atrial fibrillation, controlled  Rate controlled- continue eliquis    Pressure injury of buttock, unstageable  Continue with wound care    Type 2 diabetes mellitus  Glucose <180  Continue with current care  She is on 10 units of levemir and mild sliding scale  9/2 glucose controlled with current treatment  9/4  - levemir has been decreased to 5u as BG trend had been less than 180  - BG trend <120 at present   - will d/c levemir and cover with SSI if needed   9/6  - BG trend less than 200  9/8- accuchecks up a little over the last 24 hours.Will monitor through today and if needed will adjust therapy  9/9 153-223. I am going to increase levemir to 14  units    ESRD on dialysis  Continue with dialysis per nephrology  - MWF HD     Deep vein thrombosis (DVT) of lower extremity  Dopplers negative here   She is on eliquis 2.5 bid    Diabetic ulcer of right heel associated with type 2 diabetes mellitus  Continue with wound care         Initiated levemir 10 units today. She had been on levemir at one point but had some hypoglycemia. Will have to continue to watch closely. Glucose goal          Donis Shi DO  Pulmonology  [facility] [place]

## 2022-09-09 NOTE — ASSESSMENT & PLAN NOTE
Hx ROXANN, [facility] -- needs to use bipap every night   Pt placed on Bipap this morning --- ABGs returned - 7.27/62/150   - will change settings and repeat labs in 1 hour   8/29 Awake and alert this am. Placing on nasal cannula during the day and bipap at night  On nasal cannula this am. Slept with the bipap overnight. Continue with current care  8/31- Did not wear bipap overnight and now she is confused. I am going to check an ABG. Place back on bipap. She had an episode of emesis last night and they held the bipap. We are not going to be able to hold the bipap overnight or she is going to have issues with CO2 retention  9/1- better this am. We are going to place back on nasal cannula this am. She will continue with bipap at night.  9/6- NC at present; continue bipap overnight   9/9- continue with bipap at night (not optional), nasal cannula during the day. If she does not wear bipap for even one night she is at increased risk for acute hypercapnea.

## 2022-09-09 NOTE — SUBJECTIVE & OBJECTIVE
Interval History: No acute events overnight. The patient is currently resting comfortably this am. She did sleep with the bipap overnight. HD today. She is afebrile and vital signs are stable.    Objective:     Vital Signs (Most Recent):  Temp: 98.3 °F (36.8 °C) (09/09/22 0855)  Pulse: 102 (09/09/22 0930)  Resp: 12 (09/09/22 0930)  BP: 93/66 (09/09/22 0930)  SpO2: 100 % (09/09/22 0930)   Vital Signs (24h Range):  Temp:  [97.4 °F (36.3 °C)-98.9 °F (37.2 °C)] 98.3 °F (36.8 °C)  Pulse:  [] 102  Resp:  [9-18] 12  SpO2:  [91 %-100 %] 100 %  BP: ()/() 93/66     Weight: 130.9 kg (288 lb 9.3 oz)  Body mass index is 51.12 kg/m².    No intake or output data in the 24 hours ending 09/09/22 0944      Physical Exam  Vitals and nursing note reviewed.   Constitutional:       General: She is not in acute distress.     Appearance: She is obese. She is not ill-appearing.   HENT:      Head: Normocephalic and atraumatic.      Right Ear: External ear normal.      Left Ear: External ear normal.      Mouth/Throat:      Mouth: Mucous membranes are moist.      Pharynx: Oropharynx is clear.   Eyes:      Extraocular Movements: Extraocular movements intact.      Conjunctiva/sclera: Conjunctivae normal.      Pupils: Pupils are equal, round, and reactive to light.   Cardiovascular:      Rate and Rhythm: Regular rhythm. Tachycardia present.      Heart sounds: Normal heart sounds.   Pulmonary:      Effort: No respiratory distress.      Breath sounds: Normal breath sounds. No wheezing or rales.   Abdominal:      General: Bowel sounds are normal.      Palpations: Abdomen is soft.   Musculoskeletal:         General: Normal range of motion.      Cervical back: Normal range of motion.      Comments: Left amputation    Skin:     General: Skin is warm.      Coloration: Skin is not pale.      Comments: Sacral, right heel, amputation site with wounds   Neurological:      General: No focal deficit present.      Mental Status: She is alert  and oriented to person, place, and time. Mental status is at baseline.   Psychiatric:         Behavior: Behavior normal.       Vents:  Oxygen Concentration (%): 35 (09/09/22 0230)    Lines/Drains/Airways       Central Venous Catheter Line  Duration                  Hemodialysis Catheter left subclavian -- days              Peripheral Intravenous Line  Duration                  Peripheral IV - Single Lumen 09/06/22 0945 20 G Left;Posterior Hand 2 days                    Significant Labs:    CBC/Anemia Profile:  Recent Labs   Lab 09/09/22  0907   WBC 14.24*   HGB 7.6*   HCT 24.8*      MCV 98.4*   RDW 18.1*          Chemistries:  No results for input(s): NA, K, CL, CO2, BUN, CREATININE, CALCIUM, ALBUMIN, PROT, BILITOT, ALKPHOS, ALT, AST, GLUCOSE, MG, PHOS in the last 48 hours.      All pertinent labs within the past 24 hours have been reviewed.    Significant Imaging:  I have reviewed all pertinent imaging results/findings within the past 24 hours.

## 2022-09-10 LAB
GLUCOSE SERPL-MCNC: 145 MG/DL (ref 70–105)
GLUCOSE SERPL-MCNC: 175 MG/DL (ref 70–105)
GLUCOSE SERPL-MCNC: 189 MG/DL (ref 70–105)
GLUCOSE SERPL-MCNC: 224 MG/DL (ref 70–105)

## 2022-09-10 PROCEDURE — 25000003 PHARM REV CODE 250: Performed by: NURSE PRACTITIONER

## 2022-09-10 PROCEDURE — 99900035 HC TECH TIME PER 15 MIN (STAT)

## 2022-09-10 PROCEDURE — 99232 PR SUBSEQUENT HOSPITAL CARE,LEVL II: ICD-10-PCS | Mod: ,,, | Performed by: FAMILY MEDICINE

## 2022-09-10 PROCEDURE — 25000003 PHARM REV CODE 250: Performed by: FAMILY MEDICINE

## 2022-09-10 PROCEDURE — 27000940

## 2022-09-10 PROCEDURE — 94761 N-INVAS EAR/PLS OXIMETRY MLT: CPT

## 2022-09-10 PROCEDURE — 27000221 HC OXYGEN, UP TO 24 HOURS

## 2022-09-10 PROCEDURE — 63600175 PHARM REV CODE 636 W HCPCS: Performed by: FAMILY MEDICINE

## 2022-09-10 PROCEDURE — 96372 THER/PROPH/DIAG INJ SC/IM: CPT

## 2022-09-10 PROCEDURE — 27000952

## 2022-09-10 PROCEDURE — 11000008

## 2022-09-10 PROCEDURE — 97535 SELF CARE MNGMENT TRAINING: CPT

## 2022-09-10 PROCEDURE — 94660 CPAP INITIATION&MGMT: CPT

## 2022-09-10 PROCEDURE — 82962 GLUCOSE BLOOD TEST: CPT

## 2022-09-10 PROCEDURE — 99900031 HC PATIENT EDUCATION (STAT)

## 2022-09-10 PROCEDURE — 99232 SBSQ HOSP IP/OBS MODERATE 35: CPT | Mod: ,,, | Performed by: FAMILY MEDICINE

## 2022-09-10 RX ADMIN — HYDROCORTISONE ACETATE 25 MG: 25 SUPPOSITORY RECTAL at 08:09

## 2022-09-10 RX ADMIN — LEVOTHYROXINE SODIUM 100 MCG: 100 TABLET ORAL at 06:09

## 2022-09-10 RX ADMIN — APIXABAN 2.5 MG: 2.5 TABLET, FILM COATED ORAL at 08:09

## 2022-09-10 RX ADMIN — Medication 3 MG: at 08:09

## 2022-09-10 RX ADMIN — ATORVASTATIN CALCIUM 40 MG: 40 TABLET, FILM COATED ORAL at 08:09

## 2022-09-10 RX ADMIN — HYDROCODONE BITARTRATE AND ACETAMINOPHEN 1 TABLET: 10; 325 TABLET ORAL at 04:09

## 2022-09-10 RX ADMIN — MEGESTROL ACETATE 40 MG: 40 TABLET ORAL at 06:09

## 2022-09-10 RX ADMIN — HYDROXYZINE PAMOATE 25 MG: 25 CAPSULE ORAL at 05:09

## 2022-09-10 RX ADMIN — FOLIC ACID 1000 MCG: 1 TABLET ORAL at 06:09

## 2022-09-10 RX ADMIN — MIDODRINE HYDROCHLORIDE 10 MG: 5 TABLET ORAL at 02:09

## 2022-09-10 RX ADMIN — HYDROCODONE BITARTRATE AND ACETAMINOPHEN 1 TABLET: 10; 325 TABLET ORAL at 02:09

## 2022-09-10 RX ADMIN — INSULIN ASPART 2 UNITS: 100 INJECTION, SOLUTION INTRAVENOUS; SUBCUTANEOUS at 12:09

## 2022-09-10 RX ADMIN — INSULIN DETEMIR 10 UNITS: 100 INJECTION, SOLUTION SUBCUTANEOUS at 09:09

## 2022-09-10 RX ADMIN — Medication 5000 UNITS: at 08:09

## 2022-09-10 RX ADMIN — MIDODRINE HYDROCHLORIDE 10 MG: 5 TABLET ORAL at 08:09

## 2022-09-10 RX ADMIN — PANTOPRAZOLE SODIUM 40 MG: 40 TABLET, DELAYED RELEASE ORAL at 08:09

## 2022-09-10 RX ADMIN — ESCITALOPRAM OXALATE 10 MG: 10 TABLET ORAL at 08:09

## 2022-09-10 RX ADMIN — ALLOPURINOL 100 MG: 100 TABLET ORAL at 08:09

## 2022-09-10 RX ADMIN — GABAPENTIN 200 MG: 100 CAPSULE ORAL at 08:09

## 2022-09-10 RX ADMIN — ASPIRIN 81 MG: 81 TABLET, COATED ORAL at 08:09

## 2022-09-10 RX ADMIN — HYDROXYZINE PAMOATE 25 MG: 25 CAPSULE ORAL at 02:09

## 2022-09-10 NOTE — HOSPITAL COURSE
Principal Problem:Infection of amputation stump     Chief Complaint:       Chief Complaint   Patient presents with    Wound Infection         HPI: 67yo admitted to [facility] from [facility]. Patient with infected left amputation stump, right heel ulcer and sacral ulcer. Wound culture reveals E.Coli and Proteus both of which are sensitive to Azactam. Patient previously on Midodrine outpatient for hypotension. This has been continued on this in [facility] and will continue in [facility]. Patient with ESRD and will continue her Dialysis of TTS. Patient on Eliquis for atrial fibrillation. Patient with ROXANN and uses CPAP at home             Past Medical History:   Diagnosis Date    A-fib      Blind right eye      Diabetes mellitus      ESRD (end stage renal disease) on dialysis      GERD (gastroesophageal reflux disease)      Gout, unspecified      Heart attack      HTN (hypertension)      Kidney failure      Neuropathy                 Past Surgical History:   Procedure Laterality Date    CHOLECYSTECTOMY        HYSTERECTOMY        LEG AMPUTATION THROUGH KNEE Left                Review of patient's allergies indicates:   Allergen Reactions    Milk containing products      Pcn [penicillins]               Current Facility-Administered Medications on File Prior to Encounter   Medication    0.9%  NaCl infusion    allopurinoL tablet 100 mg    apixaban tablet 2.5 mg    aspirin EC tablet 81 mg    atorvastatin tablet 40 mg    aztreonam (AZACTAM) 1,000 mg in dextrose 5 % in water (D5W) 5 % 50 mL IVPB (MB+)    bisacodyL EC tablet 10 mg    cholecalciferol (vitamin D3) 125 mcg (5,000 unit) tablet 5,000 Units    collagenase ointment    dextromethorphan-guaiFENesin  mg/5 ml liquid 10 mL    dextrose 50% injection 12.5 g    dextrose 50% injection 25 g    EScitalopram oxalate tablet 10 mg    folic acid tablet 1,000 mcg    gabapentin capsule 200 mg    glucagon (human recombinant) injection 1 mg    heparin  (porcine) injection 4,000 Units    HYDROcodone-acetaminophen  mg per tablet 1 tablet    insulin aspart U-100 injection 0-5 Units    insulin aspart U-100 injection 3 Units    insulin detemir U-100 injection 10 Units    levothyroxine tablet 100 mcg    megestroL tablet 40 mg    melatonin tablet 3 mg    midodrine tablet 10 mg    [COMPLETED] midodrine tablet 5 mg    mupirocin 2 % ointment    pantoprazole EC tablet 40 mg    simethicone chewable tablet 80 mg    sodium chloride 0.9% flush 10 mL    traZODone tablet 50 mg    [DISCONTINUED] acetaminophen tablet 1,000 mg    [DISCONTINUED] HYDROcodone-acetaminophen 7.5-325 mg per tablet 1 tablet    [DISCONTINUED] midodrine tablet 2.5 mg    [DISCONTINUED] midodrine tablet 5 mg    [DISCONTINUED] midodrine tablet 5 mg    [DISCONTINUED] ondansetron injection 8 mg    [DISCONTINUED] vancomycin - pharmacy to dose           Current Outpatient Medications on File Prior to Encounter   Medication Sig    allopurinoL (ZYLOPRIM) 100 MG tablet Take 100 mg by mouth once daily.    amLODIPine (NORVASC) 5 MG tablet Take 1 tablet by mouth once daily.    aspirin (ECOTRIN) 81 MG EC tablet Take 81 mg by mouth once daily.    atorvastatin (LIPITOR) 40 MG tablet Take 40 mg by mouth every evening.    enoxaparin (LOVENOX) 120 mg/0.8 mL Syrg Inject 120 mg into the skin once daily.    EScitalopram oxalate (LEXAPRO) 10 MG tablet Take 10 mg by mouth every evening.    folic acid (FOLVITE) 1 MG tablet Take 1,000 mcg by mouth every morning.    gabapentin (NEURONTIN) 100 MG capsule Take 200 mg by mouth every evening.    HYDROcodone-acetaminophen (NORCO) 7.5-325 mg per tablet Take 1 tablet by mouth every 6 (six) hours as needed.    levothyroxine (SYNTHROID) 100 MCG tablet Take 100 mcg by mouth every morning.    megestroL (MEGACE) 40 MG Tab Take 40 mg by mouth every morning.    melatonin (MELATIN) 3 mg tablet Take 3 mg by mouth every evening. Take 2 tablets by mouth at bedtime    NOVOLIN 70/30 U-100 INSULIN  100 unit/mL (70-30) injection Inject 25 Units into the skin 2 (two) times a day.    pantoprazole (PROTONIX) 40 MG tablet Take 40 mg by mouth once daily.    VITAMIN D3 125 mcg (5,000 unit) Tab Take 1 tablet by mouth once daily.    warfarin (COUMADIN) 3 MG tablet 3 mg by Per G Tube route Daily. Take daily Tues through Sunday    warfarin (COUMADIN) 4 MG tablet Take 4 mg by mouth every Monday.    warfarin (COUMADIN) 5 MG tablet Take 5 mg by mouth Daily.      Family History         Problem Relation (Age of Onset)     Cancer Brother     Cirrhosis Mother     Colon cancer Brother     Diabetes Father     Hypertension Father                  Tobacco Use    Smoking status: Never    Smokeless tobacco: Never   Substance and Sexual Activity    Alcohol use: Never    Drug use: Never    Sexual activity: Not on file      Review of Systems   Constitutional:  Negative for fatigue.   Respiratory:  Positive for apnea. Negative for shortness of breath and wheezing.    Cardiovascular:  Negative for chest pain.   Gastrointestinal:  Positive for abdominal distention.   Musculoskeletal:  Positive for arthralgias.     09/10 Patient asking to go home. Oxygen nasal cannula during the day. BIPAP hs. Patient with ROXANN and doesn't have CPAP at home yet. ESRD with dialysis MWF. Left AKA. Blood pressure waxes and wanes depending on patient's mood. /80. Midodrine tid. IV antibiotics discontinued  09/11 Patient stated on yesterday that she wants to go home. She even said she would sign AMA papers to go home. She agreed  to deferring until arrangements for CPAP at home. Met with patient and her daughter, Barbi on yesterday with Elaina Zamora RN and Renea Charlton RN Case manager. The conversation went bad with patient threatening to have me shot if she was not able to go home by Monday, 09/12. Her dialysis schedule is MWF. Continue to monitor BP along with BIPAP  hs.   09/12 - records reviewed.  Patient presented with scattered wounds worse  involves her right hip.  Has left AKA.  Nonambulatory.  Lives at home with family caring for her in the past.  Patient is wanting to go home.  Daughter states cannot care for her and wants eventually long-term placement.  Patient being set up for home trilogy machine but she states she had had CPAP under direction Dr. Collins in the past.  On MWF dialysis; has had dialysis today.  Has completed IV antibiotic.  Apparently had refused surgery on right heel.  Will have Dr. Hanson review.  Previously been followed outpatient at [facility].   PMHx: HTN, blind right eye, A-Fib ( when seen), DM T2, GERD, CAD and non ambulatory which she states is from diabetic neuropathy.  Will discuss with .   09/13- no new issues.  Patient wanting to go home.  Family states they can not care for home and looking at long-term placement.  Nursing staff states she is having frequent pain complaints with wounds.  Patient states she is willing to have her foot amputated but wants it done at [facility].  I have told her that leaving her right foot on will just get her sick down the road.     09/14- patient very much wanted to go home.  Patient's discussed with her family.  Family now agreeable to taking patient home.  There were caring for her previous.  Will get home health to help.  Patient to follow up [facility].  Patient states she is agreeable to have AKA on right leg but wished for it to be done by her wound Dr. [facility].  Will have her follow up with primary care to follow her blood pressure blood sugar.  She will continue with dialysis as before.  Had dialysis today for discharge. discharged home.  She has O2 at 2 L by nasal cannula at home already.  Home ventilator is to be delivered today.

## 2022-09-10 NOTE — ASSESSMENT & PLAN NOTE
Broad spectrum antitibiotics.  The condition is about the same. The patient is getting antibiotics  9/10/2022  PVD, photo documentation noted.

## 2022-09-10 NOTE — PLAN OF CARE
CM consulted for bipap or trilogy. Choice obtained for [facility], sent referral and order to silvia at [facility] and will follow up if able to obtain over weekend. Will follow.

## 2022-09-10 NOTE — SUBJECTIVE & OBJECTIVE
Interval History: The patient is resting.  She was able to eat most of her breakfast this morning.  Her blood pressure is better.  The patient continues to insist on being discharged.    Review of patient's allergies indicates:   Allergen Reactions    Milk containing products     Pcn [penicillins]      Current Facility-Administered Medications   Medication Frequency    0.9%  NaCl infusion PRN    acetaminophen tablet 650 mg Q4H PRN    allopurinoL tablet 100 mg Daily    apixaban tablet 2.5 mg BID    aspirin EC tablet 81 mg Daily    atorvastatin tablet 40 mg QHS    bisacodyL EC tablet 10 mg Daily PRN    collagenase ointment Daily PRN    dextromethorphan-guaiFENesin  mg/5 ml liquid 10 mL Q6H PRN    dextrose 50% injection 12.5 g PRN    dextrose 50% injection 25 g PRN    EScitalopram oxalate tablet 10 mg QHS    folic acid tablet 1,000 mcg QAM    gabapentin capsule 200 mg QHS    glucagon (human recombinant) injection 1 mg PRN    heparin (porcine) injection 4,000 Units PRN    HYDROcodone-acetaminophen  mg per tablet 1 tablet Q8H PRN    hydrocortisone suppository 25 mg BID    hydrOXYzine pamoate capsule 25 mg Q6H PRN    insulin aspart U-100 injection 0-5 Units QID (AC + HS) PRN    insulin detemir U-100 injection 10 Units QHS    levothyroxine tablet 100 mcg QAM    megestroL tablet 40 mg QAM    melatonin tablet 3 mg QHS    midodrine tablet 10 mg TID    ondansetron injection 4 mg Q8H PRN    pantoprazole EC tablet 40 mg Daily    simethicone chewable tablet 80 mg TID PRN    sodium chloride 0.9% flush 10 mL Q12H PRN    traZODone tablet 50 mg Nightly PRN    vitamin D 1000 units tablet 5,000 Units Daily       Objective:     Vital Signs (Most Recent):  Temp: 98.9 °F (37.2 °C) (09/10/22 1100)  Pulse: 99 (09/10/22 1100)  Resp: 12 (09/10/22 1100)  BP: 110/89 (09/10/22 1100)  SpO2: 100 % (09/10/22 1100)  O2 Device (Oxygen Therapy): nasal cannula w/ humidification (09/09/22 1932) Vital Signs (24h Range):  Temp:  [97.8 °F (36.6  °C)-99.1 °F (37.3 °C)] 98.9 °F (37.2 °C)  Pulse:  [] 99  Resp:  [10-20] 12  SpO2:  [99 %-100 %] 100 %  BP: ()/() 110/89     Weight: 130.9 kg (288 lb 9.3 oz) (09/08/22 0600)  Body mass index is 51.12 kg/m².  Body surface area is 2.41 meters squared.    I/O last 3 completed shifts:  In: 340 [P.O.:340]  Out: 985 [Other:985]    Physical Exam  Vitals reviewed.   Constitutional:       Appearance: She is obese.   HENT:      Head: Normocephalic.   Cardiovascular:      Rate and Rhythm: Regular rhythm.   Pulmonary:      Effort: Pulmonary effort is normal.   Abdominal:      Palpations: Abdomen is soft.   Musculoskeletal:      Right lower leg: Edema present.   Skin:     Comments: PVD   Neurological:      Mental Status: She is alert.       Significant Labs:  BMP:   Recent Labs   Lab 09/03/22  1834 09/05/22  1012 09/09/22  0907   GLU 89   < > 205*   *   < > 138   K 4.8   < > 4.5      < > 102   CO2 22   < > 25   BUN 24*   < > 32*   CREATININE 5.45*   < > 5.07*   CALCIUM 8.7   < > 9.0   MG 1.8  --   --     < > = values in this interval not displayed.     CBC:   Recent Labs   Lab 09/09/22  0907   WBC 14.24*   RBC 2.52*   HGB 7.6*   HCT 24.8*      MCV 98.4*   MCH 30.2   MCHC 30.6*        Significant Imaging:  Labs: Reviewed

## 2022-09-10 NOTE — ASSESSMENT & PLAN NOTE
Dialysis TTS  Continue with scheduled hemodialysis.  9/7/2022  Dialysis as scheduled.  9/8/2022 At this time, continue with scheduled dialysis.  9/9/2022  Dialysis as scheduled  9/10/2022  Continue current treatment

## 2022-09-10 NOTE — SUBJECTIVE & OBJECTIVE
Interval History: Patient asking to go home    Review of Systems   Respiratory:  Negative for shortness of breath and wheezing.    Cardiovascular:  Negative for chest pain.   Gastrointestinal:  Negative for abdominal pain.   Objective:     Vital Signs (Most Recent):  Temp: 97.8 °F (36.6 °C) (09/10/22 0700)  Pulse: 107 (09/10/22 0700)  Resp: 12 (09/10/22 0700)  BP: 113/80 (09/10/22 0700)  SpO2: 100 % (09/10/22 0700) Vital Signs (24h Range):  Temp:  [97.8 °F (36.6 °C)-99.1 °F (37.3 °C)] 97.8 °F (36.6 °C)  Pulse:  [] 107  Resp:  [10-20] 12  SpO2:  [91 %-100 %] 100 %  BP: ()/() 113/80     Weight: 130.9 kg (288 lb 9.3 oz)  Body mass index is 51.12 kg/m².    Intake/Output Summary (Last 24 hours) at 9/10/2022 0840  Last data filed at 9/10/2022 0600  Gross per 24 hour   Intake 340 ml   Output 985 ml   Net -645 ml      Physical Exam  Vitals and nursing note reviewed.   Constitutional:       Appearance: She is obese.   Cardiovascular:      Rate and Rhythm: Regular rhythm.      Heart sounds: Normal heart sounds.   Pulmonary:      Breath sounds: Normal breath sounds.      Comments: Nasal cannula  Abdominal:      Palpations: Abdomen is soft.   Neurological:      Mental Status: She is alert and oriented to person, place, and time.       Significant Labs: All pertinent labs within the past 24 hours have been reviewed.  BMP:   Recent Labs   Lab 09/09/22 0907   *      K 4.5      CO2 25   BUN 32*   CREATININE 5.07*   CALCIUM 9.0     CBC:   Recent Labs   Lab 09/09/22 0907   WBC 14.24*   HGB 7.6*   HCT 24.8*        CMP:   Recent Labs   Lab 09/09/22 0907      K 4.5      CO2 25   *   BUN 32*   CREATININE 5.07*   CALCIUM 9.0   ANIONGAP 16     POCT Glucose: No results for input(s): POCTGLUCOSE in the last 48 hours.    Significant Imaging:  none

## 2022-09-10 NOTE — PROGRESS NOTES
[facility]  Progress Note    Patient Name: Michelle Nunes  MRN: [id number]  Patient Class: IP- Inpatient   Admission Date: 8/27/2022  Length of Stay: 14 days  Attending Physician: Jose Luis Espinosa MD  Primary Care Provider: Primary Doctor No        Subjective:     Principal Problem:Acute hypercapnic respiratory failure        HPI:  65yo admitted to [facility] from [facility]. Patient with infected left amputation stump, right heel ulcer and sacral ulcer. Wound culture reveals E.Coli and Proteus both of which are sensitive to Azactam. Patient previously on Midodrine outpatient for hypotension. This has been continued on this in [facility] and will continue in [facility]. Patient with ESRD and will continue her Dialysis of TTS. Patient on Eliquis for atrial fibrillation. Patient with ROXANN and uses CPAP at home      Overview/Hospital Course:  09/10 Patient asking to go home. Oxygen nasal cannula during the day. BIPAP hs. Patient with ROXANN and doesn't have CPAP at home yet. ESRD with dialysis MWF. Left AKA. Blood pressure waxes and wanes depending on patient's mood. /80. Midodrine tid. IV antibiotics discontinued      Interval History: Patient asking to go home    Review of Systems   Respiratory:  Negative for shortness of breath and wheezing.    Cardiovascular:  Negative for chest pain.   Gastrointestinal:  Negative for abdominal pain.   Objective:     Vital Signs (Most Recent):  Temp: 97.8 °F (36.6 °C) (09/10/22 0700)  Pulse: 107 (09/10/22 0700)  Resp: 12 (09/10/22 0700)  BP: 113/80 (09/10/22 0700)  SpO2: 100 % (09/10/22 0700) Vital Signs (24h Range):  Temp:  [97.8 °F (36.6 °C)-99.1 °F (37.3 °C)] 97.8 °F (36.6 °C)  Pulse:  [] 107  Resp:  [10-20] 12  SpO2:  [91 %-100 %] 100 %  BP: ()/() 113/80     Weight: 130.9 kg (288 lb 9.3 oz)  Body mass index is 51.12 kg/m².    Intake/Output Summary (Last 24 hours) at 9/10/2022  0840  Last data filed at 9/10/2022 0600  Gross per 24 hour   Intake 340 ml   Output 985 ml   Net -645 ml      Physical Exam  Vitals and nursing note reviewed.   Constitutional:       Appearance: She is obese.   Cardiovascular:      Rate and Rhythm: Regular rhythm.      Heart sounds: Normal heart sounds.   Pulmonary:      Breath sounds: Normal breath sounds.      Comments: Nasal cannula  Abdominal:      Palpations: Abdomen is soft.   Neurological:      Mental Status: She is alert and oriented to person, place, and time.       Significant Labs: All pertinent labs within the past 24 hours have been reviewed.  BMP:   Recent Labs   Lab 09/09/22  0907   *      K 4.5      CO2 25   BUN 32*   CREATININE 5.07*   CALCIUM 9.0     CBC:   Recent Labs   Lab 09/09/22 0907   WBC 14.24*   HGB 7.6*   HCT 24.8*        CMP:   Recent Labs   Lab 09/09/22 0907      K 4.5      CO2 25   *   BUN 32*   CREATININE 5.07*   CALCIUM 9.0   ANIONGAP 16     POCT Glucose: No results for input(s): POCTGLUCOSE in the last 48 hours.    Significant Imaging:  none      Assessment/Plan:      Hypotension  Midodrine      Infection of amputation stump  IV Azactam      ESRD on dialysis  TTS        VTE Risk Mitigation (From admission, onward)         Ordered     apixaban tablet 2.5 mg  2 times daily         08/27/22 1431     Place sequential compression device  Until discontinued         08/27/22 1431     heparin (porcine) injection 4,000 Units  Use PRN         08/27/22 1431                Discharge Planning   JUN:      Code Status: Full Code   Is the patient medically ready for discharge?:     Reason for patient still in hospital (select all that apply): Other (specify) Manage BP, Oxygen support, dialysis  Discharge Plan A: Skilled Nursing Facility                  Jose Luis Espinosa MD  Department of Hospital Medicine   [facility]

## 2022-09-10 NOTE — PT/OT/SLP PROGRESS
Occupational Therapy   Treatment    Name: Michelle Nunes  MRN: [id number]  Admitting Diagnosis:  Acute hypercapnic respiratory failure       Recommendations:     Discharge Recommendations: nursing facility, skilled, home with home health, intermediate care facility/nursing home  Discharge Equipment Recommendations:  none  Barriers to discharge:  None    Assessment:     Michelle Nunes is a 66 y.o. female with a medical diagnosis of Acute hypercapnic respiratory failure.  She presents sleepy, but easily awakened, per JAY Delaney, pt did not sleep much last night. Performance deficits affecting function are weakness, impaired endurance, impaired self care skills, impaired cognition, decreased upper extremity function.     Rehab Prognosis:  Fair and Poor; patient would benefit from acute skilled OT services to address these deficits and reach maximum level of function.       Plan:     Patient to be seen 5 x/week to address the above listed problems via self-care/home management, therapeutic activities, therapeutic exercises  Plan of Care Expires: 09/12/22  Plan of Care Reviewed with: patient    Subjective     Pain/Comfort:  Pain Rating 1: 4/10  Location - Side 1: Bilateral  Location - Orientation 1: lower  Location 1: abdomen  Pain Addressed 1: Nurse notified, Distraction, Cessation of Activity  Pain Rating Post-Intervention 1: 3/10    Objective:     Communicated with: JAY Zamora prior to session.  Patient found HOB elevated with telemetry, peripheral IV, blood pressure cuff, pulse ox (continuous), oxygen upon OT entry to room.    General Precautions: Standard, fall, respiratory   Orthopedic Precautions:N/A   Braces: N/A  Respiratory Status: Nasal cannula, flow 3 L/min     Occupational Performance:     Bed Mobility:    Patient completed Rolling/Turning to Left with  moderate assistance  Patient completed Rolling/Turning to Right with moderate assistance     Functional Mobility/Transfers:  NT    Activities of  Daily Living:  Grooming: Pt washed her face with min(A) and full setup. Pt performed oral care with sponge swab with moderate hand over hand assist. Pt applied lotion to (B) UEs with mod/ max(A) Pt has limited reach with (B) UEs and decreased  with (B) hands. Pt continues to have some myoclonic jerking      AMPAC 6 Click ADL: 9    Treatment & Education:  Pt performed (B) UE AAROM ex for shoulder flexion, shoulder horizontal abd/adduction, and elbow flexion/ extension for 15 reps of each. Pt was getting too drowsy with AAROM, so therapist encouraged grooming to help to engage pt more.    Patient left  tilted to (R) with HOB elevated  with all lines intact, call button in reach, and RN Elaina notified    GOALS:   Multidisciplinary Problems       Occupational Therapy Goals          Problem: Occupational Therapy    Goal Priority Disciplines Outcome Interventions   Occupational Therapy Goal     OT, PT/OT Ongoing, Progressing    Description: STG:  Pt will perform grooming with setup  Pt will bathe with setup and min(A) for upper body anterior  Pt will perform UE dressing with setup  Pt will perform self- feeding with setup  Pt will perform HEP independently  Pt will tolerate 20 minutes of tx without fatigue      LT.Restore to max I with self care and mobility.                          Time Tracking:     OT Date of Treatment: 09/10/22  OT Start Time: 1324  OT Stop Time: 1340  OT Total Time (min): 16 min    Billable Minutes:Self Care/Home Management 16 min    OT/TARYN: OT          9/10/2022

## 2022-09-10 NOTE — PROGRESS NOTES
[facility]  Nephrology  Progress Note    Patient Name: Michelle Nunes  MRN: [id number]  Admission Date: 8/27/2022  Hospital Length of Stay: 14 days  Attending Provider: Jose Luis Espinosa MD   Primary Care Physician: Primary Doctor No  Principal Problem:Acute hypercapnic respiratory failure    Subjective:     HPI: This patient is known to this nephrology service from a previous consult.  She has a history of ESRD due to HTN and DM.  She has a history of PVD and is s/p left AKA.  The patient presented with a wound on the right foot.  She has required broad spectrum antibiotics.  She has been transferred to Specialty for further care.  She is on pressor medications.      Interval History: The patient is resting.  She was able to eat most of her breakfast this morning.  Her blood pressure is better.  The patient continues to insist on being discharged.    Review of patient's allergies indicates:   Allergen Reactions    Milk containing products     Pcn [penicillins]      Current Facility-Administered Medications   Medication Frequency    0.9%  NaCl infusion PRN    acetaminophen tablet 650 mg Q4H PRN    allopurinoL tablet 100 mg Daily    apixaban tablet 2.5 mg BID    aspirin EC tablet 81 mg Daily    atorvastatin tablet 40 mg QHS    bisacodyL EC tablet 10 mg Daily PRN    collagenase ointment Daily PRN    dextromethorphan-guaiFENesin  mg/5 ml liquid 10 mL Q6H PRN    dextrose 50% injection 12.5 g PRN    dextrose 50% injection 25 g PRN    EScitalopram oxalate tablet 10 mg QHS    folic acid tablet 1,000 mcg QAM    gabapentin capsule 200 mg QHS    glucagon (human recombinant) injection 1 mg PRN    heparin (porcine) injection 4,000 Units PRN    HYDROcodone-acetaminophen  mg per tablet 1 tablet Q8H PRN    hydrocortisone suppository 25 mg BID    hydrOXYzine pamoate capsule 25 mg Q6H PRN    insulin aspart U-100 injection 0-5 Units QID (AC + HS) PRN    insulin  detemir U-100 injection 10 Units QHS    levothyroxine tablet 100 mcg QAM    megestroL tablet 40 mg QAM    melatonin tablet 3 mg QHS    midodrine tablet 10 mg TID    ondansetron injection 4 mg Q8H PRN    pantoprazole EC tablet 40 mg Daily    simethicone chewable tablet 80 mg TID PRN    sodium chloride 0.9% flush 10 mL Q12H PRN    traZODone tablet 50 mg Nightly PRN    vitamin D 1000 units tablet 5,000 Units Daily       Objective:     Vital Signs (Most Recent):  Temp: 98.9 °F (37.2 °C) (09/10/22 1100)  Pulse: 99 (09/10/22 1100)  Resp: 12 (09/10/22 1100)  BP: 110/89 (09/10/22 1100)  SpO2: 100 % (09/10/22 1100)  O2 Device (Oxygen Therapy): nasal cannula w/ humidification (09/09/22 1932) Vital Signs (24h Range):  Temp:  [97.8 °F (36.6 °C)-99.1 °F (37.3 °C)] 98.9 °F (37.2 °C)  Pulse:  [] 99  Resp:  [10-20] 12  SpO2:  [99 %-100 %] 100 %  BP: ()/() 110/89     Weight: 130.9 kg (288 lb 9.3 oz) (09/08/22 0600)  Body mass index is 51.12 kg/m².  Body surface area is 2.41 meters squared.    I/O last 3 completed shifts:  In: 340 [P.O.:340]  Out: 985 [Other:985]    Physical Exam  Vitals reviewed.   Constitutional:       Appearance: She is obese.   HENT:      Head: Normocephalic.   Cardiovascular:      Rate and Rhythm: Regular rhythm.   Pulmonary:      Effort: Pulmonary effort is normal.   Abdominal:      Palpations: Abdomen is soft.   Musculoskeletal:      Right lower leg: Edema present.   Skin:     Comments: PVD   Neurological:      Mental Status: She is alert.       Significant Labs:  BMP:   Recent Labs   Lab 09/03/22  1834 09/05/22  1012 09/09/22  0907   GLU 89   < > 205*   *   < > 138   K 4.8   < > 4.5      < > 102   CO2 22   < > 25   BUN 24*   < > 32*   CREATININE 5.45*   < > 5.07*   CALCIUM 8.7   < > 9.0   MG 1.8  --   --     < > = values in this interval not displayed.     CBC:   Recent Labs   Lab 09/09/22  0907   WBC 14.24*   RBC 2.52*   HGB 7.6*   HCT 24.8*      MCV 98.4*   MCH  30.2   [facility] 30.6*        Significant Imaging:  Labs: Reviewed    Assessment/Plan:     Type 2 diabetes mellitus  Chronic condition  Continue with current management    ESRD on dialysis  Dialysis TTS  Continue with scheduled hemodialysis.  9/7/2022  Dialysis as scheduled.  9/8/2022 At this time, continue with scheduled dialysis.  9/9/2022  Dialysis as scheduled  9/10/2022  Continue current treatment    Diabetic ulcer of right heel associated with type 2 diabetes mellitus  Broad spectrum antitibiotics.  The condition is about the same. The patient is getting antibiotics  9/10/2022  PVD, photo documentation noted.        Thank you for your consult. I will follow-up with patient. Please contact us if you have any additional questions.    Jef Romero Jr, MD  Nephrology  [facility]

## 2022-09-11 LAB
GLUCOSE SERPL-MCNC: 153 MG/DL (ref 70–105)
GLUCOSE SERPL-MCNC: 185 MG/DL (ref 70–105)
GLUCOSE SERPL-MCNC: 200 MG/DL (ref 70–105)
GLUCOSE SERPL-MCNC: 220 MG/DL (ref 70–105)

## 2022-09-11 PROCEDURE — 25000003 PHARM REV CODE 250: Performed by: FAMILY MEDICINE

## 2022-09-11 PROCEDURE — 27000221 HC OXYGEN, UP TO 24 HOURS

## 2022-09-11 PROCEDURE — 63600175 PHARM REV CODE 636 W HCPCS: Performed by: FAMILY MEDICINE

## 2022-09-11 PROCEDURE — 27000952

## 2022-09-11 PROCEDURE — 99900035 HC TECH TIME PER 15 MIN (STAT)

## 2022-09-11 PROCEDURE — 27000940

## 2022-09-11 PROCEDURE — 99232 SBSQ HOSP IP/OBS MODERATE 35: CPT | Mod: ,,, | Performed by: FAMILY MEDICINE

## 2022-09-11 PROCEDURE — 82962 GLUCOSE BLOOD TEST: CPT

## 2022-09-11 PROCEDURE — 99232 PR SUBSEQUENT HOSPITAL CARE,LEVL II: ICD-10-PCS | Mod: ,,, | Performed by: FAMILY MEDICINE

## 2022-09-11 PROCEDURE — 94660 CPAP INITIATION&MGMT: CPT

## 2022-09-11 PROCEDURE — 11000008

## 2022-09-11 RX ADMIN — HYDROCORTISONE ACETATE 25 MG: 25 SUPPOSITORY RECTAL at 08:09

## 2022-09-11 RX ADMIN — Medication 3 MG: at 08:09

## 2022-09-11 RX ADMIN — MIDODRINE HYDROCHLORIDE 10 MG: 5 TABLET ORAL at 08:09

## 2022-09-11 RX ADMIN — HYDROCODONE BITARTRATE AND ACETAMINOPHEN 1 TABLET: 10; 325 TABLET ORAL at 03:09

## 2022-09-11 RX ADMIN — MIDODRINE HYDROCHLORIDE 10 MG: 5 TABLET ORAL at 03:09

## 2022-09-11 RX ADMIN — MEGESTROL ACETATE 40 MG: 40 TABLET ORAL at 06:09

## 2022-09-11 RX ADMIN — FOLIC ACID 1000 MCG: 1 TABLET ORAL at 06:09

## 2022-09-11 RX ADMIN — APIXABAN 2.5 MG: 2.5 TABLET, FILM COATED ORAL at 08:09

## 2022-09-11 RX ADMIN — Medication 5000 UNITS: at 08:09

## 2022-09-11 RX ADMIN — INSULIN DETEMIR 10 UNITS: 100 INJECTION, SOLUTION SUBCUTANEOUS at 08:09

## 2022-09-11 RX ADMIN — GABAPENTIN 200 MG: 100 CAPSULE ORAL at 08:09

## 2022-09-11 RX ADMIN — LEVOTHYROXINE SODIUM 100 MCG: 100 TABLET ORAL at 06:09

## 2022-09-11 RX ADMIN — ASPIRIN 81 MG: 81 TABLET, COATED ORAL at 08:09

## 2022-09-11 RX ADMIN — ATORVASTATIN CALCIUM 40 MG: 40 TABLET, FILM COATED ORAL at 08:09

## 2022-09-11 RX ADMIN — HYDROXYZINE PAMOATE 25 MG: 25 CAPSULE ORAL at 08:09

## 2022-09-11 RX ADMIN — ESCITALOPRAM OXALATE 10 MG: 10 TABLET ORAL at 08:09

## 2022-09-11 RX ADMIN — HYDROCODONE BITARTRATE AND ACETAMINOPHEN 1 TABLET: 10; 325 TABLET ORAL at 06:09

## 2022-09-11 RX ADMIN — ALLOPURINOL 100 MG: 100 TABLET ORAL at 08:09

## 2022-09-11 RX ADMIN — PANTOPRAZOLE SODIUM 40 MG: 40 TABLET, DELAYED RELEASE ORAL at 08:09

## 2022-09-11 NOTE — PROGRESS NOTES
[facility]  Nephrology  Progress Note    Patient Name: Michelle Nunes  MRN: [id number]  Admission Date: 8/27/2022  Hospital Length of Stay: 15 days  Attending Provider: Jose Luis Espinosa MD   Primary Care Physician: Primary Doctor No  Principal Problem:Acute hypercapnic respiratory failure    Subjective:     HPI: This patient is known to this nephrology service from a previous consult.  She has a history of ESRD due to HTN and DM.  She has a history of PVD and is s/p left AKA.  The patient presented with a wound on the right foot.  She has required broad spectrum antibiotics.  She has been transferred to Specialty for further care.  She is on pressor medications.      Interval History: The patient is doing about the same.  She is eager to go home.  She is still very weak.    Review of patient's allergies indicates:   Allergen Reactions    Milk containing products     Pcn [penicillins]      Current Facility-Administered Medications   Medication Frequency    0.9%  NaCl infusion PRN    acetaminophen tablet 650 mg Q4H PRN    allopurinoL tablet 100 mg Daily    apixaban tablet 2.5 mg BID    aspirin EC tablet 81 mg Daily    atorvastatin tablet 40 mg QHS    bisacodyL EC tablet 10 mg Daily PRN    collagenase ointment Daily PRN    dextromethorphan-guaiFENesin  mg/5 ml liquid 10 mL Q6H PRN    dextrose 50% injection 12.5 g PRN    dextrose 50% injection 25 g PRN    EScitalopram oxalate tablet 10 mg QHS    folic acid tablet 1,000 mcg QAM    gabapentin capsule 200 mg QHS    glucagon (human recombinant) injection 1 mg PRN    heparin (porcine) injection 4,000 Units PRN    HYDROcodone-acetaminophen  mg per tablet 1 tablet Q8H PRN    hydrocortisone suppository 25 mg BID    hydrOXYzine pamoate capsule 25 mg Q6H PRN    insulin aspart U-100 injection 0-5 Units QID (AC + HS) PRN    insulin detemir U-100 injection 10 Units QHS    levothyroxine tablet 100 mcg QAM     megestroL tablet 40 mg QAM    melatonin tablet 3 mg QHS    midodrine tablet 10 mg TID    ondansetron injection 4 mg Q8H PRN    pantoprazole EC tablet 40 mg Daily    simethicone chewable tablet 80 mg TID PRN    sodium chloride 0.9% flush 10 mL Q12H PRN    traZODone tablet 50 mg Nightly PRN    vitamin D 1000 units tablet 5,000 Units Daily       Objective:     Vital Signs (Most Recent):  Temp: 97.8 °F (36.6 °C) (09/11/22 0701)  Pulse: 99 (09/11/22 0900)  Resp: 14 (09/11/22 0900)  BP: (!) 79/57 (09/11/22 0900)  SpO2: (!) 94 % (09/11/22 0800)  O2 Device (Oxygen Therapy): BiPAP (09/10/22 1937)   Vital Signs (24h Range):  Temp:  [97.3 °F (36.3 °C)-98.9 °F (37.2 °C)] 97.8 °F (36.6 °C)  Pulse:  [] 99  Resp:  [10-20] 14  SpO2:  [94 %-100 %] 94 %  BP: ()/() 79/57     Weight: 130.9 kg (288 lb 9.3 oz) (09/08/22 0600)  Body mass index is 51.12 kg/m².  Body surface area is 2.41 meters squared.    I/O last 3 completed shifts:  In: 120 [P.O.:120]  Out: 0     Physical Exam  Vitals reviewed.   Constitutional:       Appearance: She is obese.   HENT:      Head: Normocephalic.      Mouth/Throat:      Mouth: Mucous membranes are moist.   Cardiovascular:      Rate and Rhythm: Regular rhythm.   Pulmonary:      Effort: Pulmonary effort is normal.   Abdominal:      Palpations: Abdomen is soft.   Neurological:      Mental Status: She is alert.   Psychiatric:         Mood and Affect: Mood normal.       Significant Labs:  BMP:   Recent Labs   Lab 09/09/22 0907   *      K 4.5      CO2 25   BUN 32*   CREATININE 5.07*   CALCIUM 9.0     CBC:   Recent Labs   Lab 09/09/22 0907   WBC 14.24*   RBC 2.52*   HGB 7.6*   HCT 24.8*      MCV 98.4*   MCH 30.2   MCHC 30.6*        Significant Imaging:  Labs: Reviewed    Assessment/Plan:     Type 2 diabetes mellitus  Chronic condition  Continue with current management    ESRD on dialysis  Dialysis TTS  Continue with scheduled hemodialysis.  9/7/2022  Dialysis  as scheduled.  9/8/2022 At this time, continue with scheduled dialysis.  9/9/2022  Dialysis as scheduled  9/10/2022  Continue current treatment  9/11/2022 No other changes with dialysis treatment.    Diabetic ulcer of right heel associated with type 2 diabetes mellitus  Broad spectrum antitibiotics.  The condition is about the same. The patient is getting antibiotics  9/10/2022  PVD, photo documentation noted.        Thank you for your consult. I will follow-up with patient. Please contact us if you have any additional questions.    Jef Romero Jr, MD  Nephrology  [facility]

## 2022-09-11 NOTE — SUBJECTIVE & OBJECTIVE
Interval History: Patient resting    Review of Systems   Respiratory:  Negative for shortness of breath and wheezing.    Cardiovascular:  Negative for chest pain.   Gastrointestinal:  Negative for abdominal distention.   Objective:     Vital Signs (Most Recent):  Temp: 98.2 °F (36.8 °C) (09/11/22 0400)  Pulse: 107 (09/11/22 0500)  Resp: 20 (09/11/22 0630)  BP: (!) 141/111 (09/11/22 0500)  SpO2: 98 % (09/11/22 0500)   Vital Signs (24h Range):  Temp:  [97.3 °F (36.3 °C)-98.9 °F (37.2 °C)] 98.2 °F (36.8 °C)  Pulse:  [] 107  Resp:  [10-20] 20  SpO2:  [95 %-100 %] 98 %  BP: ()/() 141/111     Weight: 130.9 kg (288 lb 9.3 oz)  Body mass index is 51.12 kg/m².    Intake/Output Summary (Last 24 hours) at 9/11/2022 0644  Last data filed at 9/10/2022 1716  Gross per 24 hour   Intake 120 ml   Output --   Net 120 ml      Physical Exam  Vitals and nursing note reviewed.   Constitutional:       Appearance: She is obese.   Cardiovascular:      Rate and Rhythm: Regular rhythm.      Heart sounds: Normal heart sounds.   Pulmonary:      Breath sounds: No wheezing.   Abdominal:      Palpations: Abdomen is soft.   Musculoskeletal:      Comments: Left AKA   Neurological:      Mental Status: She is alert.       Significant Labs: All pertinent labs within the past 24 hours have been reviewed.  BMP:   Recent Labs   Lab 09/09/22  0907   *      K 4.5      CO2 25   BUN 32*   CREATININE 5.07*   CALCIUM 9.0     CBC:   Recent Labs   Lab 09/09/22 0907   WBC 14.24*   HGB 7.6*   HCT 24.8*        CMP:   Recent Labs   Lab 09/09/22 0907      K 4.5      CO2 25   *   BUN 32*   CREATININE 5.07*   CALCIUM 9.0   ANIONGAP 16     POCT Glucose: No results for input(s): POCTGLUCOSE in the last 48 hours.    Significant Imaging:  none

## 2022-09-11 NOTE — SUBJECTIVE & OBJECTIVE
Interval History: The patient is doing about the same.  She is eager to go home.  She is still very weak.    Review of patient's allergies indicates:   Allergen Reactions    Milk containing products     Pcn [penicillins]      Current Facility-Administered Medications   Medication Frequency    0.9%  NaCl infusion PRN    acetaminophen tablet 650 mg Q4H PRN    allopurinoL tablet 100 mg Daily    apixaban tablet 2.5 mg BID    aspirin EC tablet 81 mg Daily    atorvastatin tablet 40 mg QHS    bisacodyL EC tablet 10 mg Daily PRN    collagenase ointment Daily PRN    dextromethorphan-guaiFENesin  mg/5 ml liquid 10 mL Q6H PRN    dextrose 50% injection 12.5 g PRN    dextrose 50% injection 25 g PRN    EScitalopram oxalate tablet 10 mg QHS    folic acid tablet 1,000 mcg QAM    gabapentin capsule 200 mg QHS    glucagon (human recombinant) injection 1 mg PRN    heparin (porcine) injection 4,000 Units PRN    HYDROcodone-acetaminophen  mg per tablet 1 tablet Q8H PRN    hydrocortisone suppository 25 mg BID    hydrOXYzine pamoate capsule 25 mg Q6H PRN    insulin aspart U-100 injection 0-5 Units QID (AC + HS) PRN    insulin detemir U-100 injection 10 Units QHS    levothyroxine tablet 100 mcg QAM    megestroL tablet 40 mg QAM    melatonin tablet 3 mg QHS    midodrine tablet 10 mg TID    ondansetron injection 4 mg Q8H PRN    pantoprazole EC tablet 40 mg Daily    simethicone chewable tablet 80 mg TID PRN    sodium chloride 0.9% flush 10 mL Q12H PRN    traZODone tablet 50 mg Nightly PRN    vitamin D 1000 units tablet 5,000 Units Daily       Objective:     Vital Signs (Most Recent):  Temp: 97.8 °F (36.6 °C) (09/11/22 0701)  Pulse: 99 (09/11/22 0900)  Resp: 14 (09/11/22 0900)  BP: (!) 79/57 (09/11/22 0900)  SpO2: (!) 94 % (09/11/22 0800)  O2 Device (Oxygen Therapy): BiPAP (09/10/22 1937)   Vital Signs (24h Range):  Temp:  [97.3 °F (36.3 °C)-98.9 °F (37.2 °C)] 97.8 °F (36.6 °C)  Pulse:  [] 99  Resp:  [10-20] 14  SpO2:  [94  %-100 %] 94 %  BP: ()/() 79/57     Weight: 130.9 kg (288 lb 9.3 oz) (09/08/22 0600)  Body mass index is 51.12 kg/m².  Body surface area is 2.41 meters squared.    I/O last 3 completed shifts:  In: 120 [P.O.:120]  Out: 0     Physical Exam  Vitals reviewed.   Constitutional:       Appearance: She is obese.   HENT:      Head: Normocephalic.      Mouth/Throat:      Mouth: Mucous membranes are moist.   Cardiovascular:      Rate and Rhythm: Regular rhythm.   Pulmonary:      Effort: Pulmonary effort is normal.   Abdominal:      Palpations: Abdomen is soft.   Neurological:      Mental Status: She is alert.   Psychiatric:         Mood and Affect: Mood normal.       Significant Labs:  BMP:   Recent Labs   Lab 09/09/22  0907   *      K 4.5      CO2 25   BUN 32*   CREATININE 5.07*   CALCIUM 9.0     CBC:   Recent Labs   Lab 09/09/22  0907   WBC 14.24*   RBC 2.52*   HGB 7.6*   HCT 24.8*      MCV 98.4*   MCH 30.2   MCHC 30.6*        Significant Imaging:  Labs: Reviewed

## 2022-09-11 NOTE — PROGRESS NOTES
[facility] Medicine  Progress Note    Patient Name: Michelle Nunes  MRN: [id number]  Patient Class: IP- Inpatient   Admission Date: 8/27/2022  Length of Stay: 15 days  Attending Physician: Jose Luis Espinosa MD  Primary Care Provider: Primary Doctor No        Subjective:     Principal Problem:Acute hypercapnic respiratory failure        HPI:  65yo admitted to [facility] from [facility]. Patient with infected left amputation stump, right heel ulcer and sacral ulcer. Wound culture reveals E.Coli and Proteus both of which are sensitive to Azactam. Patient previously on Midodrine outpatient for hypotension. This has been continued on this in [facility] and will continue in [facility]. Patient with ESRD and will continue her Dialysis of TTS. Patient on Eliquis for atrial fibrillation. Patient with ROXANN and uses CPAP at home      Overview/Hospital Course:  09/10 Patient asking to go home. Oxygen nasal cannula during the day. BIPAP hs. Patient with ROXANN and doesn't have CPAP at home yet. ESRD with dialysis MWF. Left AKA. Blood pressure waxes and wanes depending on patient's mood. /80. Midodrine tid. IV antibiotics discontinued  09/11 Patient stated on yesterday that she wants to go home. She even said she would sign AMA papers to go home. She agreed  to deferring until arrangements for CPAP at home. Met with patient and her daughter, Barbi on yesterday with Elaina Zamora RN and Renea Charlton RN Case manager. The conversation went bad with patient threatening to have me shot if she was not able to go home by Monday, 09/12. Her dialysis schedule is MWF. Continue to monitor BP along with BIPAP  hs.       Interval History: Patient resting    Review of Systems   Respiratory:  Negative for shortness of breath and wheezing.    Cardiovascular:  Negative for chest pain.   Gastrointestinal:  Negative for abdominal distention.   Objective:     Vital  Signs (Most Recent):  Temp: 98.2 °F (36.8 °C) (09/11/22 0400)  Pulse: 107 (09/11/22 0500)  Resp: 20 (09/11/22 0630)  BP: (!) 141/111 (09/11/22 0500)  SpO2: 98 % (09/11/22 0500)   Vital Signs (24h Range):  Temp:  [97.3 °F (36.3 °C)-98.9 °F (37.2 °C)] 98.2 °F (36.8 °C)  Pulse:  [] 107  Resp:  [10-20] 20  SpO2:  [95 %-100 %] 98 %  BP: ()/() 141/111     Weight: 130.9 kg (288 lb 9.3 oz)  Body mass index is 51.12 kg/m².    Intake/Output Summary (Last 24 hours) at 9/11/2022 0644  Last data filed at 9/10/2022 1716  Gross per 24 hour   Intake 120 ml   Output --   Net 120 ml      Physical Exam  Vitals and nursing note reviewed.   Constitutional:       Appearance: She is obese.   Cardiovascular:      Rate and Rhythm: Regular rhythm.      Heart sounds: Normal heart sounds.   Pulmonary:      Breath sounds: No wheezing.   Abdominal:      Palpations: Abdomen is soft.   Musculoskeletal:      Comments: Left AKA   Neurological:      Mental Status: She is alert.       Significant Labs: All pertinent labs within the past 24 hours have been reviewed.  BMP:   Recent Labs   Lab 09/09/22  0907   *      K 4.5      CO2 25   BUN 32*   CREATININE 5.07*   CALCIUM 9.0     CBC:   Recent Labs   Lab 09/09/22  0907   WBC 14.24*   HGB 7.6*   HCT 24.8*        CMP:   Recent Labs   Lab 09/09/22  0907      K 4.5      CO2 25   *   BUN 32*   CREATININE 5.07*   CALCIUM 9.0   ANIONGAP 16     POCT Glucose: No results for input(s): POCTGLUCOSE in the last 48 hours.    Significant Imaging:  none      Assessment/Plan:      Hypotension  Midodrine      Infection of amputation stump  IV Azactam      ESRD on dialysis  TTS        VTE Risk Mitigation (From admission, onward)         Ordered     apixaban tablet 2.5 mg  2 times daily         08/27/22 1431     Place sequential compression device  Until discontinued         08/27/22 1431     heparin (porcine) injection 4,000 Units  Use PRN         08/27/22  1431                Discharge Planning   JUN:      Code Status: Full Code   Is the patient medically ready for discharge?:     Reason for patient still in hospital (select all that apply): Other (specify) BIPAP hs.   Discharge Plan A: Skilled Nursing Facility                  Jose Luis Espinosa MD  Department of Hospital Medicine   [facility]

## 2022-09-11 NOTE — PLAN OF CARE
Problem: Bariatric Environmental Safety  Goal: Safety Maintained with Care  Outcome: Ongoing, Progressing  Intervention: Promote Safety and Comfort  Flowsheets (Taken 9/11/2022 3679)  Bariatric Safety: specialty bed utilized

## 2022-09-11 NOTE — ASSESSMENT & PLAN NOTE
Dialysis TTS  Continue with scheduled hemodialysis.  9/7/2022  Dialysis as scheduled.  9/8/2022 At this time, continue with scheduled dialysis.  9/9/2022  Dialysis as scheduled  9/10/2022  Continue current treatment  9/11/2022 No other changes with dialysis treatment.

## 2022-09-12 LAB
ANION GAP SERPL CALCULATED.3IONS-SCNC: 12 MMOL/L (ref 7–16)
BASOPHILS # BLD AUTO: 0.06 K/UL (ref 0–0.2)
BASOPHILS NFR BLD AUTO: 0.5 % (ref 0–1)
BUN SERPL-MCNC: 40 MG/DL (ref 7–18)
BUN/CREAT SERPL: 8 (ref 6–20)
CALCIUM SERPL-MCNC: 8.5 MG/DL (ref 8.5–10.1)
CHLORIDE SERPL-SCNC: 100 MMOL/L (ref 98–107)
CO2 SERPL-SCNC: 28 MMOL/L (ref 21–32)
CREAT SERPL-MCNC: 5.31 MG/DL (ref 0.55–1.02)
DIFFERENTIAL METHOD BLD: ABNORMAL
EGFR (NO RACE VARIABLE) (RUSH/TITUS): 8 ML/MIN/1.73M²
EOSINOPHIL # BLD AUTO: 0.06 K/UL (ref 0–0.5)
EOSINOPHIL NFR BLD AUTO: 0.5 % (ref 1–4)
ERYTHROCYTE [DISTWIDTH] IN BLOOD BY AUTOMATED COUNT: 18.1 % (ref 11.5–14.5)
GLUCOSE SERPL-MCNC: 154 MG/DL (ref 70–105)
GLUCOSE SERPL-MCNC: 158 MG/DL (ref 70–105)
GLUCOSE SERPL-MCNC: 165 MG/DL (ref 70–105)
GLUCOSE SERPL-MCNC: 190 MG/DL (ref 74–106)
GLUCOSE SERPL-MCNC: 212 MG/DL (ref 70–105)
HCT VFR BLD AUTO: 23.1 % (ref 38–47)
HGB BLD-MCNC: 7.1 G/DL (ref 12–16)
IMM GRANULOCYTES # BLD AUTO: 0.09 K/UL (ref 0–0.04)
IMM GRANULOCYTES NFR BLD: 0.8 % (ref 0–0.4)
LYMPHOCYTES # BLD AUTO: 0.8 K/UL (ref 1–4.8)
LYMPHOCYTES NFR BLD AUTO: 7 % (ref 27–41)
MCH RBC QN AUTO: 30.3 PG (ref 27–31)
MCHC RBC AUTO-ENTMCNC: 30.7 G/DL (ref 32–36)
MCV RBC AUTO: 98.7 FL (ref 80–96)
MONOCYTES # BLD AUTO: 0.6 K/UL (ref 0–0.8)
MONOCYTES NFR BLD AUTO: 5.3 % (ref 2–6)
MPC BLD CALC-MCNC: 10.6 FL (ref 9.4–12.4)
NEUTROPHILS # BLD AUTO: 9.75 K/UL (ref 1.8–7.7)
NEUTROPHILS NFR BLD AUTO: 85.9 % (ref 53–65)
NRBC # BLD AUTO: 0.04 X10E3/UL
NRBC, AUTO (.00): 0.4 %
PLATELET # BLD AUTO: 273 K/UL (ref 150–400)
POTASSIUM SERPL-SCNC: 4.4 MMOL/L (ref 3.5–5.1)
RBC # BLD AUTO: 2.34 M/UL (ref 4.2–5.4)
SODIUM SERPL-SCNC: 136 MMOL/L (ref 136–145)
WBC # BLD AUTO: 11.36 K/UL (ref 4.5–11)

## 2022-09-12 PROCEDURE — 80100014 HC HEMODIALYSIS 1:1

## 2022-09-12 PROCEDURE — 94660 CPAP INITIATION&MGMT: CPT

## 2022-09-12 PROCEDURE — 85025 COMPLETE CBC W/AUTO DIFF WBC: CPT | Performed by: FAMILY MEDICINE

## 2022-09-12 PROCEDURE — 25000003 PHARM REV CODE 250: Performed by: FAMILY MEDICINE

## 2022-09-12 PROCEDURE — 99223 PR INITIAL HOSPITAL CARE,LEVL III: ICD-10-PCS | Mod: ,,, | Performed by: SURGERY

## 2022-09-12 PROCEDURE — 63600175 PHARM REV CODE 636 W HCPCS: Performed by: FAMILY MEDICINE

## 2022-09-12 PROCEDURE — 99233 PR SUBSEQUENT HOSPITAL CARE,LEVL III: ICD-10-PCS | Mod: ,,, | Performed by: HOSPITALIST

## 2022-09-12 PROCEDURE — 99223 1ST HOSP IP/OBS HIGH 75: CPT | Mod: ,,, | Performed by: SURGERY

## 2022-09-12 PROCEDURE — 27000952

## 2022-09-12 PROCEDURE — 82962 GLUCOSE BLOOD TEST: CPT

## 2022-09-12 PROCEDURE — 36415 COLL VENOUS BLD VENIPUNCTURE: CPT | Performed by: FAMILY MEDICINE

## 2022-09-12 PROCEDURE — 97110 THERAPEUTIC EXERCISES: CPT | Mod: CO

## 2022-09-12 PROCEDURE — 27000940

## 2022-09-12 PROCEDURE — 80048 BASIC METABOLIC PNL TOTAL CA: CPT | Performed by: FAMILY MEDICINE

## 2022-09-12 PROCEDURE — 99233 SBSQ HOSP IP/OBS HIGH 50: CPT | Mod: ,,, | Performed by: HOSPITALIST

## 2022-09-12 PROCEDURE — 27000221 HC OXYGEN, UP TO 24 HOURS

## 2022-09-12 PROCEDURE — 99900035 HC TECH TIME PER 15 MIN (STAT)

## 2022-09-12 PROCEDURE — 11000008

## 2022-09-12 PROCEDURE — 97530 THERAPEUTIC ACTIVITIES: CPT | Mod: CO

## 2022-09-12 RX ADMIN — INSULIN DETEMIR 10 UNITS: 100 INJECTION, SOLUTION SUBCUTANEOUS at 08:09

## 2022-09-12 RX ADMIN — MIDODRINE HYDROCHLORIDE 10 MG: 5 TABLET ORAL at 08:09

## 2022-09-12 RX ADMIN — ASPIRIN 81 MG: 81 TABLET, COATED ORAL at 08:09

## 2022-09-12 RX ADMIN — HYDROCODONE BITARTRATE AND ACETAMINOPHEN 1 TABLET: 10; 325 TABLET ORAL at 02:09

## 2022-09-12 RX ADMIN — HEPARIN SODIUM 4000 UNITS: 1000 INJECTION INTRAVENOUS; SUBCUTANEOUS at 12:09

## 2022-09-12 RX ADMIN — MEGESTROL ACETATE 40 MG: 40 TABLET ORAL at 06:09

## 2022-09-12 RX ADMIN — ALLOPURINOL 100 MG: 100 TABLET ORAL at 08:09

## 2022-09-12 RX ADMIN — MIDODRINE HYDROCHLORIDE 10 MG: 5 TABLET ORAL at 02:09

## 2022-09-12 RX ADMIN — HYDROCORTISONE ACETATE 25 MG: 25 SUPPOSITORY RECTAL at 08:09

## 2022-09-12 RX ADMIN — SODIUM CHLORIDE 2000 ML: 9 INJECTION, SOLUTION INTRAVENOUS at 12:09

## 2022-09-12 RX ADMIN — ATORVASTATIN CALCIUM 40 MG: 40 TABLET, FILM COATED ORAL at 08:09

## 2022-09-12 RX ADMIN — ESCITALOPRAM OXALATE 10 MG: 10 TABLET ORAL at 08:09

## 2022-09-12 RX ADMIN — INSULIN ASPART 2 UNITS: 100 INJECTION, SOLUTION INTRAVENOUS; SUBCUTANEOUS at 05:09

## 2022-09-12 RX ADMIN — LEVOTHYROXINE SODIUM 100 MCG: 100 TABLET ORAL at 06:09

## 2022-09-12 RX ADMIN — FOLIC ACID 1000 MCG: 1 TABLET ORAL at 06:09

## 2022-09-12 RX ADMIN — PANTOPRAZOLE SODIUM 40 MG: 40 TABLET, DELAYED RELEASE ORAL at 08:09

## 2022-09-12 RX ADMIN — APIXABAN 2.5 MG: 2.5 TABLET, FILM COATED ORAL at 08:09

## 2022-09-12 RX ADMIN — Medication 5000 UNITS: at 08:09

## 2022-09-12 RX ADMIN — HYDROCODONE BITARTRATE AND ACETAMINOPHEN 1 TABLET: 10; 325 TABLET ORAL at 06:09

## 2022-09-12 RX ADMIN — GABAPENTIN 200 MG: 100 CAPSULE ORAL at 08:09

## 2022-09-12 RX ADMIN — Medication 3 MG: at 08:09

## 2022-09-12 NOTE — SUBJECTIVE & OBJECTIVE
Interval History: The patient is moaning this afternoon.    Review of patient's allergies indicates:   Allergen Reactions    Milk containing products     Pcn [penicillins]      Current Facility-Administered Medications   Medication Frequency    0.9%  NaCl infusion PRN    acetaminophen tablet 650 mg Q4H PRN    allopurinoL tablet 100 mg Daily    apixaban tablet 2.5 mg BID    aspirin EC tablet 81 mg Daily    atorvastatin tablet 40 mg QHS    bisacodyL EC tablet 10 mg Daily PRN    collagenase ointment Daily PRN    dextromethorphan-guaiFENesin  mg/5 ml liquid 10 mL Q6H PRN    dextrose 50% injection 12.5 g PRN    dextrose 50% injection 25 g PRN    EScitalopram oxalate tablet 10 mg QHS    folic acid tablet 1,000 mcg QAM    gabapentin capsule 200 mg QHS    glucagon (human recombinant) injection 1 mg PRN    heparin (porcine) injection 4,000 Units PRN    HYDROcodone-acetaminophen  mg per tablet 1 tablet Q8H PRN    hydrocortisone suppository 25 mg BID    hydrOXYzine pamoate capsule 25 mg Q6H PRN    insulin aspart U-100 injection 0-5 Units QID (AC + HS) PRN    insulin detemir U-100 injection 10 Units QHS    levothyroxine tablet 100 mcg QAM    megestroL tablet 40 mg QAM    melatonin tablet 3 mg QHS    midodrine tablet 10 mg TID    ondansetron injection 4 mg Q8H PRN    pantoprazole EC tablet 40 mg Daily    simethicone chewable tablet 80 mg TID PRN    sodium chloride 0.9% flush 10 mL Q12H PRN    traZODone tablet 50 mg Nightly PRN    vitamin D 1000 units tablet 5,000 Units Daily       Objective:     Vital Signs (Most Recent):  Temp: 97 °F (36.1 °C) (09/12/22 1131)  Pulse: 108 (09/12/22 1300)  Resp: 13 (09/12/22 1300)  BP: (!) 109/91 (09/12/22 1245)  SpO2: 95 % (09/12/22 1131)  O2 Device (Oxygen Therapy): nasal cannula (09/12/22 0915)   Vital Signs (24h Range):  Temp:  [97 °F (36.1 °C)-98.5 °F (36.9 °C)] 97 °F (36.1 °C)  Pulse:  [] 108  Resp:  [8-28] 13  SpO2:  [68 %-100 %] 95 %  BP: ()/(20-97) 109/91      Weight: 131.2 kg (289 lb 4.8 oz) (09/12/22 0600)  Body mass index is 51.25 kg/m².  Body surface area is 2.41 meters squared.    I/O last 3 completed shifts:  In: 360 [P.O.:360]  Out: -     Physical Exam  Vitals reviewed.   HENT:      Head: Atraumatic.   Cardiovascular:      Rate and Rhythm: Regular rhythm.   Pulmonary:      Effort: Pulmonary effort is normal.   Abdominal:      Palpations: Abdomen is soft.   Neurological:      Mental Status: She is alert.   Psychiatric:         Mood and Affect: Mood normal.       Significant Labs:  BMP:   Recent Labs   Lab 09/12/22  0951   *      K 4.4      CO2 28   BUN 40*   CREATININE 5.31*   CALCIUM 8.5     CBC:   Recent Labs   Lab 09/12/22  0951   WBC 11.36*   RBC 2.34*   HGB 7.1*   HCT 23.1*      MCV 98.7*   MCH 30.3   MCHC 30.7*        Significant Imaging:  Labs: Reviewed

## 2022-09-12 NOTE — ASSESSMENT & PLAN NOTE
Broad spectrum antitibiotics.  The condition is about the same. The patient is getting antibiotics  9/10/2022  PVD, photo documentation noted.  9/12/2022.  Wound care

## 2022-09-12 NOTE — PLAN OF CARE
Ani at [facility] says trilogy has been approved. SS awaiting to see when MD will be ready for discharge.

## 2022-09-12 NOTE — ASSESSMENT & PLAN NOTE
Patient with Permanent atrial fibrillation which is uncontrolled currently with Calcium Channel Blocker. Patient is currently in atrial fibrillation.[id number] Score: 3. HASBLED Score: Unable to calculate. Anticoagulation indicated. Anticoagulation done with eliquis.

## 2022-09-12 NOTE — ASSESSMENT & PLAN NOTE
Patient's FSGs are controlled on current medication regimen.  Last A1c reviewed-   Lab Results   Component Value Date    HGBA1C 5.3 08/25/2022     Most recent fingerstick glucose reviewed- No results for input(s): POCTGLUCOSE in the last 24 hours.  Current correctional scale  Medium  Maintain anti-hyperglycemic dose as follows-   Antihyperglycemics (From admission, onward)    Start     Stop Route Frequency Ordered    09/09/22 2100  insulin detemir U-100 injection 10 Units         -- SubQ Nightly 09/09/22 0948    08/27/22 1430  insulin aspart U-100 injection 0-5 Units         -- SubQ Before meals & nightly PRN 08/27/22 1431        No Oral hypoglycemics with patient's renal failure.    AKA recommended but patient refused

## 2022-09-12 NOTE — SUBJECTIVE & OBJECTIVE
Interval History:     Review of Systems   Constitutional:  Negative for appetite change, fatigue and fever.   HENT:  Negative for congestion, hearing loss and trouble swallowing.    Eyes:  Positive for visual disturbance.   Respiratory:  Positive for shortness of breath. Negative for chest tightness and wheezing.    Cardiovascular:  Negative for chest pain and palpitations.   Gastrointestinal:  Negative for abdominal pain, constipation and nausea.   Genitourinary:  Negative for difficulty urinating and dysuria.   Musculoskeletal:  Positive for gait problem. Negative for back pain and neck stiffness.   Skin:  Positive for wound. Negative for pallor and rash.   Neurological:  Positive for numbness. Negative for dizziness, speech difficulty and headaches.   Psychiatric/Behavioral:  Positive for agitation and sleep disturbance. Negative for confusion and suicidal ideas.    Objective:     Vital Signs (Most Recent):  Temp: 97 °F (36.1 °C) (09/12/22 1131)  Pulse: (!) 119 (09/12/22 1530)  Resp: 15 (09/12/22 1530)  BP: (!) 104/44 (09/12/22 1500)  SpO2: 95 % (09/12/22 1131)   Vital Signs (24h Range):  Temp:  [97 °F (36.1 °C)-98.5 °F (36.9 °C)] 97 °F (36.1 °C)  Pulse:  [] 119  Resp:  [8-28] 15  SpO2:  [68 %-100 %] 95 %  BP: ()/(23-97) 104/44     Weight: 131.2 kg (289 lb 4.8 oz)  Body mass index is 51.25 kg/m².    Intake/Output Summary (Last 24 hours) at 9/12/2022 1535  Last data filed at 9/12/2022 1245  Gross per 24 hour   Intake 500 ml   Output 2500 ml   Net -2000 ml      Physical Exam  Vitals reviewed.   Constitutional:       General: She is not in acute distress.     Appearance: She is morbidly obese.   Eyes:      Pupils: Pupils are equal, round, and reactive to light.   Cardiovascular:      Rate and Rhythm: Tachycardia present. Rhythm irregular.      Pulses: Normal pulses.   Pulmonary:      Effort: Pulmonary effort is normal. No respiratory distress.      Breath sounds: Normal breath sounds. No wheezing.    Abdominal:      General: Bowel sounds are normal. There is no distension.      Tenderness: There is no abdominal tenderness.   Musculoskeletal:        Legs:    Skin:     General: Skin is warm.   Neurological:      General: No focal deficit present.      Mental Status: She is alert, oriented to person, place, and time and easily aroused. Mental status is at baseline.   Psychiatric:         Mood and Affect: Mood normal.         Behavior: Behavior normal.       Significant Labs: All pertinent labs within the past 24 hours have been reviewed.  BMP:   Recent Labs   Lab 09/12/22  0951   *      K 4.4      CO2 28   BUN 40*   CREATININE 5.31*   CALCIUM 8.5     CBC:   Recent Labs   Lab 09/12/22  0951   WBC 11.36*   HGB 7.1*   HCT 23.1*        CMP:   Recent Labs   Lab 09/12/22 0951      K 4.4      CO2 28   *   BUN 40*   CREATININE 5.31*   CALCIUM 8.5   ANIONGAP 12       Significant Imaging: I have reviewed all pertinent imaging results/findings within the past 24 hours.    Intake/Output - Last 3 Shifts         09/10 0700 09/11 0659 09/11 0700 09/12 0659 09/12 0700 09/13 0659    P.O. 120 360 0    Other   500    Total Intake(mL/kg) 120 (0.9) 360 (2.7) 500 (3.8)    Urine (mL/kg/hr)       Other   2500    Stool       Total Output   2500    Net +120 +360 -2000           Stool Occurrence 2 x              Microbiology Results (last 7 days)       ** No results found for the last 168 hours. **

## 2022-09-12 NOTE — ASSESSMENT & PLAN NOTE
Patient's FSGs are controlled on current medication regimen.  Last A1c reviewed-   Lab Results   Component Value Date    HGBA1C 5.3 08/25/2022     Most recent fingerstick glucose reviewed- No results for input(s): POCTGLUCOSE in the last 24 hours.  Current correctional scale  Medium  Maintain anti-hyperglycemic dose as follows-   Antihyperglycemics (From admission, onward)    Start     Stop Route Frequency Ordered    09/09/22 2100  insulin detemir U-100 injection 10 Units         -- SubQ Nightly 09/09/22 0948    08/27/22 1430  insulin aspart U-100 injection 0-5 Units         -- SubQ Before meals & nightly PRN 08/27/22 1431        No Oral hypoglycemics with patient having renal failure.

## 2022-09-12 NOTE — ASSESSMENT & PLAN NOTE
Body mass index is 51.25 kg/m². Morbid obesity complicates all aspects of disease management from diagnostic modalities to treatment. Weight loss encouraged and health benefits explained to patient.

## 2022-09-12 NOTE — PROGRESS NOTES
[facility] Medicine  Progress Note    Patient Name: Michelle Nunes  MRN: [id number]  Patient Class: IP- Inpatient   Admission Date: 8/27/2022  Length of Stay: 16 days  Attending Physician: Jorge Hung MD  Primary Care Provider: Primary Doctor No        Subjective:     Principal Problem:Acute hypercapnic respiratory failure        HPI:  67yo admitted to [facility] from [facility]. Patient with infected left amputation stump, right heel ulcer and sacral ulcer. Wound culture reveals E.Coli and Proteus both of which are sensitive to Azactam. Patient previously on Midodrine outpatient for hypotension. This has been continued on this in [facility] and will continue in [facility]. Patient with ESRD and will continue her Dialysis of TTS. Patient on Eliquis for atrial fibrillation. Patient with ROXANN and uses CPAP at home      Overview/Hospital Course:  09/10 Patient asking to go home. Oxygen nasal cannula during the day. BIPAP hs. Patient with ROXANN and doesn't have CPAP at home yet. ESRD with dialysis MWF. Left AKA. Blood pressure waxes and wanes depending on patient's mood. /80. Midodrine tid. IV antibiotics discontinued  09/11 Patient stated on yesterday that she wants to go home. She even said she would sign AMA papers to go home. She agreed  to deferring until arrangements for CPAP at home. Met with patient and her daughter, Barbi on yesterday with Elaina Zamora RN and Renea Charlton RN Case manager. The conversation went bad with patient threatening to have me shot if she was not able to go home by Monday, 09/12. Her dialysis schedule is MWF. Continue to monitor BP along with BIPAP  hs.   09/12 - records reviewed.  Patient presented with scattered wounds worse involves her right hip.  Has left AKA.  Nonambulatory.  Lives at home with family caring for her in the past.  Patient is wanting to go home.  Daughter states cannot care for her  and wants eventually long-term placement.  Patient being set up for home trilogy machine but she states she had had CPAP under direction Dr. Collins in the past.  On MWF dialysis; has had dialysis today.  Has completed IV antibiotic.  Apparently had refused surgery on right heel.  Will have Dr. Hanson review.  Previously been followed outpatient at [facility].   PMHx: HTN, blind right eye, A-Fib ( when seen), DM T2, GERD, CAD and non ambulatory which she states is from diabetic neuropathy.  Will discuss with .       Interval History:     Review of Systems   Constitutional:  Negative for appetite change, fatigue and fever.   HENT:  Negative for congestion, hearing loss and trouble swallowing.    Eyes:  Positive for visual disturbance.   Respiratory:  Positive for shortness of breath. Negative for chest tightness and wheezing.    Cardiovascular:  Negative for chest pain and palpitations.   Gastrointestinal:  Negative for abdominal pain, constipation and nausea.   Genitourinary:  Negative for difficulty urinating and dysuria.   Musculoskeletal:  Positive for gait problem. Negative for back pain and neck stiffness.   Skin:  Positive for wound. Negative for pallor and rash.   Neurological:  Positive for numbness. Negative for dizziness, speech difficulty and headaches.   Psychiatric/Behavioral:  Positive for agitation and sleep disturbance. Negative for confusion and suicidal ideas.    Objective:     Vital Signs (Most Recent):  Temp: 97 °F (36.1 °C) (09/12/22 1131)  Pulse: (!) 119 (09/12/22 1530)  Resp: 15 (09/12/22 1530)  BP: (!) 104/44 (09/12/22 1500)  SpO2: 95 % (09/12/22 1131)   Vital Signs (24h Range):  Temp:  [97 °F (36.1 °C)-98.5 °F (36.9 °C)] 97 °F (36.1 °C)  Pulse:  [] 119  Resp:  [8-28] 15  SpO2:  [68 %-100 %] 95 %  BP: ()/(23-97) 104/44     Weight: 131.2 kg (289 lb 4.8 oz)  Body mass index is 51.25 kg/m².    Intake/Output Summary (Last 24 hours) at 9/12/2022  1535  Last data filed at 9/12/2022 1245  Gross per 24 hour   Intake 500 ml   Output 2500 ml   Net -2000 ml      Physical Exam  Vitals reviewed.   Constitutional:       General: She is not in acute distress.     Appearance: She is morbidly obese.   Eyes:      Pupils: Pupils are equal, round, and reactive to light.   Cardiovascular:      Rate and Rhythm: Tachycardia present. Rhythm irregular.      Pulses: Normal pulses.   Pulmonary:      Effort: Pulmonary effort is normal. No respiratory distress.      Breath sounds: Normal breath sounds. No wheezing.   Abdominal:      General: Bowel sounds are normal. There is no distension.      Tenderness: There is no abdominal tenderness.   Musculoskeletal:        Legs:    Skin:     General: Skin is warm.   Neurological:      General: No focal deficit present.      Mental Status: She is alert, oriented to person, place, and time and easily aroused. Mental status is at baseline.   Psychiatric:         Mood and Affect: Mood normal.         Behavior: Behavior normal.       Significant Labs: All pertinent labs within the past 24 hours have been reviewed.  BMP:   Recent Labs   Lab 09/12/22  0951   *      K 4.4      CO2 28   BUN 40*   CREATININE 5.31*   CALCIUM 8.5     CBC:   Recent Labs   Lab 09/12/22  0951   WBC 11.36*   HGB 7.1*   HCT 23.1*        CMP:   Recent Labs   Lab 09/12/22  0951      K 4.4      CO2 28   *   BUN 40*   CREATININE 5.31*   CALCIUM 8.5   ANIONGAP 12       Significant Imaging: I have reviewed all pertinent imaging results/findings within the past 24 hours.    Intake/Output - Last 3 Shifts         09/10 0700 09/11 0659 09/11 0700 09/12 0659 09/12 0700 09/13 0659    P.O. 120 360 0    Other   500    Total Intake(mL/kg) 120 (0.9) 360 (2.7) 500 (3.8)    Urine (mL/kg/hr)       Other   2500    Stool       Total Output   2500    Net +120 +360 -2000           Stool Occurrence 2 x              Microbiology Results (last 7 days)        ** No results found for the last 168 hours. **                Assessment/Plan:      * Acute hypercapnic respiratory failure  Continue with BiPAP at night / when sleeping    Morbid obesity with BMI of 50.0-59.9, adult  Body mass index is 51.25 kg/m². Morbid obesity complicates all aspects of disease management from diagnostic modalities to treatment. Weight loss encouraged and health benefits explained to patient.         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 Patient is currently in atrial fibrillation.[id number] Score: 3. HASBLED Score: Unable to calculate. Anticoagulation indicated. Anticoagulation done with eliquis.        Pressure injury of buttock, unstageable    Continue wound care    HTN (hypertension)    Monitor blood pressure and adjust medicines as needed    Type 2 diabetes mellitus  Patient's FSGs are controlled on current medication regimen.  Last A1c reviewed-   Lab Results   Component Value Date    HGBA1C 5.3 08/25/2022     Most recent fingerstick glucose reviewed- No results for input(s): POCTGLUCOSE in the last 24 hours.  Current correctional scale  Medium  Maintain anti-hyperglycemic dose as follows-   Antihyperglycemics (From admission, onward)    Start     Stop Route Frequency Ordered    09/09/22 2100  insulin detemir U-100 injection 10 Units         -- SubQ Nightly 09/09/22 0948    08/27/22 1430  insulin aspart U-100 injection 0-5 Units         -- SubQ Before meals & nightly PRN 08/27/22 1431        No Oral hypoglycemics with patient having renal failure.    ESRD on dialysis  Continue with dialysis      Deep vein thrombosis (DVT) of  lower extremity        Diabetic ulcer of right heel associated with type 2 diabetes mellitus  Patient's FSGs are controlled on current medication regimen.  Last A1c reviewed-   Lab Results   Component Value Date    HGBA1C 5.3 08/25/2022     Most recent fingerstick glucose reviewed- No results for input(s): POCTGLUCOSE in the last 24 hours.  Current correctional scale  Medium  Maintain anti-hyperglycemic dose as follows-   Antihyperglycemics (From admission, onward)    Start     Stop Route Frequency Ordered    09/09/22 2100  insulin detemir U-100 injection 10 Units         -- SubQ Nightly 09/09/22 0948    08/27/22 1430  insulin aspart U-100 injection 0-5 Units         -- SubQ Before meals & nightly PRN 08/27/22 1431        No Oral hypoglycemics with patient's renal failure.    AKA recommended but patient refused      VTE Risk Mitigation (From admission, onward)         Ordered     apixaban tablet 2.5 mg  2 times daily         08/27/22 1431     Place sequential compression device  Until discontinued         08/27/22 1431     heparin (porcine) injection 4,000 Units  Use PRN         08/27/22 1431                Discharge Planning   JUN:      Code Status: Full Code   Is the patient medically ready for discharge?:     Reason for patient still in hospital (select all that apply): Laboratory test, Treatment, Imaging, Consult recommendations and Pending disposition  Discharge Plan A: Skilled Nursing Facility                  Jorge Hung MD  Department of Hospital Medicine   [facility]

## 2022-09-12 NOTE — CONSULTS
[facility]  General Surgery  Consult Note    Consults  Poor candidate for revascularization.  Heel ulcer consistent with severe arterial insufficiency.  Would recommend right AKA given her comorbidities and unlikely chance of her ambulating.  Technically could be discharged and she would like to follow-up with her surgeon Dr. Cross.  If she or her family changed her mind like any surgical interventions before then please re-consult    Subjective:     Chief Complaint/Reason for Admission:  Right heel ulcer    History of Present Illness:  66-year-old female multiple medical issues including end-stage renal disease, super morbid obesity, PVD, hypertension and diabetes who is admitted to the hospital for her right heel pain about 2 weeks ago.  The area has gotten little worse with more spreading necrosis consistent with arterial insufficiency    No current facility-administered medications on file prior to encounter.     Current Outpatient Medications on File Prior to Encounter   Medication Sig    polyethylene glycol (GLYCOLAX) 17 gram/dose powder Take 17 g by mouth once daily.    allopurinoL (ZYLOPRIM) 100 MG tablet Take 100 mg by mouth once daily.    aspirin (ECOTRIN) 81 MG EC tablet Take 81 mg by mouth once daily.    atorvastatin (LIPITOR) 40 MG tablet Take 40 mg by mouth every evening.    buPROPion (WELLBUTRIN) 75 MG tablet Take 75 mg by mouth 2 (two) times daily.    EScitalopram oxalate (LEXAPRO) 10 MG tablet Take 10 mg by mouth every evening.    folic acid (FOLVITE) 1 MG tablet Take 1,000 mcg by mouth every morning.    gabapentin (NEURONTIN) 100 MG capsule Take 200 mg by mouth every evening.    HYDROcodone-acetaminophen (NORCO) 7.5-325 mg per tablet Take 1 tablet by mouth every 6 (six) hours as needed.    levothyroxine (SYNTHROID) 100 MCG tablet Take 100 mcg by mouth every morning.    megestroL (MEGACE) 40 MG Tab Take 40 mg by mouth every morning.    melatonin  (MELATIN) 3 mg tablet Take 6 mg by mouth every evening.    NOVOLIN 70/30 U-100 INSULIN 100 unit/mL (70-30) injection Inject 25 Units into the skin 2 (two) times a day.    pantoprazole (PROTONIX) 40 MG tablet Take 40 mg by mouth once daily.    VITAMIN D3 125 mcg (5,000 unit) Tab Take 1 tablet by mouth once daily.    warfarin (COUMADIN) 5 MG tablet Take 5 mg by mouth Daily.       Review of patient's allergies indicates:   Allergen Reactions    Milk containing products     Pcn [penicillins]        Past Medical History:   Diagnosis Date    A-fib     Blind right eye     Diabetes mellitus     ESRD (end stage renal disease) on dialysis     GERD (gastroesophageal reflux disease)     Gout, unspecified     Heart attack     HTN (hypertension)     Kidney failure     Neuropathy      Past Surgical History:   Procedure Laterality Date    CHOLECYSTECTOMY      HYSTERECTOMY      LEG AMPUTATION THROUGH KNEE Left      Family History       Problem Relation (Age of Onset)    Cancer Brother    Cirrhosis Mother    Colon cancer Brother    Diabetes Father    Hypertension Father          Tobacco Use    Smoking status: Never    Smokeless tobacco: Never   Substance and Sexual Activity    Alcohol use: Never    Drug use: Never    Sexual activity: Not on file     Review of Systems   Constitutional:  Positive for activity change and fatigue.   Respiratory:  Negative for chest tightness.    Skin:  Positive for wound.   Psychiatric/Behavioral:  Positive for behavioral problems.    Objective:     Vital Signs (Most Recent):  Temp: 97 °F (36.1 °C) (09/12/22 1131)  Pulse: (!) 119 (09/12/22 1530)  Resp: 15 (09/12/22 1530)  BP: (!) 104/44 (09/12/22 1500)  SpO2: 95 % (09/12/22 1131)   Vital Signs (24h Range):  Temp:  [97 °F (36.1 °C)-98.5 °F (36.9 °C)] 97 °F (36.1 °C)  Pulse:  [] 119  Resp:  [8-28] 15  SpO2:  [68 %-100 %] 95 %  BP: ()/(23-97) 104/44     Weight: 131.2 kg (289 lb 4.8 oz)  Body mass index is 51.25  kg/m².      Intake/Output Summary (Last 24 hours) at 9/12/2022 1535  Last data filed at 9/12/2022 1245  Gross per 24 hour   Intake 500 ml   Output 2500 ml   Net -2000 ml       Physical Exam  Constitutional:       Appearance: She is obese.   Skin:     Findings: Lesion (Right heel area has a necrotic black discoloration of 6 cm heel aspect.  No purulence but maceration and early signs of gangrene) present.   Neurological:      General: No focal deficit present.       Significant Labs:  BMP:   Recent Labs   Lab 09/12/22  0951   *      K 4.4      CO2 28   BUN 40*   CREATININE 5.31*   CALCIUM 8.5     Cardiac markers: No results for input(s): CKMB, CPKMB, TROPONINT, TROPONINI, MYOGLOBIN in the last 48 hours.  CBC:   Recent Labs   Lab 09/12/22  0951   WBC 11.36*   RBC 2.34*   HGB 7.1*   HCT 23.1*      MCV 98.7*   MCH 30.3   MCHC 30.7*       Significant Diagnostics:  None  Biphasic waveforms down that extremity likely element of false elevation  Assessment/Plan:     Active Diagnoses:    Diagnosis Date Noted POA    PRINCIPAL PROBLEM:  Acute hypercapnic respiratory failure [J96.02] 08/28/2022 Yes    Morbid obesity with BMI of 50.0-59.9, adult [E66.01, Z68.43] 08/29/2022 Not Applicable    PVD (peripheral vascular disease) [I73.9] 08/27/2022 Unknown    Hypotension [I95.9] 08/26/2022 Yes    Diabetic ulcer of right heel associated with type 2 diabetes mellitus [E11.621, L97.419] 08/25/2022 Yes    Deep vein thrombosis (DVT) of lower extremity [I82.409] 08/25/2022 Yes    ESRD on dialysis [N18.6, Z99.2] 08/25/2022 Not Applicable    Pressure injury of buttock, unstageable [L89.300] 08/25/2022 Yes    Atrial fibrillation, controlled [I48.91] 08/25/2022 Yes    Infection of amputation stump [T87.40] 08/25/2022 Yes    Type 2 diabetes mellitus [E11.9] 08/25/2022 Yes    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 I will sign off. Please contact us if you have any additional questions.    Danilo Hanson MD  General Surgery  [facility]

## 2022-09-12 NOTE — ASSESSMENT & PLAN NOTE
Dialysis TTS  Continue with scheduled hemodialysis.  9/7/2022  Dialysis as scheduled.  9/8/2022 At this time, continue with scheduled dialysis.  9/9/2022  Dialysis as scheduled  9/10/2022  Continue current treatment  9/11/2022 No other changes with dialysis treatment.  9/12/2022  Dialysis as scheduled.

## 2022-09-12 NOTE — PROGRESS NOTES
[facility]  Adult Nutrition  Follow-up Note         Reason for Assessment  Reason For Assessment: length of stay follow up note  Nutrition Risk Screen: large or nonhealing wound, burn or pressure injury     Patient seen today for follow up. She is on a renal diet due to ESRD with dialysis. She receives Nepro and Matteo to aide in wound healing and provide adequate nutrition for dialysis. She is eating 50-75% of meals. Her family brings food in from the outside. Her CBW is 289#. Slight weight fluctuations expected due to dialysis. Recommend adding diabetic diet to diet order. Meds/labs reviewed. RD following.     Malnutrition  Is Patient Malnourished: No  Skin Integrity  Jarrett Risk Assessment  Sensory Perception: 3-->slightly limited  Moisture: 3-->occasionally moist  Activity: 1-->bedfast  Mobility: 3-->slightly limited  Nutrition: 2-->probably inadequate  Friction and Shear: 2-->potential problem  Jarrett Score: 14  Comments on skin integrity: skin breakdown  Nutrition Diagnosis  Increased protein Needs   related to Renal dysfunction and Wound healing as evidenced by ESRD and multiple areas of skin breakdown    Nutrition Diagnosis Status: Chronic/ continues        Nutrition Risk  Level of Risk/Frequency of Follow-up: moderate - high  Comments on nutrition risk: intake 50-75%, family brings in food to her at times  Recent Labs   Lab 09/12/22  0612   POCGLU 165*     Comments on Glucose: dx of diabetes  Nutrition Prescription / Recommendations  Recommendation/Intervention: Recommend adding diabetic diet to diet order. Continue with nepro and Matteo for increased needs for wound healing and dialysis.  Goals: weight maintenance, wound healing, INTAKE % with acceptance of supplements  Nutrition Goal Status: progressing towards goal  Communication of RD Recs: other (comment) (discussed in ITD meeting)  Current Diet Order: renal diet  Oral Nutrition Supplement: Nepro+ Matteo  Chewing or  Swallowing Difficulty?: No Chewing or swallowing difficulty  Recommended Diet: Consistent Carbohydrate 1800 (60g Carbs) and Renal (High Protein)  Recommended Oral Supplement: Nepro [420 kcals, 19g Protein, 38g Carbs(6g Fiber, 8g Sugar), 23g Fat] twice daily  Is Nutrition Support Recommended: No  Is Education Recommended: No  Monitor and Evaluation  % current Intake: P.O. intake of 50 - 75 %  % intake to meet estimated needs: P.O. + Supplements  Food and Nutrient Intake: food and beverage intake  Anthropometric Measurements: weight, weight change, body mass index  Biochemical Data, Medical Tests and Procedures: electrolyte and renal panel, glucose/endocrine profile, lipid profile, gastrointestinal profile, inflammatory profile  Nutrition-Focused Physical Findings: overall appearance  Energy Calories Required: not meeting needs  Protein Required: not meeting needs  Tolerance: tolerating  Current Medical Diagnosis and Past Medical History  Diagnosis: renal disease, infection/sepsis, diabetes diagnosis/complications  Past Medical History:   Diagnosis Date    A-fib     Blind right eye     Diabetes mellitus     ESRD (end stage renal disease) on dialysis     GERD (gastroesophageal reflux disease)     Gout, unspecified     Heart attack     HTN (hypertension)     Kidney failure     Neuropathy      Nutrition/Diet History  Spiritual, Cultural Beliefs, Anabaptist Practices, Values that Affect Care: no  Factors Affecting Nutritional Intake: None identified at this time  Lab/Procedures/Meds  No results for input(s): NA, K, BUN, CREATININE, CALCIUM, ALBUMIN, CL, ALT, AST, PHOS in the last 72 hours.  Last A1c:   Lab Results   Component Value Date    HGBA1C 5.3 08/25/2022     Lab Results   Component Value Date    RBC 2.52 (L) 09/09/2022    HGB 7.6 (L) 09/09/2022    HCT 24.8 (L) 09/09/2022    MCV 98.4 (H) 09/09/2022    MCH 30.2 09/09/2022    MCHC 30.6 (L) 09/09/2022     Pertinent Labs Reviewed: reviewed  Pertinent Labs  "Comments: 09/05/22 10:12  Sodium: 137  Potassium: 3.8  Chloride: 101  CO2: 28  Anion Gap: 12  BUN: 25 (H)  Creatinine: 4.83 (H)  BUN/CREAT RATIO: 5 (L)  eGFR: 9 (L)  Glucose: 161 (H)  Calcium: 9.1      (H): Data is abnormally high  (L): Data is abnormally low  Pertinent Medications Reviewed: reviewed  Pertinent Medications Comments: allopurinol, apixaban, atrovastatin, lexapro, folic acid, levothyroxine  Anthropometrics  Temp: 97.7 °F (36.5 °C)  Height Method: Stated  Height: 5' 3" (160 cm)  Height (inches): 63 in  Weight Method: Bed Scale  Weight: 131.2 kg (289 lb 4.8 oz)  Weight (lb): 289.3 lb  Ideal Body Weight (IBW), Female: 115 lb  % Ideal Body Weight, Female (lb): 266.64 %  BMI (Calculated): 51.3  BMI Grade: greater than 40 - morbid obesity  Amputation %: 16  Total Amputation %: 16     Estimated/Assessed Needs      Temp: 97.7 °F (36.5 °C)Oral  Weight Used For Calorie Calculations: 64 kg (141 lb 1.5 oz) (adjusted weight for obesity and amputation)   Energy Need Method: Kcal/kg Energy Calorie Requirements (kcal): 9942-5132  Weight Used For Protein Calculations: 64 kg (141 lb 1.5 oz) (adjusted for obesity and amputation)  Protein Requirements: 77-96g (1.2-1.5g pro/kg)  Estimated Fluid Requirement Method: RDA Method    RDA Method (mL): 1600     Nutrition by Nursing  Diet/Nutrition Received: other (see comments) (renal diet)  Intake (%): 50%  Diet/Feeding Assistance: assisted with feeding  Diet/Feeding Tolerance: fair  Last Bowel Movement: 09/12/22              Nutrition Follow-Up  RD Follow-up?: Yes     "

## 2022-09-12 NOTE — PT/OT/SLP PROGRESS
Occupational Therapy   Treatment    Name: Michelle Nunes  MRN: [id number]  Admitting Diagnosis:  Acute hypercapnic respiratory failure       Recommendations:     Discharge Recommendations: nursing facility, skilled, intermediate care facility/nursing home, home with home health  Discharge Equipment Recommendations:  none  Barriers to discharge:       Assessment:     Michelle Nunes is a 66 y.o. female with a medical diagnosis of Acute hypercapnic respiratory failure.   Performance deficits affecting function are weakness, impaired endurance, impaired self care skills, decreased upper extremity function.     Rehab Prognosis:  Fair; patient would benefit from acute skilled OT services to address these deficits and reach maximum level of function.       Plan:     Patient to be seen 5 x/week to address the above listed problems via self-care/home management, therapeutic activities, therapeutic exercises  Plan of Care Expires: 09/12/22  Plan of Care Reviewed with: patient    Subjective     Pain/Comfort:  Location - Side 1: Right  Location 1: foot  Pain Addressed 1: Distraction (pain medicine given)    Objective:     Communicated with: JAY Gaines prior to session.  Patient found HOB elevated with blood pressure cuff, oxygen, peripheral IV, pulse ox (continuous), telemetry upon OT entry to room.    General Precautions: Standard, fall, respiratory   Orthopedic Precautions:N/A   Braces: N/A  Respiratory Status:  nasal 3 Liters   Occupational Performance:     Bed Mobility:       Functional Mobility/Transfers:    Functional Mobility:  Activities of Daily Living:  Oral care with set up  Washed her face with set up      AMPAC 6 Click ADL:      Treatment & Education:  Pt performed hand helper with 2 rubber band resistances 20 reps, aarom 15 reps x 2 B shld flex,abd/add,elbow flex/ext,wrist flex/ext, aa red t band 10 reps B elbow flex/ext  Patient left supine with all lines intact and call button in reach    GOALS:    Multidisciplinary Problems       Occupational Therapy Goals          Problem: Occupational Therapy    Goal Priority Disciplines Outcome Interventions   Occupational Therapy Goal     OT, PT/OT Ongoing, Progressing    Description: STG:  Pt will perform grooming with setup  Pt will bathe with setup and min(A) for upper body anterior  Pt will perform UE dressing with setup  Pt will perform self- feeding with setup  Pt will perform HEP independently  Pt will tolerate 20 minutes of tx without fatigue      LT.Restore to max I with self care and mobility.                          Time Tracking:     OT Date of Treatment: 22  OT Start Time: 832  OT Stop Time: 908  OT Total Time (min): 36 min    Billable Minutes:Therapeutic Activity 30    OT/TARYN: TARYN          2022

## 2022-09-12 NOTE — PROGRESS NOTES
[facility]  Nephrology  Progress Note    Patient Name: iMchelle Nunes  MRN: [id number]  Admission Date: 8/27/2022  Hospital Length of Stay: 16 days  Attending Provider: Jorge Hung MD   Primary Care Physician: Primary Doctor No  Principal Problem:Acute hypercapnic respiratory failure    Subjective:     HPI: This patient is known to this nephrology service from a previous consult.  She has a history of ESRD due to HTN and DM.  She has a history of PVD and is s/p left AKA.  The patient presented with a wound on the right foot.  She has required broad spectrum antibiotics.  She has been transferred to Specialty for further care.  She is on pressor medications.      Interval History: The patient is moaning this afternoon.    Review of patient's allergies indicates:   Allergen Reactions    Milk containing products     Pcn [penicillins]      Current Facility-Administered Medications   Medication Frequency    0.9%  NaCl infusion PRN    acetaminophen tablet 650 mg Q4H PRN    allopurinoL tablet 100 mg Daily    apixaban tablet 2.5 mg BID    aspirin EC tablet 81 mg Daily    atorvastatin tablet 40 mg QHS    bisacodyL EC tablet 10 mg Daily PRN    collagenase ointment Daily PRN    dextromethorphan-guaiFENesin  mg/5 ml liquid 10 mL Q6H PRN    dextrose 50% injection 12.5 g PRN    dextrose 50% injection 25 g PRN    EScitalopram oxalate tablet 10 mg QHS    folic acid tablet 1,000 mcg QAM    gabapentin capsule 200 mg QHS    glucagon (human recombinant) injection 1 mg PRN    heparin (porcine) injection 4,000 Units PRN    HYDROcodone-acetaminophen  mg per tablet 1 tablet Q8H PRN    hydrocortisone suppository 25 mg BID    hydrOXYzine pamoate capsule 25 mg Q6H PRN    insulin aspart U-100 injection 0-5 Units QID (AC + HS) PRN    insulin detemir U-100 injection 10 Units QHS    levothyroxine tablet 100 mcg QAM    megestroL tablet 40 mg QAM    melatonin tablet 3 mg QHS     midodrine tablet 10 mg TID    ondansetron injection 4 mg Q8H PRN    pantoprazole EC tablet 40 mg Daily    simethicone chewable tablet 80 mg TID PRN    sodium chloride 0.9% flush 10 mL Q12H PRN    traZODone tablet 50 mg Nightly PRN    vitamin D 1000 units tablet 5,000 Units Daily       Objective:     Vital Signs (Most Recent):  Temp: 97 °F (36.1 °C) (09/12/22 1131)  Pulse: 108 (09/12/22 1300)  Resp: 13 (09/12/22 1300)  BP: (!) 109/91 (09/12/22 1245)  SpO2: 95 % (09/12/22 1131)  O2 Device (Oxygen Therapy): nasal cannula (09/12/22 0915)   Vital Signs (24h Range):  Temp:  [97 °F (36.1 °C)-98.5 °F (36.9 °C)] 97 °F (36.1 °C)  Pulse:  [] 108  Resp:  [8-28] 13  SpO2:  [68 %-100 %] 95 %  BP: ()/(20-97) 109/91     Weight: 131.2 kg (289 lb 4.8 oz) (09/12/22 0600)  Body mass index is 51.25 kg/m².  Body surface area is 2.41 meters squared.    I/O last 3 completed shifts:  In: 360 [P.O.:360]  Out: -     Physical Exam  Vitals reviewed.   HENT:      Head: Atraumatic.   Cardiovascular:      Rate and Rhythm: Regular rhythm.   Pulmonary:      Effort: Pulmonary effort is normal.   Abdominal:      Palpations: Abdomen is soft.   Neurological:      Mental Status: She is alert.   Psychiatric:         Mood and Affect: Mood normal.       Significant Labs:  BMP:   Recent Labs   Lab 09/12/22  0951   *      K 4.4      CO2 28   BUN 40*   CREATININE 5.31*   CALCIUM 8.5     CBC:   Recent Labs   Lab 09/12/22  0951   WBC 11.36*   RBC 2.34*   HGB 7.1*   HCT 23.1*      MCV 98.7*   MCH 30.3   MCHC 30.7*        Significant Imaging:  Labs: Reviewed    Assessment/Plan:     ESRD on dialysis  Dialysis TTS  Continue with scheduled hemodialysis.  9/7/2022  Dialysis as scheduled.  9/8/2022 At this time, continue with scheduled dialysis.  9/9/2022  Dialysis as scheduled  9/10/2022  Continue current treatment  9/11/2022 No other changes with dialysis treatment.  9/12/2022  Dialysis as scheduled.    Diabetic ulcer  of right heel associated with type 2 diabetes mellitus  Broad spectrum antitibiotics.  The condition is about the same. The patient is getting antibiotics  9/10/2022  PVD, photo documentation noted.  9/12/2022.  Wound care        Thank you for your consult. I will follow-up with patient. Please contact us if you have any additional questions.    Jef Romero Jr, MD  Nephrology  [facility]

## 2022-09-13 LAB
GLUCOSE SERPL-MCNC: 107 MG/DL (ref 70–105)
GLUCOSE SERPL-MCNC: 146 MG/DL (ref 70–105)
GLUCOSE SERPL-MCNC: 161 MG/DL (ref 70–105)
GLUCOSE SERPL-MCNC: 185 MG/DL (ref 70–105)

## 2022-09-13 PROCEDURE — 82962 GLUCOSE BLOOD TEST: CPT

## 2022-09-13 PROCEDURE — 94660 CPAP INITIATION&MGMT: CPT

## 2022-09-13 PROCEDURE — 99232 SBSQ HOSP IP/OBS MODERATE 35: CPT | Mod: ,,, | Performed by: HOSPITALIST

## 2022-09-13 PROCEDURE — 27000221 HC OXYGEN, UP TO 24 HOURS

## 2022-09-13 PROCEDURE — 96372 THER/PROPH/DIAG INJ SC/IM: CPT

## 2022-09-13 PROCEDURE — 25000003 PHARM REV CODE 250: Performed by: FAMILY MEDICINE

## 2022-09-13 PROCEDURE — 11042 DBRDMT SUBQ TIS 1ST 20SQCM/<: CPT | Mod: ,,, | Performed by: NURSE PRACTITIONER

## 2022-09-13 PROCEDURE — 11045 DEBRIDEMENT: ICD-10-PCS | Mod: ,,, | Performed by: NURSE PRACTITIONER

## 2022-09-13 PROCEDURE — 97110 THERAPEUTIC EXERCISES: CPT | Mod: CO

## 2022-09-13 PROCEDURE — 11042 DEBRIDEMENT: ICD-10-PCS | Mod: ,,, | Performed by: NURSE PRACTITIONER

## 2022-09-13 PROCEDURE — 27000952

## 2022-09-13 PROCEDURE — 11045 DBRDMT SUBQ TISS EACH ADDL: CPT | Mod: ,,, | Performed by: NURSE PRACTITIONER

## 2022-09-13 PROCEDURE — 99232 PR SUBSEQUENT HOSPITAL CARE,LEVL II: ICD-10-PCS | Mod: ,,, | Performed by: HOSPITALIST

## 2022-09-13 PROCEDURE — 27000940

## 2022-09-13 PROCEDURE — 11000008

## 2022-09-13 PROCEDURE — 99900035 HC TECH TIME PER 15 MIN (STAT)

## 2022-09-13 PROCEDURE — 63600175 PHARM REV CODE 636 W HCPCS: Performed by: FAMILY MEDICINE

## 2022-09-13 RX ADMIN — ALLOPURINOL 100 MG: 100 TABLET ORAL at 08:09

## 2022-09-13 RX ADMIN — Medication 3 MG: at 09:09

## 2022-09-13 RX ADMIN — APIXABAN 2.5 MG: 2.5 TABLET, FILM COATED ORAL at 08:09

## 2022-09-13 RX ADMIN — ESCITALOPRAM OXALATE 10 MG: 10 TABLET ORAL at 09:09

## 2022-09-13 RX ADMIN — LEVOTHYROXINE SODIUM 100 MCG: 100 TABLET ORAL at 06:09

## 2022-09-13 RX ADMIN — MEGESTROL ACETATE 40 MG: 40 TABLET ORAL at 06:09

## 2022-09-13 RX ADMIN — HYDROXYZINE PAMOATE 25 MG: 25 CAPSULE ORAL at 12:09

## 2022-09-13 RX ADMIN — APIXABAN 2.5 MG: 2.5 TABLET, FILM COATED ORAL at 09:09

## 2022-09-13 RX ADMIN — ASPIRIN 81 MG: 81 TABLET, COATED ORAL at 08:09

## 2022-09-13 RX ADMIN — HYDROCODONE BITARTRATE AND ACETAMINOPHEN 1 TABLET: 10; 325 TABLET ORAL at 08:09

## 2022-09-13 RX ADMIN — MIDODRINE HYDROCHLORIDE 10 MG: 5 TABLET ORAL at 03:09

## 2022-09-13 RX ADMIN — PANTOPRAZOLE SODIUM 40 MG: 40 TABLET, DELAYED RELEASE ORAL at 08:09

## 2022-09-13 RX ADMIN — ATORVASTATIN CALCIUM 40 MG: 40 TABLET, FILM COATED ORAL at 09:09

## 2022-09-13 RX ADMIN — Medication 5000 UNITS: at 08:09

## 2022-09-13 RX ADMIN — MIDODRINE HYDROCHLORIDE 10 MG: 5 TABLET ORAL at 09:09

## 2022-09-13 RX ADMIN — FOLIC ACID 1000 MCG: 1 TABLET ORAL at 06:09

## 2022-09-13 RX ADMIN — HYDROCODONE BITARTRATE AND ACETAMINOPHEN 1 TABLET: 10; 325 TABLET ORAL at 12:09

## 2022-09-13 RX ADMIN — INSULIN DETEMIR 10 UNITS: 100 INJECTION, SOLUTION SUBCUTANEOUS at 09:09

## 2022-09-13 RX ADMIN — GABAPENTIN 200 MG: 100 CAPSULE ORAL at 09:09

## 2022-09-13 RX ADMIN — MIDODRINE HYDROCHLORIDE 10 MG: 5 TABLET ORAL at 08:09

## 2022-09-13 RX ADMIN — HYDROCORTISONE ACETATE 25 MG: 25 SUPPOSITORY RECTAL at 08:09

## 2022-09-13 NOTE — PLAN OF CARE
SS spoke with pt's daughter Barbi, She is ok with pt going home with family, if they will help her and her niece care for her. SS awaiting for daughter to call back with Marc's ernesto

## 2022-09-13 NOTE — PLAN OF CARE
Problem: Occupational Therapy  Goal: Occupational Therapy Goal  Description: STG:  Pt will perform grooming with setup  Pt will bathe with setup and min(A) for upper body anterior  Pt will perform UE dressing with setup  Pt will perform self- feeding with setup  Pt will perform HEP independently  Pt will tolerate 20 minutes of tx without fatigue      LT.Restore to max I with self care and mobility.     Outcome: Ongoing, Not Progressing   Oral care with set up  Washed her face with set up   Feel pt has reached max benefit from OT services  and feel she is functioning @ her maximal level of I. Pt abilities can vary secondary to her cognition. Pt will require 24 hour care @ d/c

## 2022-09-13 NOTE — PLAN OF CARE
Problem: Skin Injury Risk Increased  Goal: Skin Health and Integrity  Outcome: Ongoing, Progressing  Intervention: Optimize Skin Protection  Flowsheets (Taken 9/13/2022 1637)  Pressure Reduction Techniques:   heels elevated off bed   weight shift assistance provided  Pressure Reduction Devices:   heel offloading device utilized   specialty bed utilized  Skin Protection:   tubing/devices free from skin contact   pulse oximeter probe site changed   incontinence pads utilized  Head of Bed (HOB) Positioning: HOB at 30-45 degrees  Intervention: Promote and Optimize Oral Intake  Flowsheets (Taken 9/13/2022 1637)  Oral Nutrition Promotion:   rest periods promoted   safe use of adaptive equipment encouraged   social interaction promoted   physical activity promoted

## 2022-09-13 NOTE — PT/OT/SLP PROGRESS
Occupational Therapy   Treatment    Name: Michelle Nunes  MRN: [id number]  Admitting Diagnosis:  Acute hypercapnic respiratory failure       Recommendations:     Discharge Recommendations: nursing facility, skilled, intermediate care facility/nursing home  Discharge Equipment Recommendations:  none  Barriers to discharge:       Assessment:     Michelle Nunes is a 66 y.o. female with a medical diagnosis of Acute hypercapnic respiratory failure.  . Performance deficits affecting function are weakness, impaired endurance.     Rehab Prognosis:  Fair; patient would benefit from acute skilled OT services to address these deficits and reach maximum level of function.       Plan:     Patient to be seen 5 x/week to address the above listed problems via self-care/home management, therapeutic activities, therapeutic exercises  Plan of Care Expires: 09/12/22  Plan of Care Reviewed with: patient    Subjective     Pain/Comfort:  Pain Rating 1: 9/10  Location 1: hip  Pain Addressed 1: Nurse notified, Distraction    Objective:     Communicated with: JAY Zamora prior to session.  Patient found HOB elevated with blood pressure cuff, peripheral IV, pulse ox (continuous), telemetry upon OT entry to room.    General Precautions: Standard, fall, respiratory   Orthopedic Precautions:N/A   Braces: N/A  Respiratory Status: Nasal cannula, flow 3 L/min     Occupational Performance:     Bed Mobility:        Functional Mobility/Transfers:    Functional Mobility:     Activities of Daily Living:  Pt washed her hands and face with set up  Nurse stated that she was dep for feeding      AMPA 6 Click ADL:      Treatment & Education:  Pt performed aarom with 1 lb wt 15 reps x 2 B shld flex,abd/add,elbow flex/ext,wrist  flex/ext,hand helper with 2 rubber band resistances 15 reps   Feel pt has reached max benefit from skilled OT services and feel she is functioning @ her max level of I. Pt abilities can vary secondary to her cognition   therefore recommend d/c from OT.    Patient left HOB elevated with all lines intact    GOALS:   Multidisciplinary Problems       Occupational Therapy Goals          Problem: Occupational Therapy    Goal Priority Disciplines Outcome Interventions   Occupational Therapy Goal     OT, PT/OT Ongoing, Not Progressing    Description: STG:  Pt will perform grooming with setup  Pt will bathe with setup and min(A) for upper body anterior  Pt will perform UE dressing with setup  Pt will perform self- feeding with setup  Pt will perform HEP independently  Pt will tolerate 20 minutes of tx without fatigue      LT.Restore to max I with self care and mobility.                          Time Tracking:     OT Date of Treatment: 22  OT Start Time: 828  OT Stop Time: 857  OT Total Time (min): 29 min    Billable Minutes:Therapeutic Exercise 20    OT/TARYN: TARYN          2022

## 2022-09-13 NOTE — PROGRESS NOTES
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 She presented to emergency department with right lower extremity pain. Necrotic tissue to right heel, maceration periwound. X-ray, Plantar greater than posterior calcaneal spurring.  No acute fracture or dislocation.  Atherosclerotic calcification demonstrated. Bedside debridement on 8/29 and 9/1. Cultures positive for Proteus mirabilis, Escherichia coli, and Enterococcus faecalis. Wound remains stagnant.Patient would like to salvage limb but she is at high risk for amputation necrosis, infection, and noncompliance with keeping pressure off heel. Pt had left AKA on 5-, Dr. Cross at [facility]. Wounds on buttock have worsened, bedside debridement to right buttock. Past medical history includes hypertension, ESRD on hemodialysis, atrial fibrillation,  and diabetes mellitus. She was hospitalized on 7/2/2022 with infection of the left AKA.  She was on IV antibiotics and  Wound VAC was placed. Reports she was diagnosed with DVT of her RLE at [facility] on 8/18. Venous doppler on 8/24 is negative for DVT. Wound healing is complicated by diabetes, a-fib, limited mobility, infection, anemia, and obesity.        Review of Systems   Constitutional:  Positive for activity change. Negative for chills and fever.   Respiratory:  Negative for chest tightness and shortness of breath.    Cardiovascular:  Positive  for leg swelling. Negative for chest pain and palpitations.   Musculoskeletal:  Positive for arthralgias and joint swelling.   Skin:  Positive for wound.        wound   Neurological:  Positive for weakness.   Psychiatric/Behavioral:  Negative for agitation, behavioral problems, confusion and self-injury.    Objective:     Vital Signs (Most Recent):  Temp: 98 °F (36.7 °C) (09/13/22 1100)  Pulse: 103 (09/13/22 1100)  Resp: 11 (09/13/22 1100)  BP: (!) 48/26 (09/13/22 1100)  SpO2: 100 % (09/13/22 0600)   Vital Signs (24h Range):  Temp:  [97.3 °F (36.3 °C)-98.6 °F (37 °C)] 98 °F (36.7 °C)  Pulse:  [] 103  Resp:  [9-24] 11  SpO2:  [95 %-100 %] 100 %  BP: ()/() 48/26     Weight: 128.5 kg (283 lb 4.7 oz) (bed scale without box at foot of bed)  Body mass index is 50.18 kg/m².  No data recorded    Physical Exam  Vitals reviewed.   Constitutional:       Appearance: She is obese. She is ill-appearing.   HENT:      Head: Normocephalic.   Cardiovascular:      Rate and Rhythm: Tachycardia present. Rhythm irregular.      Comments: Hypoptensive  Pulmonary:      Effort: Pulmonary effort is normal.   Abdominal:      General: There is distension.   Musculoskeletal:         General: Swelling, tenderness and deformity present.      Right lower leg: Edema present.      Left lower leg: Edema present.      Comments: Left AKA   Skin:     Findings: Erythema present.      Comments: Wound, see LDA for photo/measurements   Neurological:      Mental Status: She is alert. Mental status is at baseline.      Motor: Weakness present.      Gait: Gait abnormal.             Altered Skin Integrity 08/24/22 Right Heel Ulceration (Active)   08/24/22    Altered Skin Integrity Present on Admission: yes   Side: Right   Orientation:    Location: Heel   Wound Number:    Is this injury device related?:    Primary Wound Type: Ulceration   Description of Altered Skin Integrity:    Ankle-Brachial Index:    Pulses:    Removal Indication and  Assessment:    Wound Outcome:    (Retired) Wound Length (cm):    (Retired) Wound Width (cm):    (Retired) Depth (cm):    Wound Description (Comments):    Removal Indications:    Wound Image    09/13/22 1145   Description of Altered Skin Integrity Full thickness tissue loss. Base is covered by slough and/or eschar in the wound bed 09/13/22 1145   Dressing Appearance Moist drainage 09/13/22 1145   Drainage Amount Scant 09/13/22 1145   Drainage Characteristics/Odor Serous;[place] 09/13/22 1145   Appearance Dressing in place, unable to visualize 09/13/22 2000   Tissue loss description Not applicable 09/13/22 1145   Black (%), Wound Tissue Color 80 % 09/12/22 1230   Red (%), Wound Tissue Color 0 % 09/12/22 1230   Yellow (%), Wound Tissue Color 20 % 09/12/22 1230   Periwound Area Dry 09/13/22 1145   Wound Edges Defined 09/13/22 1145   Wound Length (cm) 6 cm 09/12/22 1230   Wound Width (cm) 7 cm 09/12/22 1230   Wound Depth (cm) 0 cm 09/12/22 1230   Wound Volume (cm^3) 0 cm^3 09/12/22 1230   Wound Surface Area (cm^2) 42 cm^2 09/12/22 1230   Care Cleansed with:;Soap and water;Wound cleanser;Applied:;Removed:;Skin Barrier;Moisturizing agent 09/13/22 1145   Dressing Removed;Applied;Changed;Collagen;Gauze;Gauze, wet to moist 09/13/22 1145   Periwound Care Moisture barrier applied;Skin barrier film applied 09/13/22 1145   Dressing Change Due 09/14/22 09/13/22 1145   Number of days: 52            Altered Skin Integrity 08/24/22 Left Buttocks Ulceration (Active)   08/24/22    Altered Skin Integrity Present on Admission: yes   Side: Left   Orientation:    Location: Buttocks   Wound Number:    Is this injury device related?:    Primary Wound Type: Ulceration   Description of Altered Skin Integrity:    Ankle-Brachial Index:    Pulses:    Removal Indication and Assessment:    Wound Outcome:    (Retired) Wound Length (cm):    (Retired) Wound Width (cm):    (Retired) Depth (cm):    Wound Description (Comments):    Removal Indications:     Wound Image    09/12/22 1230   Description of Altered Skin Integrity Full thickness tissue loss. Subcutaneous fat may be visible but bone, tendon or muscle are not exposed 09/12/22 1230   Dressing Appearance Moist drainage 09/12/22 1230   Drainage Amount Scant 09/12/22 1230   Drainage Characteristics/Odor Serosanguineous;No odor 09/12/22 1230   Appearance Pink;Red;Tan;Moist 09/12/22 1230   Tissue loss description Full thickness 09/12/22 1230   Black (%), Wound Tissue Color 0 % 09/12/22 1230   Red (%), Wound Tissue Color 90 % 09/12/22 1230   Yellow (%), Wound Tissue Color 10 % 09/12/22 1230   Periwound Area Excoriated;Moist 09/12/22 1230   Wound Edges Undefined 09/12/22 1230   Wound Length (cm) 5 cm 09/12/22 1230   Wound Width (cm) 4 cm 09/12/22 1230   Wound Depth (cm) 0.3 cm 09/12/22 1230   Wound Volume (cm^3) 6 cm^3 09/12/22 1230   Wound Surface Area (cm^2) 20 cm^2 09/12/22 1230   Care Cleansed with:;Soap and water;Wound cleanser;Applied:;Skin Barrier 09/12/22 1230   Dressing Removed;Applied;Changed;Calcium alginate;Hydrocolloid 09/12/22 1230   Dressing Change Due 09/14/22 09/12/22 1230   Number of days: 52            Altered Skin Integrity 08/25/22 0206 Right Buttocks Ulceration (Active)   08/25/22 0206   Altered Skin Integrity Present on Admission: yes   Side: Right   Orientation:    Location: Buttocks   Wound Number:    Is this injury device related?:    Primary Wound Type: Ulceration   Description of Altered Skin Integrity:    Ankle-Brachial Index:    Pulses:    Removal Indication and Assessment:    Wound Outcome:    (Retired) Wound Length (cm):    (Retired) Wound Width (cm):    (Retired) Depth (cm):    Wound Description (Comments):    Removal Indications:    Wound Image    09/12/22 1230   Description of Altered Skin Integrity Full thickness tissue loss. Base is covered by slough and/or eschar in the wound bed 09/12/22 1230   Dressing Appearance Moist drainage 09/12/22 1230   Drainage Amount Small 09/12/22  1230   Drainage Characteristics/Odor Serous;Tan;Other (see comments) 09/12/22 1230   Appearance Black;Tan;Necrotic;Moist 09/12/22 1230   Tissue loss description Not applicable 09/12/22 1230   Black (%), Wound Tissue Color 5 % 09/12/22 1230   Red (%), Wound Tissue Color 0 % 09/12/22 1230   Yellow (%), Wound Tissue Color 95 % 09/12/22 1230   Periwound Area Dry 09/12/22 1230   Wound Edges Defined;Open 09/12/22 1230   Wound Length (cm) 5 cm 09/12/22 1230   Wound Width (cm) 5.5 cm 09/12/22 1230   Wound Depth (cm) 0.3 cm 09/12/22 1230   Wound Volume (cm^3) 8.25 cm^3 09/12/22 1230   Wound Surface Area (cm^2) 27.5 cm^2 09/12/22 1230   Care Cleansed with:;Soap and water;Wound cleanser;Applied:;Removed:;Skin Barrier;Moisturizing agent 09/12/22 1230   Dressing Removed;Applied;Changed;Collagen;Gauze, wet to moist;Gauze 09/12/22 1230   Periwound Care Skin barrier film applied 09/12/22 1230   Dressing Change Due 09/14/22 09/12/22 1230   Number of days: 51            Altered Skin Integrity 08/27/22 1745 Left anterior Thigh (Active)   08/27/22 1745   Altered Skin Integrity Present on Admission:    Side: Left   Orientation: anterior   Location: Thigh   Wound Number:    Is this injury device related?:    Primary Wound Type:    Description of Altered Skin Integrity:    Ankle-Brachial Index:    Pulses:    Removal Indication and Assessment:    Wound Outcome:    (Retired) Wound Length (cm):    (Retired) Wound Width (cm):    (Retired) Depth (cm):    Wound Description (Comments):    Removal Indications:    Dressing Appearance Intact 09/08/22 2010   Drainage Amount None 09/06/22 1928   Appearance Pink 09/03/22 2015   Care Soap and water;Cleansed with: 09/06/22 1928   Dressing Removed;Applied;Changed 09/05/22 1710   Number of days: 49            Altered Skin Integrity 08/29/22 1130 posterior Perineum Incontinence associated dermatitis (Active)   08/29/22 1130   Altered Skin Integrity Present on Admission: yes   Side:    Orientation:  posterior   Location: Perineum   Wound Number:    Is this injury device related?: No   Primary Wound Type: Incontinence   Description of Altered Skin Integrity:    Ankle-Brachial Index:    Pulses:    Removal Indication and Assessment:    Wound Outcome:    (Retired) Wound Length (cm):    (Retired) Wound Width (cm):    (Retired) Depth (cm):    Wound Description (Comments):    Removal Indications:    Wound Image    09/12/22 1230   Dressing Appearance Moist drainage 09/13/22 1145   Drainage Amount Scant 09/13/22 1145   Drainage Characteristics/Odor Serosanguineous 09/13/22 1145   Appearance Pink;Moist 09/13/22 1145   Tissue loss description Full thickness 09/13/22 1145   Black (%), Wound Tissue Color 0 % 09/12/22 1230   Red (%), Wound Tissue Color 100 % 09/12/22 1230   Yellow (%), Wound Tissue Color 0 % 09/12/22 1230   Periwound Area [place] 09/13/22 1145   Wound Edges Undefined 09/13/22 1145   Care Cleansed with:;Soap and water;Wound cleanser;Applied:;Skin Barrier 09/13/22 1145   Dressing Removed;Applied;Changed;Calcium alginate;Hydrocolloid 09/13/22 1145   Dressing Change Due 09/14/22 09/13/22 1145   Number of days: 47            Altered Skin Integrity 08/29/22 1130 Right posterior;medial Buttocks Full thickness tissue loss. Subcutaneous fat may be visible but bone, tendon or muscle are not exposed (Active)   08/29/22 1130   Altered Skin Integrity Present on Admission: yes   Side: Right   Orientation: posterior;medial   Location: Buttocks   Wound Number:    Is this injury device related?: No   Primary Wound Type:    Description of Altered Skin Integrity: Full thickness tissue loss. Subcutaneous fat may be visible but bone, tendon or muscle are not exposed   Ankle-Brachial Index:    Pulses:    Removal Indication and Assessment:    Wound Outcome:    (Retired) Wound Length (cm):    (Retired) Wound Width (cm):    (Retired) Depth (cm):    Wound Description (Comments):    Removal Indications:    Wound Image    09/12/22 1230    Description of Altered Skin Integrity Partial thickness tissue loss. Shallow open ulcer with a red or pink wound bed, without slough. Intact or Open/Ruptured Serum-filled blister. 09/12/22 1230   Dressing Appearance Dry 09/13/22 1145   Drainage Amount None 09/13/22 1145   Appearance Dry;Pink 09/13/22 1145   Tissue loss description Partial thickness 09/13/22 1145   Black (%), Wound Tissue Color 0 % 09/12/22 1230   Red (%), Wound Tissue Color 100 % 09/12/22 1230   Yellow (%), Wound Tissue Color 0 % 09/12/22 1230   Periwound Area Dry 09/13/22 1145   Wound Edges Defined 09/13/22 1145   Wound Length (cm) 1 cm 09/12/22 1230   Wound Width (cm) 1 cm 09/12/22 1230   Wound Depth (cm) 0 cm 09/12/22 1230   Wound Volume (cm^3) 0 cm^3 09/12/22 1230   Wound Surface Area (cm^2) 1 cm^2 09/12/22 1230   Care Cleansed with:;Soap and water;Wound cleanser;Applied:;Removed:;Skin Barrier 09/13/22 1145   Dressing Removed;Applied;Changed;Hydrocolloid;Calcium alginate 09/13/22 1145   Dressing Change Due 09/14/22 09/13/22 1145   Number of days: 47            Altered Skin Integrity 08/29/22 1130 Left posterior;proximal Buttocks Partial thickness tissue loss. Shallow open ulcer with a red or pink wound bed, without slough. Intact or Open/Ruptured Serum-filled blister. (Active)   08/29/22 1130   Altered Skin Integrity Present on Admission: yes   Side: Left   Orientation: posterior;proximal   Location: Buttocks   Wound Number:    Is this injury device related?: No   Primary Wound Type:    Description of Altered Skin Integrity: Partial thickness tissue loss. Shallow open ulcer with a red or pink wound bed, without slough. Intact or Open/Ruptured Serum-filled blister.   Ankle-Brachial Index:    Pulses:    Removal Indication and Assessment:    Wound Outcome:    (Retired) Wound Length (cm):    (Retired) Wound Width (cm):    (Retired) Depth (cm):    Wound Description (Comments):    Removal Indications:    Wound Image    09/12/22 1230   Description of  Altered Skin Integrity Partial thickness tissue loss. Shallow open ulcer with a red or pink wound bed, without slough. Intact or Open/Ruptured Serum-filled blister. 09/12/22 1230   Dressing Appearance Dry 09/12/22 1230   Drainage Amount None 09/12/22 1230   Appearance Pink;Dry 09/12/22 1230   Tissue loss description Partial thickness 09/12/22 1230   Black (%), Wound Tissue Color 0 % 09/12/22 1230   Red (%), Wound Tissue Color 100 % 09/12/22 1230   Yellow (%), Wound Tissue Color 0 % 09/12/22 1230   Periwound Area Dry 09/12/22 1230   Wound Edges Defined 09/12/22 1230   Wound Length (cm) 1 cm 09/12/22 1230   Wound Width (cm) 1 cm 09/12/22 1230   Wound Depth (cm) 0 cm 09/12/22 1230   Wound Volume (cm^3) 0 cm^3 09/12/22 1230   Wound Surface Area (cm^2) 1 cm^2 09/12/22 1230   Care Cleansed with:;Soap and water;Wound cleanser;Applied:;Removed:;Skin Barrier 09/12/22 1230   Dressing Hydrocolloid 09/13/22 1145   Dressing Change Due 09/14/22 09/13/22 1145   Number of days: 47            Altered Skin Integrity 08/29/22 1130 Left anterior other (see comments) Ulceration (Active)   08/29/22 1130   Altered Skin Integrity Present on Admission: yes   Side: Left   Orientation: anterior   Location: other (see comments)   Wound Number:    Is this injury device related?: No   Primary Wound Type: Ulceration   Description of Altered Skin Integrity:    Ankle-Brachial Index:    Pulses:    Removal Indication and Assessment:    Wound Outcome:    (Retired) Wound Length (cm):    (Retired) Wound Width (cm):    (Retired) Depth (cm):    Wound Description (Comments):    Removal Indications:    Wound Image    09/12/22 1230   Dressing Appearance Dried drainage 09/13/22 1145   Drainage Amount Scant 09/13/22 1145   Drainage Characteristics/Odor Serosanguineous;No odor 09/13/22 1145   Appearance Pink;Moist 09/13/22 1145   Tissue loss description Full thickness 09/13/22 1145   Black (%), Wound Tissue Color 0 % 09/12/22 1230   Red (%), Wound Tissue Color  99 % 09/12/22 1230   Yellow (%), Wound Tissue Color 0 % 09/12/22 1230   Periwound Area Dry 09/13/22 1145   Wound Edges Undefined 09/13/22 1145   Wound Length (cm) 2 cm 09/12/22 1230   Wound Width (cm) 10 cm 09/12/22 1230   Wound Depth (cm) 1 cm 08/29/22 1130   Wound Volume (cm^3) 45 cm^3 08/29/22 1130   Wound Surface Area (cm^2) 20 cm^2 09/12/22 1230   Care Cleansed with:;Soap and water;Wound cleanser;Applied:;Removed:;Skin Barrier 09/13/22 1145   Dressing Removed;Applied;Changed;Hydrofiber;Gauze 09/13/22 1145   Periwound Care Moisture barrier applied;Skin barrier film applied 09/12/22 1230   Dressing Change Due 09/14/22 09/13/22 1145   Number of days: 47            Altered Skin Integrity 08/29/22 1130 Left posterior;distal Thigh Abrasion(s) (Active)   08/29/22 1130   Altered Skin Integrity Present on Admission: yes   Side: Left   Orientation: posterior;distal   Location: Thigh   Wound Number:    Is this injury device related?: No   Primary Wound Type: Abrasion(s)   Description of Altered Skin Integrity:    Ankle-Brachial Index:    Pulses:    Removal Indication and Assessment:    Wound Outcome:    (Retired) Wound Length (cm):    (Retired) Wound Width (cm):    (Retired) Depth (cm):    Wound Description (Comments):    Removal Indications:    Wound Image    09/12/22 1230   Description of Altered Skin Integrity Partial thickness tissue loss. Shallow open ulcer with a red or pink wound bed, without slough. Intact or Open/Ruptured Serum-filled blister. 09/12/22 1230   Dressing Appearance Dry 09/12/22 1230   Drainage Amount None 09/12/22 1230   Drainage Characteristics/Odor Serosanguineous;No odor 09/06/22 1230   Appearance Pink;Dry 09/12/22 1230   Tissue loss description Partial thickness 09/12/22 1230   Black (%), Wound Tissue Color 0 % 09/12/22 1230   Red (%), Wound Tissue Color 100 % 09/12/22 1230   Yellow (%), Wound Tissue Color 0 % 09/12/22 1230   Periwound Area Moist 09/12/22 1230   Wound Length (cm) 1 cm 09/12/22  1230   Wound Width (cm) 1 cm 09/12/22 1230   Wound Depth (cm) 0 cm 09/12/22 1230   Wound Volume (cm^3) 0 cm^3 09/12/22 1230   Wound Surface Area (cm^2) 1 cm^2 09/12/22 1230   Care Cleansed with:;Soap and water 09/12/22 1230   Number of days: 47            Altered Skin Integrity 08/29/22 1130 Right medial Buttocks Full thickness tissue loss. Subcutaneous fat may be visible but bone, tendon or muscle are not exposed (Active)   08/29/22 1130   Altered Skin Integrity Present on Admission: yes   Side: Right   Orientation: medial   Location: Buttocks   Wound Number:    Is this injury device related?:    Primary Wound Type:    Description of Altered Skin Integrity: Full thickness tissue loss. Subcutaneous fat may be visible but bone, tendon or muscle are not exposed   Ankle-Brachial Index:    Pulses:    Removal Indication and Assessment:    Wound Outcome:    (Retired) Wound Length (cm):    (Retired) Wound Width (cm):    (Retired) Depth (cm):    Wound Description (Comments):    Removal Indications:    Wound Image    09/12/22 1230   Description of Altered Skin Integrity Full thickness tissue loss. Subcutaneous fat may be visible but bone, tendon or muscle are not exposed 09/12/22 1230   Dressing Appearance Moist drainage 09/13/22 1145   Drainage Amount Scant 09/13/22 1145   Drainage Characteristics/Odor Serosanguineous;No odor 09/13/22 1145   Appearance Red;Tan;Moist 09/13/22 1145   Tissue loss description Full thickness 09/13/22 1145   Black (%), Wound Tissue Color 0 % 09/12/22 1230   Red (%), Wound Tissue Color 70 % 09/12/22 1230   Yellow (%), Wound Tissue Color 30 % 09/12/22 1230   Periwound Area Dry 09/13/22 1145   Wound Edges Defined;Open 09/13/22 1145   Wound Length (cm) 1 cm 09/12/22 1230   Wound Width (cm) 0.5 cm 09/12/22 1230   Wound Depth (cm) 0.2 cm 09/12/22 1230   Wound Volume (cm^3) 0.1 cm^3 09/12/22 1230   Wound Surface Area (cm^2) 0.5 cm^2 09/12/22 1230   Care Cleansed with:;Soap and water;Wound  cleanser;Applied:;Removed:;Skin Barrier 09/13/22 1145   Dressing Removed;Applied;Changed;Collagen;Gauze, wet to moist;Gauze 09/13/22 1145   Periwound Care Moisturizer applied 09/13/22 1145   Dressing Change Due 09/14/22 09/13/22 1145   Number of days: 47            Altered Skin Integrity 08/31/22 1020 Right Clavicle Full thickness tissue loss. Base is covered by slough and/or eschar in the wound bed (Active)   08/31/22 1020   Altered Skin Integrity Present on Admission:    Side: Right   Orientation:    Location: Clavicle   Wound Number:    Is this injury device related?:    Primary Wound Type:    Description of Altered Skin Integrity: Full thickness tissue loss. Base is covered by slough and/or eschar in the wound bed   Ankle-Brachial Index:    Pulses:    Removal Indication and Assessment:    Wound Outcome:    (Retired) Wound Length (cm):    (Retired) Wound Width (cm):    (Retired) Depth (cm):    Wound Description (Comments):    Removal Indications:    Wound Image    09/12/22 1230   Description of Altered Skin Integrity Full thickness tissue loss. Base is covered by slough and/or eschar in the wound bed 09/12/22 1230   Dressing Appearance Dry 09/13/22 1145   Drainage Amount None 09/13/22 1145   Drainage Characteristics/Odor Serous;Tan;No odor 09/06/22 1230   Appearance [place];Dry 09/13/22 1145   Tissue loss description Not applicable 09/13/22 1145   Black (%), Wound Tissue Color 0 % 09/12/22 1230   Red (%), Wound Tissue Color 0 % 09/12/22 1230   Yellow (%), Wound Tissue Color 100 % 09/12/22 1230   Periwound Area Dry 09/13/22 1145   Wound Edges Defined 09/13/22 1145   Wound Length (cm) 3 cm 09/12/22 1230   Wound Width (cm) 2.5 cm 09/12/22 1230   Wound Depth (cm) 0 cm 09/12/22 1230   Wound Volume (cm^3) 0 cm^3 09/12/22 1230   Wound Surface Area (cm^2) 7.5 cm^2 09/12/22 1230   Care Cleansed with:;Soap and water;Wound cleanser;Applied:;Removed:;Skin Barrier;Povidone iodine 09/13/22 1145   Dressing  Removed;Applied;Changed;Gauze, wet to moist;Gauze 09/13/22 1145   Dressing Change Due 09/14/22 09/13/22 1145   Number of days: 45            Altered Skin Integrity 09/01/22 1207 Left posterior Hip Ulceration (Active)   09/01/22 1207   Altered Skin Integrity Present on Admission:    Side: Left   Orientation: posterior   Location: Hip   Wound Number:    Is this injury device related?: No   Primary Wound Type: Ulceration   Description of Altered Skin Integrity:    Ankle-Brachial Index:    Pulses:    Removal Indication and Assessment:    Wound Outcome:    (Retired) Wound Length (cm):    (Retired) Wound Width (cm):    (Retired) Depth (cm):    Wound Description (Comments):    Removal Indications:    Wound Image    09/12/22 1230   Description of Altered Skin Integrity Partial thickness tissue loss. Shallow open ulcer with a red or pink wound bed, without slough. Intact or Open/Ruptured Serum-filled blister. 09/12/22 1230   Dressing Appearance Moist drainage 09/13/22 1145   Drainage Amount Scant 09/13/22 1145   Drainage Characteristics/Odor Serosanguineous;No odor 09/13/22 1145   Appearance Red;Moist 09/13/22 1145   Tissue loss description Partial thickness 09/13/22 1145   Black (%), Wound Tissue Color 0 % 09/12/22 1230   Red (%), Wound Tissue Color 99 % 09/12/22 1230   Yellow (%), Wound Tissue Color 0 % 09/12/22 1230   Periwound Area Dry;Indurated 09/13/22 1145   Wound Edges Undefined 09/13/22 1145   Wound Length (cm) 4 cm 09/12/22 1230   Wound Width (cm) 3 cm 09/12/22 1230   Wound Depth (cm) 0 cm 09/12/22 1230   Wound Volume (cm^3) 0 cm^3 09/12/22 1230   Wound Surface Area (cm^2) 12 cm^2 09/12/22 1230   Care Cleansed with:;Soap and water;Wound cleanser;Applied:;Removed:;Skin Barrier 09/13/22 1145   Dressing Removed;Applied;Changed;Hydrocolloid;Calcium alginate 09/13/22 1145   Dressing Change Due 09/14/22 09/13/22 1145   Number of days: 44            Altered Skin Integrity 09/05/22 0900 Right posterior Wrist #1 Blister(s)  (Active)   09/05/22 0900   Altered Skin Integrity Present on Admission: suspected hospital acquired   Side: Right   Orientation: posterior   Location: Wrist   Wound Number: #1   Is this injury device related?:    Primary Wound Type: Blister(s)   Description of Altered Skin Integrity:    Ankle-Brachial Index:    Pulses:    Removal Indication and Assessment:    Wound Outcome:    (Retired) Wound Length (cm):    (Retired) Wound Width (cm):    (Retired) Depth (cm):    Wound Description (Comments):    Removal Indications:    Wound Image    09/12/22 1230   Description of Altered Skin Integrity Partial thickness tissue loss. Shallow open ulcer with a red or pink wound bed, without slough. Intact or Open/Ruptured Serum-filled blister. 09/12/22 1230   Dressing Appearance Moist drainage 09/12/22 1230   Drainage Amount Scant 09/12/22 1230   Drainage Characteristics/Odor Serous;No odor 09/12/22 1230   Appearance Pink;Moist 09/12/22 1230   Tissue loss description Partial thickness 09/12/22 1230   Black (%), Wound Tissue Color 0 % 09/12/22 1230   Red (%), Wound Tissue Color 99 % 09/12/22 1230   Yellow (%), Wound Tissue Color 0 % 09/12/22 1230   Periwound Area Dry 09/12/22 1230   Wound Edges Defined 09/12/22 1230   Wound Length (cm) 1 cm 09/12/22 1230   Wound Width (cm) 1 cm 09/12/22 1230   Wound Depth (cm) 0 cm 09/12/22 1230   Wound Volume (cm^3) 0 cm^3 09/12/22 1230   Wound Surface Area (cm^2) 1 cm^2 09/12/22 1230   Care Cleansed with:;Soap and water 09/12/22 1230   Dressing Transparent film 09/13/22 1145   Number of days: 40            Altered Skin Integrity 09/05/22 1400 Left lateral;lower Breast Skin Tear (Active)   09/05/22 1400   Altered Skin Integrity Present on Admission: suspected hospital acquired   Side: Left   Orientation: lateral;lower   Location: Breast   Wound Number:    Is this injury device related?: No   Primary Wound Type: Skin tear   Description of Altered Skin Integrity:    Ankle-Brachial Index:    Pulses:     Removal Indication and Assessment:    Wound Outcome:    (Retired) Wound Length (cm):    (Retired) Wound Width (cm):    (Retired) Depth (cm):    Wound Description (Comments):    Removal Indications:    Wound Image    09/12/22 1230   Dressing Appearance Dry 09/12/22 1230   Drainage Amount None 09/12/22 1230   Appearance Pink;Dry 09/12/22 1230   Tissue loss description Partial thickness 09/12/22 1230   Black (%), Wound Tissue Color 0 % 09/12/22 1230   Red (%), Wound Tissue Color 100 % 09/12/22 1230   Yellow (%), Wound Tissue Color 0 % 09/12/22 1230   Periwound Area Dry 09/12/22 1230   Wound Edges Undefined 09/12/22 1230   Care Cleansed with:;Soap and water;Moisturizing agent 09/12/22 1230   Number of days: 40            Altered Skin Integrity 09/05/22 1600 Left upper Sternal Skin Tear (Active)   09/05/22 1600   Altered Skin Integrity Present on Admission: suspected hospital acquired   Side: Left   Orientation: upper   Location: Sternal   Wound Number:    Is this injury device related?: Yes   Primary Wound Type: Skin tear   Description of Altered Skin Integrity:    Ankle-Brachial Index:    Pulses:    Removal Indication and Assessment:    Wound Outcome:    (Retired) Wound Length (cm):    (Retired) Wound Width (cm):    (Retired) Depth (cm):    Wound Description (Comments):    Removal Indications:    Number of days: 40            Altered Skin Integrity 09/05/22 1600 Left upper Sternal #2 Skin Tear (Active)   09/05/22 1600   Altered Skin Integrity Present on Admission: suspected hospital acquired   Side: Left   Orientation: upper   Location: Sternal   Wound Number: #2   Is this injury device related?: Yes   Primary Wound Type: Skin tear   Description of Altered Skin Integrity:    Ankle-Brachial Index:    Pulses:    Removal Indication and Assessment:    Wound Outcome:    (Retired) Wound Length (cm):    (Retired) Wound Width (cm):    (Retired) Depth (cm):    Wound Description (Comments):    Removal Indications:    Number of  days: 40            Altered Skin Integrity 09/08/22 1230 Right lateral Buttocks Skin Tear (Active)   09/08/22 1230   Altered Skin Integrity Present on Admission:    Side: Right   Orientation: lateral   Location: Buttocks   Wound Number:    Is this injury device related?: No   Primary Wound Type: Skin tear   Description of Altered Skin Integrity:    Ankle-Brachial Index:    Pulses:    Removal Indication and Assessment:    Wound Outcome:    (Retired) Wound Length (cm):    (Retired) Wound Width (cm):    (Retired) Depth (cm):    Wound Description (Comments):    Removal Indications:    Wound Image    09/12/22 1230   Description of Altered Skin Integrity Full thickness tissue loss. Base is covered by slough and/or eschar in the wound bed 09/12/22 1230   Dressing Appearance Dried drainage 09/13/22 1145   Drainage Amount Scant 09/13/22 1145   Drainage Characteristics/Odor Serous;[place] 09/13/22 1145   Appearance [place];Moist 09/13/22 1145   Tissue loss description Not applicable 09/13/22 1145   Black (%), Wound Tissue Color 0 % 09/12/22 1230   Red (%), Wound Tissue Color 0 % 09/12/22 1230   Yellow (%), Wound Tissue Color 100 % 09/12/22 1230   Wound Edges Undefined 09/13/22 1145   Care Cleansed with:;Soap and water;Wound cleanser;Applied:;Removed:;Skin Barrier 09/13/22 1145   Dressing Removed;Applied;Changed;Collagen;Gauze, wet to moist;Gauze 09/13/22 1145   Dressing Change Due 09/14/22 09/13/22 1145   Number of days: 37            Altered Skin Integrity 09/12/22 1230 Left anterior;lower;distal Leg (Active)   09/12/22 1230   Altered Skin Integrity Present on Admission:    Side: Left   Orientation: anterior;lower;distal   Location: Leg   Wound Number:    Is this injury device related?:    Primary Wound Type:    Description of Altered Skin Integrity:    Ankle-Brachial Index:    Pulses:    Removal Indication and Assessment:    Wound Outcome:    (Retired) Wound Length (cm):    (Retired) Wound Width (cm):    (Retired) Depth (cm):     Wound Description (Comments):    Removal Indications:    Wound Image    09/12/22 1230   Dressing Appearance Open to air;Dry 09/12/22 1230   Drainage Amount None 09/12/22 1230   Appearance Tan;Slough 09/12/22 1230   Tissue loss description Not applicable 09/12/22 1230   Yellow (%), Wound Tissue Color 99 % 09/12/22 1230   Periwound Area Excoriated 09/12/22 1230   Wound Edges Undefined 09/12/22 1230   Care Cleansed with:;Soap and water 09/12/22 1230   Dressing Removed;Applied;Changed;Collagen;Gauze 09/12/22 1230   Dressing Change Due 09/13/22 09/12/22 1230   Number of days: 33         Assessment and Plan      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  Clean with vashe  Apply vashe moistened drawtex to wound bed  Cover with secure wit 4x4s, abd pad, and paper tape  Change daily and PRN for drainage  Cultures positive for Escherichia coli Proteus mirabilis Staphylococcus aureus Enterococcus faecalis   IV antibiotics     Pressure injury of buttock, unstageable  Bedside debridement today  Clean wound with vashe  Apply sensicare around wound  Apply santyl (esther thickness) to wound bed, cover with vashe moistened 4x4  Cover and secure with abd pad and paper tape  Change daily and PRN for soilage  Turn every two hours  Low air loss mattress  Keep pressure off wound  TAP system      Diabetic ulcer of right heel associated with type 2 diabetes mellitus  Clean wound with vashe  Apply betadine to heel  Cover and secure with 4x4s, oralia, and paper tape  Change daily and PRN for soilage  Cultures positive for Proteus mirabilis, Escherichia coli, and Enterococcus faecalis  Consider surgery consult for debridement vs. amputation          Signature:  KARELY Fonseca  Wound Care    Date of encounter: 09/13/2022

## 2022-09-13 NOTE — ASSESSMENT & PLAN NOTE
Dialysis TTS  Continue with scheduled hemodialysis.  9/7/2022  Dialysis as scheduled.  9/8/2022 At this time, continue with scheduled dialysis.  9/9/2022  Dialysis as scheduled  9/10/2022  Continue current treatment  9/11/2022 No other changes with dialysis treatment.  9/12/2022  Dialysis as scheduled.  9/13/2022  Continue with dialysis

## 2022-09-13 NOTE — PT/OT/SLP PROGRESS
Occupational Therapy   Treatment    Name: Michelle Nunes  MRN: [id number]  Admitting Diagnosis:  Acute hypercapnic respiratory failure       Recommendations:     Discharge Recommendations: nursing facility, skilled, intermediate care facility/nursing home  Discharge Equipment Recommendations:  none  Barriers to discharge:       Assessment:     Michelle Nunes is a 66 y.o. female with a medical diagnosis of Acute hypercapnic respiratory failure.   Performance deficits affecting function are weakness, impaired endurance.     Rehab Prognosis:  Fair; patient would benefit from acute skilled OT services to address these deficits and reach maximum level of function.       Plan:     Patient to be seen 5 x/week to address the above listed problems via self-care/home management, therapeutic activities, therapeutic exercises  Plan of Care Expires: 09/12/22  Plan of Care Reviewed with: patient    Subjective     Pain/Comfort:  Pain Rating 1: 9/10  Location 1: hip  Pain Addressed 1: Nurse notified, Distraction    Objective:     Communicated with: JAY Zamora prior to session.  Patient found HOB elevated with blood pressure cuff, peripheral IV, pulse ox (continuous), telemetry upon OT entry to room.    General Precautions: Standard, fall, respiratory   Orthopedic Precautions:N/A   Braces: N/A  Respiratory Status: Nasal cannula, flow 3 L/min     Occupational Performance:     Bed Mobility:      Functional Mobility/Transfers:    Functional Mobility:   Activities of Daily Living:  Pt washed her face and hands with set up  Nurse stated that pt was dep for feeding'      [facility] 6 Click ADL:      Treatment & Education:  Pt performed aarom with 1 lb wt 15 reps x 2 B shld flex,abd/add,elbow flex/ext, wrist flex/ext    Patient left HOB elevated with all lines intact and call button in reach    GOALS:   Multidisciplinary Problems       Occupational Therapy Goals          Problem: Occupational Therapy    Goal Priority Disciplines  Outcome Interventions   Occupational Therapy Goal     OT, PT/OT Ongoing, Not Progressing    Description: STG:  Pt will perform grooming with setup  Pt will bathe with setup and min(A) for upper body anterior  Pt will perform UE dressing with setup  Pt will perform self- feeding with setup  Pt will perform HEP independently  Pt will tolerate 20 minutes of tx without fatigue      LT.Restore to max I with self care and mobility.                          Time Tracking:     OT Date of Treatment: 22  OT Start Time: 828  OT Stop Time: 857  OT Total Time (min): 29 min    Billable Minutes:Therapeutic Exercise 20    OT/TARYN: TARYN          2022

## 2022-09-13 NOTE — PROGRESS NOTES
[facility] Medicine  Progress Note    Patient Name: Michelle Nunes  MRN: [id number]  Patient Class: IP- Inpatient   Admission Date: 8/27/2022  Length of Stay: 17 days  Attending Physician: Jorge Hung MD  Primary Care Provider: Primary Doctor No        Subjective:     Principal Problem:Acute hypercapnic respiratory failure        HPI:  65yo admitted to [facility] from [facility]. Patient with infected left amputation stump, right heel ulcer and sacral ulcer. Wound culture reveals E.Coli and Proteus both of which are sensitive to Azactam. Patient previously on Midodrine outpatient for hypotension. This has been continued on this in [facility] and will continue in [facility]. Patient with ESRD and will continue her Dialysis of TTS. Patient on Eliquis for atrial fibrillation. Patient with ROXANN and uses CPAP at home      Overview/Hospital Course:  09/10 Patient asking to go home. Oxygen nasal cannula during the day. BIPAP hs. Patient with ROXANN and doesn't have CPAP at home yet. ESRD with dialysis MWF. Left AKA. Blood pressure waxes and wanes depending on patient's mood. /80. Midodrine tid. IV antibiotics discontinued  09/11 Patient stated on yesterday that she wants to go home. She even said she would sign AMA papers to go home. She agreed  to deferring until arrangements for CPAP at home. Met with patient and her daughter, Barbi on yesterday with Elaina Zamora RN and Renea Charlton RN Case manager. The conversation went bad with patient threatening to have me shot if she was not able to go home by Monday, 09/12. Her dialysis schedule is MWF. Continue to monitor BP along with BIPAP  hs.   09/12 - records reviewed.  Patient presented with scattered wounds worse involves her right hip.  Has left AKA.  Nonambulatory.  Lives at home with family caring for her in the past.  Patient is wanting to go home.  Daughter states cannot care for her  and wants eventually long-term placement.  Patient being set up for home trilogy machine but she states she had had CPAP under direction Dr. Collins in the past.  On MWF dialysis; has had dialysis today.  Has completed IV antibiotic.  Apparently had refused surgery on right heel.  Will have Dr. Hanson review.  Previously been followed outpatient at [facility] center.   PMHx: HTN, blind right eye, A-Fib ( when seen), DM T2, GERD, CAD and non ambulatory which she states is from diabetic neuropathy.  Will discuss with .   09/13- no new issues.  Patient wanting to go home.  Family states they can not care for home and looking at long-term placement.  Nursing staff states she is having frequent pain complaints with wounds.  Patient states she is willing to have her foot amputated but wants it done at [facility].  I have told her that leaving her right foot on will just get her sick down the road.       Interval History:     Review of Systems   Constitutional:  Negative for appetite change, fatigue and fever.   HENT:  Negative for congestion, hearing loss and trouble swallowing.    Eyes:  Positive for visual disturbance.   Respiratory:  Positive for shortness of breath. Negative for chest tightness and wheezing.    Cardiovascular:  Negative for chest pain and palpitations.   Gastrointestinal:  Negative for abdominal pain, constipation and nausea.   Genitourinary:  Negative for difficulty urinating and dysuria.   Musculoskeletal:  Positive for gait problem. Negative for back pain and neck stiffness.   Skin:  Positive for wound. Negative for pallor and rash.   Neurological:  Positive for numbness. Negative for dizziness, speech difficulty and headaches.   Psychiatric/Behavioral:  Positive for agitation and sleep disturbance. Negative for confusion and suicidal ideas.    Objective:     Vital Signs (Most Recent):  Temp: 98 °F (36.7 °C) (09/13/22 1100)  Pulse: (!) 218 (09/13/22 1300)  Resp: (!)  9 (09/13/22 1300)  BP: 90/67 (09/13/22 1300)  SpO2: (!) 58 % (09/13/22 1300)   Vital Signs (24h Range):  Temp:  [97.3 °F (36.3 °C)-98.6 °F (37 °C)] 98 °F (36.7 °C)  Pulse:  [] 218  Resp:  [9-24] 9  SpO2:  [24 %-100 %] 58 %  BP: ()/() 90/67     Weight: 128.5 kg (283 lb 4.7 oz) (bed scale without box at foot of bed)  Body mass index is 50.18 kg/m².    Intake/Output Summary (Last 24 hours) at 9/13/2022 1503  Last data filed at 9/13/2022 0800  Gross per 24 hour   Intake 120 ml   Output --   Net 120 ml        Physical Exam  Vitals reviewed.   Constitutional:       General: She is not in acute distress.     Appearance: She is morbidly obese.   Eyes:      Pupils: Pupils are equal, round, and reactive to light.   Cardiovascular:      Rate and Rhythm: Tachycardia present. Rhythm irregular.      Pulses: Normal pulses.   Pulmonary:      Effort: Pulmonary effort is normal. No respiratory distress.      Breath sounds: Normal breath sounds. No wheezing.   Abdominal:      General: Bowel sounds are normal. There is no distension.      Tenderness: There is no abdominal tenderness.   Musculoskeletal:        Legs:    Skin:     General: Skin is warm.   Neurological:      General: No focal deficit present.      Mental Status: She is alert, oriented to person, place, and time and easily aroused. Mental status is at baseline.   Psychiatric:         Mood and Affect: Mood normal.         Behavior: Behavior normal.       Significant Labs: All pertinent labs within the past 24 hours have been reviewed.  BMP:   Recent Labs   Lab 09/12/22  0951   *      K 4.4      CO2 28   BUN 40*   CREATININE 5.31*   CALCIUM 8.5       CBC:   Recent Labs   Lab 09/12/22  0951   WBC 11.36*   HGB 7.1*   HCT 23.1*          CMP:   Recent Labs   Lab 09/12/22  0951      K 4.4      CO2 28   *   BUN 40*   CREATININE 5.31*   CALCIUM 8.5   ANIONGAP 12         Significant Imaging: I have reviewed all pertinent  imaging results/findings within the past 24 hours.    Intake/Output - Last 3 Shifts         09/11 0700  09/12 0659 09/12 0700 09/13 0659 09/13 0700  09/14 0659    P.O. 360 120 0    Other  500     Total Intake(mL/kg) 360 (2.7) 620 (4.8) 0 (0)    Other  2500     Total Output  2500     Net +360 -1880 0                   Microbiology Results (last 7 days)       ** No results found for the last 168 hours. **                Assessment/Plan:      * Acute hypercapnic respiratory failure  Continue with BiPAP at night / when sleeping    Morbid obesity with BMI of 50.0-59.9, adult  Body mass index is 51.25 kg/m². Morbid obesity complicates all aspects of disease management from diagnostic modalities to treatment. Weight loss encouraged and health benefits explained to patient.         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 Patient is currently in atrial fibrillation.[id number] Score: 3. HASBLED Score: Unable to calculate. Anticoagulation indicated. Anticoagulation done with eliquis.        Pressure injury of buttock, unstageable    Continue wound care    HTN (hypertension)    Monitor blood pressure and adjust medicines as needed    Type 2 diabetes mellitus  Patient's FSGs are controlled on current medication regimen.  Last A1c reviewed-   Lab Results   Component Value Date    HGBA1C 5.3 08/25/2022     Most recent fingerstick glucose reviewed- No results for input(s): POCTGLUCOSE in the last 24 hours.  Current correctional scale  Medium  Maintain anti-hyperglycemic dose as follows-   Antihyperglycemics (From admission, onward)    Start     Stop Route Frequency Ordered     09/09/22 2100  insulin detemir U-100 injection 10 Units         -- SubQ Nightly 09/09/22 0948    08/27/22 1430  insulin aspart U-100 injection 0-5 Units         -- SubQ Before meals & nightly PRN 08/27/22 1431        No Oral hypoglycemics with patient having renal failure.    ESRD on dialysis  Continue with dialysis      Deep vein thrombosis (DVT) of lower extremity        Diabetic ulcer of right heel associated with type 2 diabetes mellitus  Patient's FSGs are controlled on current medication regimen.  Last A1c reviewed-   Lab Results   Component Value Date    HGBA1C 5.3 08/25/2022     Most recent fingerstick glucose reviewed- No results for input(s): POCTGLUCOSE in the last 24 hours.  Current correctional scale  Medium  Maintain anti-hyperglycemic dose as follows-   Antihyperglycemics (From admission, onward)    Start     Stop Route Frequency Ordered    09/09/22 2100  insulin detemir U-100 injection 10 Units         -- SubQ Nightly 09/09/22 0948    08/27/22 1430  insulin aspart U-100 injection 0-5 Units         -- SubQ Before meals & nightly PRN 08/27/22 1431        No Oral hypoglycemics with patient's renal failure.    AKA recommended but patient refused      VTE Risk Mitigation (From admission, onward)         Ordered     apixaban tablet 2.5 mg  2 times daily         08/27/22 1431     Place sequential compression device  Until discontinued         08/27/22 1431     heparin (porcine) injection 4,000 Units  Use PRN         08/27/22 1431                Discharge Planning   JUN:      Code Status: Full Code   Is the patient medically ready for discharge?:     Reason for patient still in hospital (select all that apply): Laboratory test, Treatment and Imaging  Discharge Plan A: Skilled Nursing Facility                  Jorge Hung MD  Department of Hospital Medicine   [facility]

## 2022-09-13 NOTE — SUBJECTIVE & OBJECTIVE
Interval History: The patient is in a good mood today.  She voices no complaints.  Her family is at the bedside.    Review of patient's allergies indicates:   Allergen Reactions    Milk containing products     Pcn [penicillins]      Current Facility-Administered Medications   Medication Frequency    0.9%  NaCl infusion PRN    acetaminophen tablet 650 mg Q4H PRN    allopurinoL tablet 100 mg Daily    apixaban tablet 2.5 mg BID    aspirin EC tablet 81 mg Daily    atorvastatin tablet 40 mg QHS    bisacodyL EC tablet 10 mg Daily PRN    collagenase ointment Daily PRN    dextromethorphan-guaiFENesin  mg/5 ml liquid 10 mL Q6H PRN    dextrose 50% injection 12.5 g PRN    dextrose 50% injection 25 g PRN    EScitalopram oxalate tablet 10 mg QHS    folic acid tablet 1,000 mcg QAM    gabapentin capsule 200 mg QHS    glucagon (human recombinant) injection 1 mg PRN    heparin (porcine) injection 4,000 Units PRN    HYDROcodone-acetaminophen  mg per tablet 1 tablet Q8H PRN    hydrocortisone suppository 25 mg BID    hydrOXYzine pamoate capsule 25 mg Q6H PRN    insulin aspart U-100 injection 0-5 Units QID (AC + HS) PRN    insulin detemir U-100 injection 10 Units QHS    levothyroxine tablet 100 mcg QAM    megestroL tablet 40 mg QAM    melatonin tablet 3 mg QHS    midodrine tablet 10 mg TID    ondansetron injection 4 mg Q8H PRN    pantoprazole EC tablet 40 mg Daily    simethicone chewable tablet 80 mg TID PRN    sodium chloride 0.9% flush 10 mL Q12H PRN    traZODone tablet 50 mg Nightly PRN    vitamin D 1000 units tablet 5,000 Units Daily       Objective:     Vital Signs (Most Recent):  Temp: 98.5 °F (36.9 °C) (09/13/22 1500)  Pulse: 98 (09/13/22 1600)  Resp: 13 (09/13/22 1600)  BP: 126/66 (09/13/22 1600)  SpO2: 96 % (09/13/22 1600)  O2 Device (Oxygen Therapy): nasal cannula w/ humidification (09/13/22 0830) Vital Signs (24h Range):  Temp:  [97.3 °F (36.3 °C)-98.6 °F (37 °C)] 98.5 °F (36.9 °C)  Pulse:  [] 98  Resp:   [9-19] 13  SpO2:  [24 %-100 %] 96 %  BP: ()/(26-93) 126/66     Weight: 128.5 kg (283 lb 4.7 oz) (bed scale without box at foot of bed) (09/13/22 0400)  Body mass index is 50.18 kg/m².  Body surface area is 2.39 meters squared.    I/O last 3 completed shifts:  In: 620 [P.O.:120; Other:500]  Out: 2500 [Other:2500]    Physical Exam  Vitals reviewed.   Constitutional:       Appearance: She is obese.   HENT:      Head: Atraumatic.   Cardiovascular:      Rate and Rhythm: Regular rhythm.      Pulses: Normal pulses.   Pulmonary:      Effort: Pulmonary effort is normal.   Abdominal:      Palpations: Abdomen is soft.   Neurological:      Mental Status: She is alert.   Psychiatric:         Mood and Affect: Mood normal.       Significant Labs:  BMP:   Recent Labs   Lab 09/12/22  0951   *      K 4.4      CO2 28   BUN 40*   CREATININE 5.31*   CALCIUM 8.5     CBC:   Recent Labs   Lab 09/12/22  0951   WBC 11.36*   RBC 2.34*   HGB 7.1*   HCT 23.1*      MCV 98.7*   MCH 30.3   MCHC 30.7*        Significant Imaging:  Labs: Reviewed

## 2022-09-13 NOTE — SUBJECTIVE & OBJECTIVE
Interval History:     Review of Systems   Constitutional:  Negative for appetite change, fatigue and fever.   HENT:  Negative for congestion, hearing loss and trouble swallowing.    Eyes:  Positive for visual disturbance.   Respiratory:  Positive for shortness of breath. Negative for chest tightness and wheezing.    Cardiovascular:  Negative for chest pain and palpitations.   Gastrointestinal:  Negative for abdominal pain, constipation and nausea.   Genitourinary:  Negative for difficulty urinating and dysuria.   Musculoskeletal:  Positive for gait problem. Negative for back pain and neck stiffness.   Skin:  Positive for wound. Negative for pallor and rash.   Neurological:  Positive for numbness. Negative for dizziness, speech difficulty and headaches.   Psychiatric/Behavioral:  Positive for agitation and sleep disturbance. Negative for confusion and suicidal ideas.    Objective:     Vital Signs (Most Recent):  Temp: 98 °F (36.7 °C) (09/13/22 1100)  Pulse: (!) 218 (09/13/22 1300)  Resp: (!) 9 (09/13/22 1300)  BP: 90/67 (09/13/22 1300)  SpO2: (!) 58 % (09/13/22 1300)   Vital Signs (24h Range):  Temp:  [97.3 °F (36.3 °C)-98.6 °F (37 °C)] 98 °F (36.7 °C)  Pulse:  [] 218  Resp:  [9-24] 9  SpO2:  [24 %-100 %] 58 %  BP: ()/() 90/67     Weight: 128.5 kg (283 lb 4.7 oz) (bed scale without box at foot of bed)  Body mass index is 50.18 kg/m².    Intake/Output Summary (Last 24 hours) at 9/13/2022 1503  Last data filed at 9/13/2022 0800  Gross per 24 hour   Intake 120 ml   Output --   Net 120 ml        Physical Exam  Vitals reviewed.   Constitutional:       General: She is not in acute distress.     Appearance: She is morbidly obese.   Eyes:      Pupils: Pupils are equal, round, and reactive to light.   Cardiovascular:      Rate and Rhythm: Tachycardia present. Rhythm irregular.      Pulses: Normal pulses.   Pulmonary:      Effort: Pulmonary effort is normal. No respiratory distress.      Breath sounds: Normal  breath sounds. No wheezing.   Abdominal:      General: Bowel sounds are normal. There is no distension.      Tenderness: There is no abdominal tenderness.   Musculoskeletal:        Legs:    Skin:     General: Skin is warm.   Neurological:      General: No focal deficit present.      Mental Status: She is alert, oriented to person, place, and time and easily aroused. Mental status is at baseline.   Psychiatric:         Mood and Affect: Mood normal.         Behavior: Behavior normal.       Significant Labs: All pertinent labs within the past 24 hours have been reviewed.  BMP:   Recent Labs   Lab 09/12/22  0951   *      K 4.4      CO2 28   BUN 40*   CREATININE 5.31*   CALCIUM 8.5       CBC:   Recent Labs   Lab 09/12/22  0951   WBC 11.36*   HGB 7.1*   HCT 23.1*          CMP:   Recent Labs   Lab 09/12/22  0951      K 4.4      CO2 28   *   BUN 40*   CREATININE 5.31*   CALCIUM 8.5   ANIONGAP 12         Significant Imaging: I have reviewed all pertinent imaging results/findings within the past 24 hours.    Intake/Output - Last 3 Shifts         09/11 0700  09/12 0659 09/12 0700 09/13 0659 09/13 0700  09/14 0659    P.O. 360 120 0    Other  500     Total Intake(mL/kg) 360 (2.7) 620 (4.8) 0 (0)    Other  2500     Total Output  2500     Net +360 -1880 0                   Microbiology Results (last 7 days)       ** No results found for the last 168 hours. **

## 2022-09-13 NOTE — PROCEDURES
"Michelle Nunes is a 66 y.o. female patient.    Temp: 98 °F (36.7 °C) (09/13/22 1100)  Pulse: 103 (09/13/22 1100)  Resp: 11 (09/13/22 1100)  BP: (!) 48/26 (09/13/22 1100)  SpO2: 100 % (09/13/22 0600)  Weight: 128.5 kg (283 lb 4.7 oz) (bed scale without box at foot of bed) (09/13/22 0400)  Height: 5' 3" (160 cm) (09/06/22 1302)       Debridement    Date/Time: 9/13/2022 11:40 AM  Performed by: KARELY Fonseca  Authorized by: KARELY Fonseca     Consent Done?:  Yes (Written)    Wound Details:    Location:  Right buttock    Type of Debridement:  Excisional       Length (cm):  5       Area (sq cm):  30       Width (cm):  6       Percent Debrided (%):  100       Depth (cm):  1       Total Area Debrided (sq cm):  30    Depth of debridement:  Subcutaneous tissue    Tissue debrided:  Subcutaneous    Devitalized tissue debrided:  Clots, Exudate, Slough and Necrotic/Eschar    Instruments:  Scissors    Bleeding:  Minimal  Hemostasis Achieved: Yes    Method Used:  Pressure  Patient tolerance:  Patient tolerated the procedure well with no immediate complications     Assistant ANJELICA Diehl LPN    9/13/2022    "

## 2022-09-13 NOTE — PROGRESS NOTES
[facility]  Nephrology  Progress Note    Patient Name: Michelle Nunes  MRN: [id number]  Admission Date: 8/27/2022  Hospital Length of Stay: 17 days  Attending Provider: Jorge Hung MD   Primary Care Physician: Primary Doctor No  Principal Problem:Acute hypercapnic respiratory failure    Subjective:     HPI: This patient is known to this nephrology service from a previous consult.  She has a history of ESRD due to HTN and DM.  She has a history of PVD and is s/p left AKA.  The patient presented with a wound on the right foot.  She has required broad spectrum antibiotics.  She has been transferred to [facility] for [facility] care.  She is on pressor medications.      Interval History: The patient is in a good mood today.  She voices no complaints.  Her family is at the bedside.    Review of patient's allergies indicates:   Allergen Reactions    Milk containing products     Pcn [penicillins]      Current Facility-Administered Medications   Medication Frequency    0.9%  NaCl infusion PRN    acetaminophen tablet 650 mg Q4H PRN    allopurinoL tablet 100 mg Daily    apixaban tablet 2.5 mg BID    aspirin EC tablet 81 mg Daily    atorvastatin tablet 40 mg QHS    bisacodyL EC tablet 10 mg Daily PRN    collagenase ointment Daily PRN    dextromethorphan-guaiFENesin  mg/5 ml liquid 10 mL Q6H PRN    dextrose 50% injection 12.5 g PRN    dextrose 50% injection 25 g PRN    EScitalopram oxalate tablet 10 mg QHS    folic acid tablet 1,000 mcg QAM    gabapentin capsule 200 mg QHS    glucagon (human recombinant) injection 1 mg PRN    heparin (porcine) injection 4,000 Units PRN    HYDROcodone-acetaminophen  mg per tablet 1 tablet Q8H PRN    hydrocortisone suppository 25 mg BID    hydrOXYzine pamoate capsule 25 mg Q6H PRN    insulin aspart U-100 injection 0-5 Units QID (AC + HS) PRN    insulin detemir U-100 injection 10 Units QHS    levothyroxine tablet 100 mcg QAM     megestroL tablet 40 mg QAM    melatonin tablet 3 mg QHS    midodrine tablet 10 mg TID    ondansetron injection 4 mg Q8H PRN    pantoprazole EC tablet 40 mg Daily    simethicone chewable tablet 80 mg TID PRN    sodium chloride 0.9% flush 10 mL Q12H PRN    traZODone tablet 50 mg Nightly PRN    vitamin D 1000 units tablet 5,000 Units Daily       Objective:     Vital Signs (Most Recent):  Temp: 98.5 °F (36.9 °C) (09/13/22 1500)  Pulse: 98 (09/13/22 1600)  Resp: 13 (09/13/22 1600)  BP: 126/66 (09/13/22 1600)  SpO2: 96 % (09/13/22 1600)  O2 Device (Oxygen Therapy): nasal cannula w/ humidification (09/13/22 0830) Vital Signs (24h Range):  Temp:  [97.3 °F (36.3 °C)-98.6 °F (37 °C)] 98.5 °F (36.9 °C)  Pulse:  [] 98  Resp:  [9-19] 13  SpO2:  [24 %-100 %] 96 %  BP: ()/(26-93) 126/66     Weight: 128.5 kg (283 lb 4.7 oz) (bed scale without box at foot of bed) (09/13/22 0400)  Body mass index is 50.18 kg/m².  Body surface area is 2.39 meters squared.    I/O last 3 completed shifts:  In: 620 [P.O.:120; Other:500]  Out: 2500 [Other:2500]    Physical Exam  Vitals reviewed.   Constitutional:       Appearance: She is obese.   HENT:      Head: Atraumatic.   Cardiovascular:      Rate and Rhythm: Regular rhythm.      Pulses: Normal pulses.   Pulmonary:      Effort: Pulmonary effort is normal.   Abdominal:      Palpations: Abdomen is soft.   Neurological:      Mental Status: She is alert.   Psychiatric:         Mood and Affect: Mood normal.       Significant Labs:  BMP:   Recent Labs   Lab 09/12/22  0951   *      K 4.4      CO2 28   BUN 40*   CREATININE 5.31*   CALCIUM 8.5     CBC:   Recent Labs   Lab 09/12/22  0951   WBC 11.36*   RBC 2.34*   HGB 7.1*   HCT 23.1*      MCV 98.7*   MCH 30.3   MCHC 30.7*        Significant Imaging:  Labs: Reviewed    Assessment/Plan:     ESRD on dialysis  Dialysis TTS  Continue with scheduled hemodialysis.  9/7/2022  Dialysis as scheduled.  9/8/2022 At this time,  continue with scheduled dialysis.  9/9/2022  Dialysis as scheduled  9/10/2022  Continue current treatment  9/11/2022 No other changes with dialysis treatment.  9/12/2022  Dialysis as scheduled.  9/13/2022  Continue with dialysis        Thank you for your consult. I will follow-up with patient. Please contact us if you have any additional questions.    Jef Romero Jr, MD  Nephrology  [facility]

## 2022-09-13 NOTE — PLAN OF CARE
Plans of care ongoing  Problem: Adult Inpatient Plan of Care  Goal: Plan of Care Review  Outcome: Ongoing, Progressing  Goal: Patient-Specific Goal (Individualized)  Outcome: Ongoing, Progressing  Goal: Absence of Hospital-Acquired Illness or Injury  Outcome: Ongoing, Progressing  Goal: Optimal Comfort and Wellbeing  Outcome: Ongoing, Progressing  Goal: Readiness for Transition of Care  Outcome: Ongoing, Progressing     Problem: Diabetes Comorbidity  Goal: Blood Glucose Level Within Targeted Range  Outcome: Ongoing, Progressing     Problem: Bariatric Environmental Safety  Goal: Safety Maintained with Care  Outcome: Ongoing, Progressing     Problem: Infection  Goal: Absence of Infection Signs and Symptoms  Outcome: Ongoing, Progressing     Problem: Impaired Wound Healing  Goal: Optimal Wound Healing  Outcome: Ongoing, Progressing     Problem: Device-Related Complication Risk (Hemodialysis)  Goal: Safe, Effective Therapy Delivery  Outcome: Ongoing, Progressing     Problem: Hemodynamic Instability (Hemodialysis)  Goal: Effective Tissue Perfusion  Outcome: Ongoing, Progressing     Problem: Infection (Hemodialysis)  Goal: Absence of Infection Signs and Symptoms  Outcome: Ongoing, Progressing     Problem: Fall Injury Risk  Goal: Absence of Fall and Fall-Related Injury  Outcome: Ongoing, Progressing     Problem: Skin Injury Risk Increased  Goal: Skin Health and Integrity  Outcome: Ongoing, Progressing     Problem: Noninvasive Ventilation Acute  Goal: Effective Unassisted Ventilation and Oxygenation  Outcome: Ongoing, Progressing     Problem: Restraint, Nonbehavioral (Nonviolent)  Goal: Absence of Harm or Injury  Outcome: Ongoing, Progressing     Problem: Gas Exchange Impaired  Goal: Optimal Gas Exchange  Outcome: Ongoing, Progressing

## 2022-09-14 VITALS
HEART RATE: 101 BPM | OXYGEN SATURATION: 100 % | SYSTOLIC BLOOD PRESSURE: 109 MMHG | WEIGHT: 282.44 LBS | HEIGHT: 63 IN | RESPIRATION RATE: 20 BRPM | DIASTOLIC BLOOD PRESSURE: 47 MMHG | TEMPERATURE: 99 F | BODY MASS INDEX: 50.04 KG/M2

## 2022-09-14 PROBLEM — J96.02 ACUTE HYPERCAPNIC RESPIRATORY FAILURE: Status: RESOLVED | Noted: 2022-08-28 | Resolved: 2022-09-14

## 2022-09-14 PROBLEM — J96.10 CHRONIC RESPIRATORY FAILURE: Status: ACTIVE | Noted: 2022-09-14

## 2022-09-14 PROBLEM — G47.33 OBSTRUCTIVE SLEEP APNEA OF ADULT: Status: ACTIVE | Noted: 2022-09-14

## 2022-09-14 LAB
ANION GAP SERPL CALCULATED.3IONS-SCNC: 13 MMOL/L (ref 7–16)
BASOPHILS # BLD AUTO: 0.07 K/UL (ref 0–0.2)
BASOPHILS NFR BLD AUTO: 0.5 % (ref 0–1)
BUN SERPL-MCNC: 33 MG/DL (ref 7–18)
BUN/CREAT SERPL: 7 (ref 6–20)
CALCIUM SERPL-MCNC: 9.1 MG/DL (ref 8.5–10.1)
CHLORIDE SERPL-SCNC: 100 MMOL/L (ref 98–107)
CO2 SERPL-SCNC: 26 MMOL/L (ref 21–32)
CREAT SERPL-MCNC: 4.78 MG/DL (ref 0.55–1.02)
CRP SERPL-MCNC: 29.6 MG/DL (ref 0–0.8)
DIFFERENTIAL METHOD BLD: ABNORMAL
EGFR (NO RACE VARIABLE) (RUSH/TITUS): 10 ML/MIN/1.73M²
EOSINOPHIL # BLD AUTO: 0.06 K/UL (ref 0–0.5)
EOSINOPHIL NFR BLD AUTO: 0.4 % (ref 1–4)
ERYTHROCYTE [DISTWIDTH] IN BLOOD BY AUTOMATED COUNT: 17.9 % (ref 11.5–14.5)
FOLATE SERPL-MCNC: >20 NG/ML (ref 3.1–17.5)
GLUCOSE SERPL-MCNC: 123 MG/DL (ref 70–105)
GLUCOSE SERPL-MCNC: 168 MG/DL (ref 70–105)
GLUCOSE SERPL-MCNC: 172 MG/DL (ref 74–106)
HCT VFR BLD AUTO: 23.7 % (ref 38–47)
HGB BLD-MCNC: 7.3 G/DL (ref 12–16)
IMM GRANULOCYTES # BLD AUTO: 0.08 K/UL (ref 0–0.04)
IMM GRANULOCYTES NFR BLD: 0.6 % (ref 0–0.4)
LYMPHOCYTES # BLD AUTO: 1.04 K/UL (ref 1–4.8)
LYMPHOCYTES NFR BLD AUTO: 7.7 % (ref 27–41)
MCH RBC QN AUTO: 30.4 PG (ref 27–31)
MCHC RBC AUTO-ENTMCNC: 30.8 G/DL (ref 32–36)
MCV RBC AUTO: 98.8 FL (ref 80–96)
MONOCYTES # BLD AUTO: 0.97 K/UL (ref 0–0.8)
MONOCYTES NFR BLD AUTO: 7.1 % (ref 2–6)
MPC BLD CALC-MCNC: 10.6 FL (ref 9.4–12.4)
NEUTROPHILS # BLD AUTO: 11.37 K/UL (ref 1.8–7.7)
NEUTROPHILS NFR BLD AUTO: 83.7 % (ref 53–65)
NRBC # BLD AUTO: 0.03 X10E3/UL
NRBC, AUTO (.00): 0.2 %
PLATELET # BLD AUTO: 316 K/UL (ref 150–400)
POTASSIUM SERPL-SCNC: 4.2 MMOL/L (ref 3.5–5.1)
RBC # BLD AUTO: 2.4 M/UL (ref 4.2–5.4)
SODIUM SERPL-SCNC: 135 MMOL/L (ref 136–145)
T4 SERPL-MCNC: 6.3 ΜG/DL (ref 4.8–13.9)
TSH SERPL DL<=0.005 MIU/L-ACNC: 8.85 UIU/ML (ref 0.36–3.74)
VIT B12 SERPL-MCNC: 1067 PG/ML (ref 193–986)
WBC # BLD AUTO: 13.59 K/UL (ref 4.5–11)

## 2022-09-14 PROCEDURE — 36415 COLL VENOUS BLD VENIPUNCTURE: CPT | Performed by: HOSPITALIST

## 2022-09-14 PROCEDURE — 25000003 PHARM REV CODE 250: Performed by: FAMILY MEDICINE

## 2022-09-14 PROCEDURE — 27000221 HC OXYGEN, UP TO 24 HOURS

## 2022-09-14 PROCEDURE — 80048 BASIC METABOLIC PNL TOTAL CA: CPT | Performed by: HOSPITALIST

## 2022-09-14 PROCEDURE — 99239 HOSP IP/OBS DSCHRG MGMT >30: CPT | Mod: ,,, | Performed by: HOSPITALIST

## 2022-09-14 PROCEDURE — 84443 ASSAY THYROID STIM HORMONE: CPT | Performed by: HOSPITALIST

## 2022-09-14 PROCEDURE — 36591 DRAW BLOOD OFF VENOUS DEVICE: CPT | Performed by: HOSPITALIST

## 2022-09-14 PROCEDURE — 86140 C-REACTIVE PROTEIN: CPT | Performed by: HOSPITALIST

## 2022-09-14 PROCEDURE — 27000940

## 2022-09-14 PROCEDURE — 85025 COMPLETE CBC W/AUTO DIFF WBC: CPT | Performed by: HOSPITALIST

## 2022-09-14 PROCEDURE — 82746 ASSAY OF FOLIC ACID SERUM: CPT | Performed by: HOSPITALIST

## 2022-09-14 PROCEDURE — 94660 CPAP INITIATION&MGMT: CPT

## 2022-09-14 PROCEDURE — 82962 GLUCOSE BLOOD TEST: CPT

## 2022-09-14 PROCEDURE — 63600175 PHARM REV CODE 636 W HCPCS: Performed by: FAMILY MEDICINE

## 2022-09-14 PROCEDURE — 80100014 HC HEMODIALYSIS 1:1

## 2022-09-14 PROCEDURE — 84436 ASSAY OF TOTAL THYROXINE: CPT | Performed by: HOSPITALIST

## 2022-09-14 PROCEDURE — 99239 PR HOSPITAL DISCHARGE DAY,>30 MIN: ICD-10-PCS | Mod: ,,, | Performed by: HOSPITALIST

## 2022-09-14 PROCEDURE — 99900035 HC TECH TIME PER 15 MIN (STAT)

## 2022-09-14 RX ADMIN — HYDROCORTISONE ACETATE 25 MG: 25 SUPPOSITORY RECTAL at 08:09

## 2022-09-14 RX ADMIN — FOLIC ACID 1000 MCG: 1 TABLET ORAL at 06:09

## 2022-09-14 RX ADMIN — SODIUM CHLORIDE: 9 INJECTION, SOLUTION INTRAVENOUS at 10:09

## 2022-09-14 RX ADMIN — HYDROCODONE BITARTRATE AND ACETAMINOPHEN 1 TABLET: 10; 325 TABLET ORAL at 08:09

## 2022-09-14 RX ADMIN — LEVOTHYROXINE SODIUM 100 MCG: 100 TABLET ORAL at 06:09

## 2022-09-14 RX ADMIN — MIDODRINE HYDROCHLORIDE 10 MG: 5 TABLET ORAL at 08:09

## 2022-09-14 RX ADMIN — PANTOPRAZOLE SODIUM 40 MG: 40 TABLET, DELAYED RELEASE ORAL at 08:09

## 2022-09-14 RX ADMIN — HYDROXYZINE PAMOATE 25 MG: 25 CAPSULE ORAL at 10:09

## 2022-09-14 RX ADMIN — MEGESTROL ACETATE 40 MG: 40 TABLET ORAL at 06:09

## 2022-09-14 RX ADMIN — ASPIRIN 81 MG: 81 TABLET, COATED ORAL at 08:09

## 2022-09-14 RX ADMIN — Medication 5000 UNITS: at 08:09

## 2022-09-14 RX ADMIN — ALLOPURINOL 100 MG: 100 TABLET ORAL at 08:09

## 2022-09-14 RX ADMIN — HEPARIN SODIUM 4000 UNITS: 1000 INJECTION INTRAVENOUS; SUBCUTANEOUS at 10:09

## 2022-09-14 RX ADMIN — APIXABAN 2.5 MG: 2.5 TABLET, FILM COATED ORAL at 08:09

## 2022-09-14 NOTE — NURSING
Pt is being discharged awake, alert and oriented x3. All dressings are dry and intact. Left AKA noted.

## 2022-09-14 NOTE — PROGRESS NOTES
Pt d/c from [facility] today. Current weight is 282 lbs. Pt is currently receiving a Renal diet + Nepro nutritional supplements BID. Per flowsheets, pt with poor PO intake, consuming 0-25% of meals. Pt with multiple wounds that were being treated and pt receiving Matteo BID to aid in wound healing. Labs/meds reviewed. RD will sign off.

## 2022-09-14 NOTE — NURSING
At 1455, discharge instructions were discussed with pt daughter, Barbi. She voiced understanding. Instructed Barbi of follow up appointment, medications to be picked up at pharmacy and where to find this information on the paperwork. Barbi verbalized understanding.

## 2022-09-14 NOTE — DISCHARGE SUMMARY
[facility] Medicine  Discharge Summary      Patient Name: Michelle Nunes  MRN: [id number]  Patient Class: IP- Inpatient  Admission Date: 8/27/2022  Hospital Length of Stay: 18 days  Discharge Date and Time:  09/14/2022 12:43 PM  Attending Physician: Jorge Hung MD   Discharging Provider: Jorge Hung MD  Primary Care Provider: Primary Doctor No      HPI:   67yo admitted to [facility] from [facility]. Patient with infected left amputation stump, right heel ulcer and sacral ulcer. Wound culture reveals E.Coli and Proteus both of which are sensitive to Azactam. Patient previously on Midodrine outpatient for hypotension. This has been continued on this in [facility] and will continue in [facility]. Patient with ESRD and will continue her Dialysis of TTS. Patient on Eliquis for atrial fibrillation. Patient with ROXANN and uses CPAP at home      * No surgery found *      Hospital Course:     Principal Problem:Infection of amputation stump     Chief Complaint:       Chief Complaint   Patient presents with    Wound Infection         HPI: 67yo admitted to [facility] from [facility]. Patient with infected left amputation stump, right heel ulcer and sacral ulcer. Wound culture reveals E.Coli and Proteus both of which are sensitive to Azactam. Patient previously on Midodrine outpatient for hypotension. This has been continued on this in [facility] and will continue in [facility]. Patient with ESRD and will continue her Dialysis of TTS. Patient on Eliquis for atrial fibrillation. Patient with ROXANN and uses CPAP at home             Past Medical History:   Diagnosis Date    A-fib      Blind right eye      Diabetes mellitus      ESRD (end stage renal disease) on dialysis      GERD (gastroesophageal reflux disease)      Gout, unspecified      Heart attack      HTN (hypertension)      Kidney failure      Neuropathy                  Past Surgical History:   Procedure Laterality Date    CHOLECYSTECTOMY        HYSTERECTOMY        LEG AMPUTATION THROUGH KNEE Left                Review of patient's allergies indicates:   Allergen Reactions    Milk containing products      Pcn [penicillins]               Current Facility-Administered Medications on File Prior to Encounter   Medication    0.9%  NaCl infusion    allopurinoL tablet 100 mg    apixaban tablet 2.5 mg    aspirin EC tablet 81 mg    atorvastatin tablet 40 mg    aztreonam (AZACTAM) 1,000 mg in dextrose 5 % in water (D5W) 5 % 50 mL IVPB (MB+)    bisacodyL EC tablet 10 mg    cholecalciferol (vitamin D3) 125 mcg (5,000 unit) tablet 5,000 Units    collagenase ointment    dextromethorphan-guaiFENesin  mg/5 ml liquid 10 mL    dextrose 50% injection 12.5 g    dextrose 50% injection 25 g    EScitalopram oxalate tablet 10 mg    folic acid tablet 1,000 mcg    gabapentin capsule 200 mg    glucagon (human recombinant) injection 1 mg    heparin (porcine) injection 4,000 Units    HYDROcodone-acetaminophen  mg per tablet 1 tablet    insulin aspart U-100 injection 0-5 Units    insulin aspart U-100 injection 3 Units    insulin detemir U-100 injection 10 Units    levothyroxine tablet 100 mcg    megestroL tablet 40 mg    melatonin tablet 3 mg    midodrine tablet 10 mg    [COMPLETED] midodrine tablet 5 mg    mupirocin 2 % ointment    pantoprazole EC tablet 40 mg    simethicone chewable tablet 80 mg    sodium chloride 0.9% flush 10 mL    traZODone tablet 50 mg    [DISCONTINUED] acetaminophen tablet 1,000 mg    [DISCONTINUED] HYDROcodone-acetaminophen 7.5-325 mg per tablet 1 tablet    [DISCONTINUED] midodrine tablet 2.5 mg    [DISCONTINUED] midodrine tablet 5 mg    [DISCONTINUED] midodrine tablet 5 mg    [DISCONTINUED] ondansetron injection 8 mg    [DISCONTINUED] vancomycin - pharmacy to dose           Current Outpatient Medications on File Prior to  Encounter   Medication Sig    allopurinoL (ZYLOPRIM) 100 MG tablet Take 100 mg by mouth once daily.    amLODIPine (NORVASC) 5 MG tablet Take 1 tablet by mouth once daily.    aspirin (ECOTRIN) 81 MG EC tablet Take 81 mg by mouth once daily.    atorvastatin (LIPITOR) 40 MG tablet Take 40 mg by mouth every evening.    enoxaparin (LOVENOX) 120 mg/0.8 mL Syrg Inject 120 mg into the skin once daily.    EScitalopram oxalate (LEXAPRO) 10 MG tablet Take 10 mg by mouth every evening.    folic acid (FOLVITE) 1 MG tablet Take 1,000 mcg by mouth every morning.    gabapentin (NEURONTIN) 100 MG capsule Take 200 mg by mouth every evening.    HYDROcodone-acetaminophen (NORCO) 7.5-325 mg per tablet Take 1 tablet by mouth every 6 (six) hours as needed.    levothyroxine (SYNTHROID) 100 MCG tablet Take 100 mcg by mouth every morning.    megestroL (MEGACE) 40 MG Tab Take 40 mg by mouth every morning.    melatonin (MELATIN) 3 mg tablet Take 3 mg by mouth every evening. Take 2 tablets by mouth at bedtime    NOVOLIN 70/30 U-100 INSULIN 100 unit/mL (70-30) injection Inject 25 Units into the skin 2 (two) times a day.    pantoprazole (PROTONIX) 40 MG tablet Take 40 mg by mouth once daily.    VITAMIN D3 125 mcg (5,000 unit) Tab Take 1 tablet by mouth once daily.    warfarin (COUMADIN) 3 MG tablet 3 mg by Per G Tube route Daily. Take daily Tues through Sunday    warfarin (COUMADIN) 4 MG tablet Take 4 mg by mouth every Monday.    warfarin (COUMADIN) 5 MG tablet Take 5 mg by mouth Daily.      Family History         Problem Relation (Age of Onset)     Cancer Brother     Cirrhosis Mother     Colon cancer Brother     Diabetes Father     Hypertension Father                  Tobacco Use    Smoking status: Never    Smokeless tobacco: Never   Substance and Sexual Activity    Alcohol use: Never    Drug use: Never    Sexual activity: Not on file      Review of Systems   Constitutional:  Negative for fatigue.   Respiratory:  Positive  for apnea. Negative for shortness of breath and wheezing.    Cardiovascular:  Negative for chest pain.   Gastrointestinal:  Positive for abdominal distention.   Musculoskeletal:  Positive for arthralgias.     09/10 Patient asking to go home. Oxygen nasal cannula during the day. BIPAP hs. Patient with ROXANN and doesn't have CPAP at home yet. ESRD with dialysis MWF. Left AKA. Blood pressure waxes and wanes depending on patient's mood. /80. Midodrine tid. IV antibiotics discontinued  09/11 Patient stated on yesterday that she wants to go home. She even said she would sign AMA papers to go home. She agreed  to deferring until arrangements for CPAP at home. Met with patient and her daughter, Barbi on yesterday with Elaina Zamora, JAY and Renea Charlton RN Case manager. The conversation went bad with patient threatening to have me shot if she was not able to go home by Monday, 09/12. Her dialysis schedule is MWF. Continue to monitor BP along with BIPAP  hs.   09/12 - records reviewed.  Patient presented with scattered wounds worse involves her right hip.  Has left AKA.  Nonambulatory.  Lives at home with family caring for her in the past.  Patient is wanting to go home.  Daughter states cannot care for her and wants eventually long-term placement.  Patient being set up for home trilogy machine but she states she had had CPAP under direction Dr. Collins in the past.  On MWF dialysis; has had dialysis today.  Has completed IV antibiotic.  Apparently had refused surgery on right heel.  Will have Dr. Hanson review.  Previously been followed outpatient at [facility].   PMHx: HTN, blind right eye, A-Fib ( when seen), DM T2, GERD, CAD and non ambulatory which she states is from diabetic neuropathy.  Will discuss with .   09/13- no new issues.  Patient wanting to go home.  Family states they can not care for home and looking at long-term placement.  Nursing staff states she is having  frequent pain complaints with wounds.  Patient states she is willing to have her foot amputated but wants it done at [facility].  I have told her that leaving her right foot on will just get her sick down the road.     09/14- patient very much wanted to go home.  Patient's discussed with her family.  Family now agreeable to taking patient home.  There were caring for her previous.  Will get home health to help.  Patient to follow up [facility] wound care.  Patient states she is agreeable to have AKA on right leg but wished for it to be done by her wound Dr[facility].  Will have her follow up with primary care to follow her blood pressure blood sugar.  She will continue with dialysis as before.  Had dialysis today for discharge. discharged home.  She has O2 at 2 L by nasal cannula at home already.  Home ventilator is to be delivered today.           Goals of Care Treatment Preferences:  Code Status: Full Code      Consults:   Consults (From admission, onward)        Status Ordering Provider     Inpatient consult to General Surgery  Once        Provider:  Liban Hanson MD    Acknowledged TINY OWENS     Inpatient consult to Social Work  Once        Provider:  (Not yet assigned)    Completed THI WELLS     Inpatient consult to Nephrology  Once        Provider:  Jef Romero Jr., MD    Acknowledged THI WELLS     Inpatient consult to Nephrology  Once        Provider:  (Not yet assigned)    Acknowledged THI WELLS          Chronic respiratory failure  Patient with Hypercapnic Respiratory failure which is Acute on chronic.  she is on home oxygen at 2 LPM. Supplemental oxygen was provided and noted- Oxygen Concentration (%):  [30] 30.   Signs/symptoms of respiratory failure include- increased work of breathing. Contributing diagnoses includes - Obesity Hypoventilation Labs and images were reviewed. Patient Has not had a recent ABG. Will treat underlying causes and adjust  management of respiratory failure as follows- bronchospasm is cleared.  Continue with current medicines and to have home trilogy machine to wear when sleeping day or night.  She will continue with dialysis before to avoid any fluid overload.    Obstructive sleep apnea of adult    Home ventilator to be delivered today  Patient to wear when sleeping day or night      Final Active Diagnoses:    Diagnosis Date Noted POA    Obstructive sleep apnea of adult [G47.33] 09/14/2022 Yes    Chronic respiratory failure [J96.10] 09/14/2022 Yes    Morbid obesity with BMI of 50.0-59.9, adult [E66.01, Z68.43] 08/29/2022 Not Applicable    PVD (peripheral vascular disease) [I73.9] 08/27/2022 Yes    Diabetic ulcer of right heel associated with type 2 diabetes mellitus [E11.621, L97.419] 08/25/2022 Yes    Deep vein thrombosis (DVT) of lower extremity [I82.409] 08/25/2022 Yes    ESRD on dialysis [N18.6, Z99.2] 08/25/2022 Not Applicable    Pressure injury of buttock, unstageable [L89.300] 08/25/2022 Yes    Atrial fibrillation, controlled [I48.91] 08/25/2022 Yes    Infection of amputation stump [T87.40] 08/25/2022 Yes    Type 2 diabetes mellitus [E11.9] 08/25/2022 Yes    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    Why: Appt:  09/21/2022 @ 1:00  Contact information:  [phone]           Melissa Davis DO. Schedule an appointment as soon as possible for a visit in 1 week(s).    Specialty: Family Medicine  Contact  information:  [address]  [facility]  [place] [address]  [phone]                       Patient Instructions:      Diet diabetic     Change dressing (specify)   Order Comments: Dressing change:  Wound care instructions post discharge to be obtained from surgery and followed     Activity as tolerated       Significant Diagnostic Studies: Labs:   BMP:   Recent Labs   Lab 09/14/22  0828   *   *   K 4.2      CO2 26   BUN 33*   CREATININE 4.78*   CALCIUM 9.1   , CMP   Recent Labs   Lab 09/14/22  0828   *   K 4.2      CO2 26   *   BUN 33*   CREATININE 4.78*   CALCIUM 9.1   ANIONGAP 13    and CBC   Recent Labs   Lab 09/14/22  0828   WBC 13.59*   HGB 7.3*   HCT 23.7*        Intake/Output - Last 3 Shifts       09/12 0700 09/13 0659 09/13 0700 09/14 0659 09/14 0700  09/15 0659    P.O. 120 100 0    Other 500  0    Total Intake(mL/kg) 620 (4.8) 100 (0.8) 0 (0)    Other 2500  1500    Total Output 2500  1500    Net -1880 +100 -1500           Stool Occurrence  1 x             Microbiology Results (last 7 days)     ** No results found for the last 168 hours. **            Pending Diagnostic Studies:     None         Medications:  Reconciled Home Medications:      Medication List      START taking these medications    apixaban 2.5 mg Tab  Commonly known as: ELIQUIS  Take 1 tablet (2.5 mg total) by mouth 2 (two) times daily.     insulin detemir U-100 100 unit/mL injection  Commonly known as: Levemir  Inject 10 Units into the skin every evening.        CONTINUE taking these medications    allopurinoL 100 MG tablet  Commonly known as: ZYLOPRIM  Take 100 mg by mouth once daily.     aspirin 81 MG EC tablet  Commonly known as: ECOTRIN  Take 81 mg by mouth once daily.     atorvastatin 40 MG tablet  Commonly known as: LIPITOR  Take 40 mg by mouth every evening.     buPROPion 75 MG tablet  Commonly known as: WELLBUTRIN  Take 75 mg by mouth 2 (two) times daily.     EScitalopram  oxalate 10 MG tablet  Commonly known as: LEXAPRO  Take 10 mg by mouth every evening.     folic acid 1 MG tablet  Commonly known as: FOLVITE  Take 1,000 mcg by mouth every morning.     gabapentin 100 MG capsule  Commonly known as: NEURONTIN  Take 200 mg by mouth every evening.     HYDROcodone-acetaminophen 7.5-325 mg per tablet  Commonly known as: NORCO  Take 1 tablet by mouth every 6 (six) hours as needed.     levothyroxine 100 MCG tablet  Commonly known as: SYNTHROID  Take 100 mcg by mouth every morning.     melatonin 3 mg tablet  Commonly known as: MELATIN  Take 6 mg by mouth every evening.     pantoprazole 40 MG tablet  Commonly known as: PROTONIX  Take 40 mg by mouth once daily.     polyethylene glycol 17 gram/dose powder  Commonly known as: GLYCOLAX  Take 17 g by mouth once daily.     VITAMIN D3 125 mcg (5,000 unit) Tab  Generic drug: cholecalciferol (vitamin D3)  Take 1 tablet by mouth once daily.        STOP taking these medications    megestroL 40 MG Tab  Commonly known as: MEGACE     NovoLIN 70/30 U-100 Insulin 100 unit/mL (70-30) injection  Generic drug: insulin NPH-insulin regular (70/30)     warfarin 5 MG tablet  Commonly known as: COUMADIN            Indwelling Lines/Drains at time of discharge:   Lines/Drains/Airways     Central Venous Catheter Line  Duration                Hemodialysis Catheter left subclavian -- days                Time spent on the discharge of patient: more 30   minutes           Jorge Hung MD  Department of Hospital Medicine  [facility]

## 2022-09-14 NOTE — PROGRESS NOTES
Wound care note;  Patient skin was evaluated today  Patient in bed, alert  Teresa Dye FNP assessed all wounds today, new orders placed .  Started betadine to Right heel and Right clavicle wound . Dry black to tan wound bed.  Bilateral buttocks with denuded full thickness wound bed, cont Douderm wafer with alignate  Left upper buttock with purple discolored skin is denuded , red slow granular tissue noted, Applied alignate with duoderm wafer to area, Hard induration to outer edges.   Has purple discolored indurated areas to Left buttock noted .  Bedside debridement to Right buttock wound , cont santly ointment   Overall wounds cont to evolve.   Cont turn protocol  Pillow to Right heel  On Bariatric Custom soln low air loss mattress   Wound care team to follow  Poss d/c in am

## 2022-09-14 NOTE — ASSESSMENT & PLAN NOTE
Patient with Hypercapnic Respiratory failure which is Acute on chronic.  she is on home oxygen at 2 LPM. Supplemental oxygen was provided and noted- Oxygen Concentration (%):  [30] 30.   Signs/symptoms of respiratory failure include- increased work of breathing. Contributing diagnoses includes - Obesity Hypoventilation Labs and images were reviewed. Patient Has not had a recent ABG. Will treat underlying causes and adjust management of respiratory failure as follows- bronchospasm is cleared.  Continue with current medicines and to have home trilogy machine to wear when sleeping day or night.  She will continue with dialysis before to avoid any fluid overload.

## 2022-09-14 NOTE — PLAN OF CARE
[facility]  Discharge Final Note    Primary Care Provider: Primary Doctor No    Expected Discharge Date: 9/14/2022    Final Discharge Note (most recent)       Final Note - 09/14/22 1311          Final Note    Assessment Type Final Discharge Note     Anticipated Discharge Disposition Home-Health Care Svc        Post-Acute Status    Post-Acute Authorization Home Health     Home Health Status Set-up Complete/Auth obtained     Patient choice form signed by patient/caregiver List with quality metrics by geographic area provided;List from CMS Compare;List from System Post-Acute Care     Discharge Delays None known at this time                     Important Message from Medicare  Important Message from Medicare regarding Discharge Appeal Rights: Given to patient/caregiver     Date IMM was signed: 09/14/22  Time IMM was signed: 1311    Contact Info       [facility]    [phone]       Next Steps: Follow up in 1 week(s)    Instructions: Appt:  09/21/2022 @ 1:00    Melissa Davis DO   Specialty: Family Medicine    [address]  [facility] [address]   Phone: [phone]       Next Steps: Schedule an appointment as soon as possible for a visit in 1 week(s)          Pt wanting to discharge home. SS spoke with daughter and she wanted SS to check with Marc, the granddaughter. SS informed granddaughter that pt would require 24 hour care 7 days a week. They felt like they can provide that assistance and want to take her home. Pt current with [facility]. Vini to provide trilogy

## 2022-09-14 NOTE — NURSING
Pt being discharged via  [facility], awake, alert and oriented x3. She is crying and saying that the stretcher is hurting her hips. O2/3L/NC. All dressings are intact.

## 2022-09-14 NOTE — NURSING
At 1450, pt daughter, Barbi, calls to request pain medication for pt and states that pt has none at home. I notified Dr Hung via secure chat to request script. Dr Hung responds that he will bring script for Ultram. Pt daughter, Barbi, notified. Dr Hung then responds via secure chat that he will bring script for Norco for daughter, Barbi, to . I informed Dr Hung that I will notify Barbi when script is available for her to .

## 2022-09-14 NOTE — NURSING
At 1557, I called Barbi, pt daughter to inform her that script was available for pickup. No answer. At 1604, Liane returned phone call and I informed her that script for pain meds was available for . Barbi states she will come pick it up before the end of my shift. At, Barbi present and script for Norco given to her. Barbi voiced appreciation for staff caring for her mother.

## 2022-12-19 PROBLEM — J96.10 CHRONIC RESPIRATORY FAILURE: Status: RESOLVED | Noted: 2022-09-14 | Resolved: 2022-12-19

## 2022-12-19 PROBLEM — J96.02 ACUTE HYPERCAPNIC RESPIRATORY FAILURE: Status: RESOLVED | Noted: 2022-08-28 | Resolved: 2022-12-19
